# Patient Record
Sex: MALE | Race: WHITE | NOT HISPANIC OR LATINO | Employment: OTHER | URBAN - METROPOLITAN AREA
[De-identification: names, ages, dates, MRNs, and addresses within clinical notes are randomized per-mention and may not be internally consistent; named-entity substitution may affect disease eponyms.]

---

## 2017-05-10 ENCOUNTER — HOSPITAL ENCOUNTER (EMERGENCY)
Facility: HOSPITAL | Age: 72
Discharge: HOME | End: 2017-05-10
Attending: EMERGENCY MEDICINE
Payer: MEDICARE

## 2017-05-10 ENCOUNTER — NURSE TRIAGE (OUTPATIENT)
Dept: ADMINISTRATIVE | Facility: CLINIC | Age: 72
End: 2017-05-10

## 2017-05-10 ENCOUNTER — TELEPHONE (OUTPATIENT)
Dept: CARDIOLOGY | Facility: CLINIC | Age: 72
End: 2017-05-10

## 2017-05-10 VITALS
RESPIRATION RATE: 16 BRPM | HEART RATE: 51 BPM | DIASTOLIC BLOOD PRESSURE: 71 MMHG | WEIGHT: 232 LBS | SYSTOLIC BLOOD PRESSURE: 127 MMHG | BODY MASS INDEX: 37.45 KG/M2 | OXYGEN SATURATION: 88 % | TEMPERATURE: 98 F

## 2017-05-10 DIAGNOSIS — R07.89 ATYPICAL CHEST PAIN: Primary | ICD-10-CM

## 2017-05-10 LAB
ALBUMIN SERPL BCP-MCNC: 3.7 G/DL
ALP SERPL-CCNC: 79 U/L
ALT SERPL W/O P-5'-P-CCNC: 13 U/L
ANION GAP SERPL CALC-SCNC: 9 MMOL/L
AST SERPL-CCNC: 12 U/L
BASOPHILS # BLD AUTO: 0 K/UL
BASOPHILS NFR BLD: 0.5 %
BILIRUB SERPL-MCNC: 0.8 MG/DL
BNP SERPL-MCNC: 846 PG/ML
BUN SERPL-MCNC: 19 MG/DL
CALCIUM SERPL-MCNC: 9.6 MG/DL
CHLORIDE SERPL-SCNC: 108 MMOL/L
CO2 SERPL-SCNC: 23 MMOL/L
CREAT SERPL-MCNC: 0.9 MG/DL
DIFFERENTIAL METHOD: ABNORMAL
EOSINOPHIL # BLD AUTO: 0.2 K/UL
EOSINOPHIL NFR BLD: 2.8 %
ERYTHROCYTE [DISTWIDTH] IN BLOOD BY AUTOMATED COUNT: 17.2 %
EST. GFR  (AFRICAN AMERICAN): >60 ML/MIN/1.73 M^2
EST. GFR  (NON AFRICAN AMERICAN): >60 ML/MIN/1.73 M^2
GLUCOSE SERPL-MCNC: 133 MG/DL
HCT VFR BLD AUTO: 37.7 %
HGB BLD-MCNC: 12.3 G/DL
LYMPHOCYTES # BLD AUTO: 1.3 K/UL
LYMPHOCYTES NFR BLD: 17.4 %
MCH RBC QN AUTO: 30.1 PG
MCHC RBC AUTO-ENTMCNC: 32.6 %
MCV RBC AUTO: 92 FL
MONOCYTES # BLD AUTO: 0.6 K/UL
MONOCYTES NFR BLD: 8 %
NEUTROPHILS # BLD AUTO: 5.4 K/UL
NEUTROPHILS NFR BLD: 71.3 %
PLATELET # BLD AUTO: 221 K/UL
PMV BLD AUTO: 8.7 FL
POTASSIUM SERPL-SCNC: 4.4 MMOL/L
PROT SERPL-MCNC: 7.1 G/DL
RBC # BLD AUTO: 4.09 M/UL
SODIUM SERPL-SCNC: 140 MMOL/L
TROPONIN I SERPL DL<=0.01 NG/ML-MCNC: 0.02 NG/ML
WBC # BLD AUTO: 7.6 K/UL

## 2017-05-10 PROCEDURE — 96374 THER/PROPH/DIAG INJ IV PUSH: CPT

## 2017-05-10 PROCEDURE — 84484 ASSAY OF TROPONIN QUANT: CPT

## 2017-05-10 PROCEDURE — 80053 COMPREHEN METABOLIC PANEL: CPT

## 2017-05-10 PROCEDURE — 85025 COMPLETE CBC W/AUTO DIFF WBC: CPT

## 2017-05-10 PROCEDURE — 83880 ASSAY OF NATRIURETIC PEPTIDE: CPT

## 2017-05-10 PROCEDURE — 36415 COLL VENOUS BLD VENIPUNCTURE: CPT

## 2017-05-10 PROCEDURE — 99284 EMERGENCY DEPT VISIT MOD MDM: CPT | Mod: 25

## 2017-05-10 PROCEDURE — 93005 ELECTROCARDIOGRAM TRACING: CPT

## 2017-05-10 PROCEDURE — 93010 ELECTROCARDIOGRAM REPORT: CPT | Mod: ,,, | Performed by: INTERNAL MEDICINE

## 2017-05-10 PROCEDURE — 25000003 PHARM REV CODE 250: Performed by: EMERGENCY MEDICINE

## 2017-05-10 PROCEDURE — 63600175 PHARM REV CODE 636 W HCPCS: Performed by: EMERGENCY MEDICINE

## 2017-05-10 RX ORDER — ASPIRIN 325 MG
325 TABLET ORAL
Status: COMPLETED | OUTPATIENT
Start: 2017-05-10 | End: 2017-05-10

## 2017-05-10 RX ORDER — FUROSEMIDE 10 MG/ML
40 INJECTION INTRAMUSCULAR; INTRAVENOUS
Status: COMPLETED | OUTPATIENT
Start: 2017-05-10 | End: 2017-05-10

## 2017-05-10 RX ADMIN — FUROSEMIDE 40 MG: 10 INJECTION, SOLUTION INTRAMUSCULAR; INTRAVENOUS at 03:05

## 2017-05-10 RX ADMIN — LIDOCAINE HYDROCHLORIDE: 20 SOLUTION ORAL; TOPICAL at 01:05

## 2017-05-10 RX ADMIN — ASPIRIN 325 MG ORAL TABLET 325 MG: 325 PILL ORAL at 01:05

## 2017-05-10 NOTE — ED AVS SNAPSHOT
[facility] [address] [place] [address]               Moiz Valencia   5/10/2017  1:04 PM   ED    Description:  Male : 1945   Department:  [facility]           Your Care was Coordinated By:     Provider Role From To    Jose L Iglesias MD Attending Provider 05/10/17 3073 --      Reason for Visit     Chest Pain           Diagnoses this Visit        Comments    Atypical chest pain    -  Primary       ED Disposition     None           To Do List           Follow-up Information     Please follow up.    Why:  Follow-up with your cardiologist this week.        Follow up with [facility].    Specialty:  Emergency Medicine    Why:  If symptoms worsen    Contact information:    [address] [place] [id number] [phone]      [facility]     [facility] Care [facility] -  Assistance  Unless otherwise directed by your provider, please contact [facility], our nurse care line that is available for  assistance.     Registered nurses in the [facility] provide: appointment scheduling, clinical advisement, health education, and other advisory services.  Call: [phone] (toll free)               Medications           Message regarding Medications     Verify the changes and/or additions to your medication regime listed below are the same as discussed with your clinician today.  If any of these changes or additions are incorrect, please notify your healthcare provider.        These medications were administered today        Dose Freq    aspirin tablet 325 mg 325 mg ED 1 Time    Sig: Take 1 tablet (325 mg total) by mouth ED 1 Time.    Class: Normal    Route: Oral    (pyxis) gi cocktail (mylanta 30 mL, lidocaine 2 % viscous 10 mL, dicyclomine 10 mL) 50 mL  ED 1 Time    Sig: Take by mouth ED 1 Time.    Class: Normal    Route: Oral    furosemide injection 40 mg 40 mg ED 1 Time     "Sig: Inject 4 mLs (40 mg total) into the vein ED 1 Time.    Class: Normal    Route: Intravenous      STOP taking these medications     BD INSULIN PEN NEEDLE UF SHORT 31 gauge x 5/16" Ndle 1 Stick by Misc.(Non-Drug; Combo Route) route once daily.    blood sugar diagnostic Strp 1 strip by Misc.(Non-Drug; Combo Route) route as directed.           Verify that the below list of medications is an accurate representation of the medications you are currently taking.  If none reported, the list may be blank. If incorrect, please contact your healthcare provider. Carry this list with you in case of emergency.           Current Medications     allopurinol (ZYLOPRIM) 300 MG tablet Take 300 mg by mouth once daily.    amiodarone (PACERONE) 200 MG Tab Take 200 mg by mouth 2 (two) times daily.    aspirin (ECOTRIN) 325 MG EC tablet Take 325 mg by mouth once daily.    atorvastatin (LIPITOR) 80 MG tablet Take 40 mg by mouth once daily.    carvedilol (COREG) 25 MG tablet Take 1 tablet (25 mg total) by mouth 2 (two) times daily with meals.    ENTRESTO 24-26 mg per tablet TK 1 T PO  BID    furosemide (LASIX) 20 MG tablet 20 mg as needed.     insulin glargine (LANTUS SOLOSTAR) 100 unit/mL (3 mL) InPn pen Inject 38 Units into the skin 2 (two) times daily.    JANUVIA 100 mg Tab Take 1 tablet by mouth Daily.    magnesium oxide (MAG-OX) 400 mg tablet TK 1 T PO QD    metformin (GLUCOPHAGE) 1000 MG tablet Take 1,000 mg by mouth 2 (two) times daily with meals.    pantoprazole (PROTONIX) 40 MG tablet TK 1 T PO QAM    paroxetine (PAXIL) 30 MG tablet Take 30 mg by mouth. Pt take 2 tabs nightly.    tramadol (ULTRAM) 50 mg tablet TK 1 T PO  Q 4 TO 6 H PRN    furosemide injection 40 mg Inject 4 mLs (40 mg total) into the vein ED 1 Time.           Clinical Reference Information           Your Vitals Were     BP Pulse Temp Resp Weight SpO2    127/71 51 97.6 °F (36.4 °C) (Oral) 16 105.2 kg (232 lb) 88%    BMI                37.45 kg/m2          Allergies " as of 5/10/2017     No Known Allergies      Immunizations Administered on Date of Encounter - 5/10/2017     None      ED Micro, Lab, POCT     Start Ordered       Status Ordering Provider    05/10/17 1417 05/10/17 1416  Brain natriuretic peptide  Add-on      Final result     05/10/17 1316 05/10/17 1316  CBC auto differential  STAT      Final result     05/10/17 1316 05/10/17 1316  Comprehensive metabolic panel  STAT      Final result     05/10/17 1316 05/10/17 1316  Troponin I #1  Once      Final result       ED Imaging Orders     Start Ordered       Status Ordering Provider    05/10/17 1317 05/10/17 1316  X-Ray Chest PA And Lateral  1 time imaging      Final result       Discharge References/Attachments     CHEST WALL PAIN, COSTOCHONDRITIS (ENGLISH)    PULMONARY EDEMA (ENGLISH)      Your Scheduled Appointments     Jun 21, 2017  9:00 AM CDT   Established Patient Visit with Mio Galeana MD   [facility] ([facility])    [address]  [facility] [address]   [phone]              [facility] Sign-Up     Activating your [facility] account is as easy as 1-2-3!     1) Visit [email or web address], select Sign Up Now, enter this activation code and your date of birth, then select Next.  [email or web address]  Expires: 6/23/2017  2:53 PM      2) Create a username and password to use when you visit [facility] in the future and select a security question in case you lose your password and select Next.    3) Enter your e-mail address and click Sign Up!    Additional Information  If you have questions, please e-mail [email or web address] or call [phone] to talk to our [facility] staff. Remember, [facility] is NOT to be used for urgent needs. For medical emergencies, dial 911.         Smoking Cessation     If you would like to quit smoking:   You may be eligible for free services if you are a [place] resident and started smoking cigarettes before September 1, 1988.  Call the [employer]oking [employer]essation [employer] ([employer])  toll free at (374) 196-9969 or (166) 325-0286.   Call 1-800-QUIT-NOW if you do not meet the above criteria.   Contact us via email: [email or web address]   View our website for more information: [email or web address]         [facility] complies with applicable Federal civil rights laws and does not discriminate on the basis of race, color, national origin, age, disability, or sex.        Language Assistance Services     ATTENTION: Language assistance services are available, free of charge. Please call [phone].      ATENCIÓN: Si habla español, tiene a reed disposición servicios gratuitos de asistencia lingüística. Llame al [phone].     CHÚ Ý: N?u b?n nói Ti?ng Vi?t, có các d?ch v? h? tr? ngôn ng? mi?n phí dành cho b?n. G?i s? [phone].

## 2017-05-10 NOTE — ED PROVIDER NOTES
"Encounter Date: 5/10/2017    SCRIBE #1 NOTE: I, Sabina Nguyen, am scribing for, and in the presence of, Dr. Iglesias.       History     Chief Complaint   Patient presents with    Chest Pain     "burning" left sided     Review of patient's allergies indicates:  No Known Allergies  HPI Comments:   05/10/2017 1:06 PM     Chief Complaint: Chest & abdominal pain      The patient is a 71 y.o. male with DM2, HTN, MI, CAD, and PUD who presents to the ED concerned for his defibrillator with an onset of intermittent episodes of left sided chest and abdominal pain for the last week. The pain is described as burning. He states that the pain "wakes him up at night. The patient also states that he is unsure of similar pain, because he "didn't feel any pain during his heart attack." He reports having an abdominal hernia. The patient has not had his defibrillator check "since the [facility] closed ~10 years ago but denies defibrillator shocks. His Cardiologist is Dr. Digna Mortensen. He denies prior similar symptoms, belching, SOB, nausea, flank pain, or any other symptoms at this time. No pertinent SHx noted.    The history is provided by the patient and the spouse (wife).     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   HENT: Negative for nosebleeds.  "   Eyes: Negative for visual disturbance.   Respiratory: Negative for shortness of breath.    Cardiovascular: Positive for chest pain.   Gastrointestinal: Positive for abdominal pain. Negative for nausea.   Genitourinary: Negative for flank pain.   Musculoskeletal: Negative for back pain and neck pain.   Skin: Negative for rash.   Neurological: Negative for seizures, syncope and headaches.     Physical Exam   Initial Vitals   BP Pulse Resp Temp SpO2   05/10/17 1301 05/10/17 1301 05/10/17 1301 05/10/17 1301 05/10/17 1301   89/62 60 16 97.6 °F (36.4 °C) 96 %     Physical Exam    Nursing note and vitals reviewed.  Constitutional: He appears well-developed and well-nourished. No distress.   HENT:   Head: Normocephalic and atraumatic.   Eyes: Conjunctivae and EOM are normal. Pupils are equal, round, and reactive to light.   Neck: Neck supple.   Cardiovascular: Normal rate and normal heart sounds. An irregular rhythm present. Exam reveals no gallop and no friction rub.    No murmur heard.  S1 and S2 normal.    Pulmonary/Chest: Breath sounds normal. No respiratory distress. He has no wheezes. He has no rhonchi. He has no rales.   Palpable defibrillator in the left anterior chest wall.    Abdominal: Soft. Bowel sounds are normal. He exhibits no distension. There is no tenderness.   Musculoskeletal: Normal range of motion. He exhibits no edema or tenderness.   No peripheral edema.    Neurological: He is alert and oriented to person, place, and time. He has normal strength. No cranial nerve deficit or sensory deficit.   Neurologically intact.    Skin: Skin is warm and dry.   Psychiatric: He has a normal mood and affect.       ED Course   Procedures  Labs Reviewed   CBC W/ AUTO DIFFERENTIAL   COMPREHENSIVE METABOLIC PANEL   TROPONIN I     Imaging Results     None        EKG Readings: (Independently Interpreted)   Other EKG Interpretations: EKG shows a rate of 67, left axis deviation, normal sinus rhythm with premature atrial  contraction.        Medical Decision Making:   History:   Old Medical Records: I decided to obtain old medical records.  Clinical Tests:   Lab Tests: Reviewed and Ordered  Radiological Study: Reviewed and Ordered  Medical Tests: Reviewed and Ordered  Pt has a hx of CAD, DM, HTN, HLP, Cardimyopathy PUD, PNA who presents to emergency room with atypical chest pain.  It is reproducible in nature over the left anterior chest wall.  Patient states this does not feel like his prior cardiac chest pain.  Symptoms have been persistent recently.  Patient would like to go home and follow-up with his cardiologist.  He did have some mild pulmonary edema on his examining him a shot of Lasix.  This edema is unchanged from prior.  Troponin is negative and EKG shows no signs of acute ischemia.             Scribe Attestation:   Scribe #1: I performed the above scribed service and the documentation accurately describes the services I performed. I attest to the accuracy of the note.    Attending Attestation:           Physician Attestation for Scribe:  Physician Attestation Statement for Scribe #1: I, Dr. Iglesias, reviewed documentation, as scribed by Sabina Nguyen in my presence, and it is both accurate and complete.                 ED Course     Clinical Impression:     1. Atypical chest pain                Jose L Iglesias MD  05/10/17 0924

## 2017-05-10 NOTE — TELEPHONE ENCOUNTER
----- Message from Lilia Young sent at 5/10/2017 10:55 AM CDT -----  Contact: Esther Valencia  Wife called regarding scheduling the patient an appt for today for chest pains. Previous patient with [facility]. No answer from the pod, sent the patient's wife to On call Nurse for further assistance. Please contact [phone]

## 2017-05-10 NOTE — TELEPHONE ENCOUNTER
Reason for Disposition   Chest pain lasting longer than 5 minutes    Protocols used: ST CHEST PAIN-A-OH  Spoke to patient's wife who states he is experiencing chest pain around area of defibrillator. Caller pain is constant and patient told her he never felt pain this before.

## 2017-05-11 DIAGNOSIS — Z95.810 ICD (IMPLANTABLE CARDIOVERTER-DEFIBRILLATOR) IN PLACE: Primary | ICD-10-CM

## 2017-05-11 DIAGNOSIS — I49.9 CARDIAC ARRHYTHMIA, UNSPECIFIED CARDIAC ARRHYTHMIA TYPE: ICD-10-CM

## 2017-05-11 PROBLEM — I50.1 LEFT HEART FAILURE: Status: ACTIVE | Noted: 2017-05-11

## 2017-05-11 PROBLEM — Z79.899 LONG TERM CURRENT USE OF AMIODARONE: Status: ACTIVE | Noted: 2017-05-11

## 2017-05-12 ENCOUNTER — TELEPHONE (OUTPATIENT)
Dept: CARDIOLOGY | Facility: CLINIC | Age: 72
End: 2017-05-12

## 2017-05-12 ENCOUNTER — OFFICE VISIT (OUTPATIENT)
Dept: CARDIOLOGY | Facility: CLINIC | Age: 72
End: 2017-05-12
Payer: MEDICARE

## 2017-05-12 ENCOUNTER — CLINICAL SUPPORT (OUTPATIENT)
Dept: CARDIOLOGY | Facility: CLINIC | Age: 72
End: 2017-05-12
Payer: MEDICARE

## 2017-05-12 VITALS
HEART RATE: 49 BPM | WEIGHT: 233 LBS | DIASTOLIC BLOOD PRESSURE: 62 MMHG | HEIGHT: 65 IN | SYSTOLIC BLOOD PRESSURE: 120 MMHG | BODY MASS INDEX: 38.82 KG/M2

## 2017-05-12 DIAGNOSIS — I50.1 LEFT HEART FAILURE: ICD-10-CM

## 2017-05-12 DIAGNOSIS — E78.00 HYPERCHOLESTEROLEMIA: ICD-10-CM

## 2017-05-12 DIAGNOSIS — Z79.899 LONG TERM CURRENT USE OF AMIODARONE: ICD-10-CM

## 2017-05-12 DIAGNOSIS — Z01.810 ENCOUNTER FOR PRE-OPERATIVE CARDIOVASCULAR CLEARANCE: Primary | ICD-10-CM

## 2017-05-12 DIAGNOSIS — E66.01 SEVERE OBESITY (BMI 35.0-39.9) WITH COMORBIDITY: ICD-10-CM

## 2017-05-12 DIAGNOSIS — I49.9 CARDIAC ARRHYTHMIA, UNSPECIFIED CARDIAC ARRHYTHMIA TYPE: ICD-10-CM

## 2017-05-12 DIAGNOSIS — Z95.810 ICD (IMPLANTABLE CARDIOVERTER-DEFIBRILLATOR) IN PLACE: ICD-10-CM

## 2017-05-12 DIAGNOSIS — I25.10 CORONARY ARTERY DISEASE INVOLVING NATIVE CORONARY ARTERY OF NATIVE HEART WITHOUT ANGINA PECTORIS: ICD-10-CM

## 2017-05-12 PROCEDURE — 93282 PRGRMG EVAL IMPLANTABLE DFB: CPT | Mod: PBBFAC,PO | Performed by: INTERNAL MEDICINE

## 2017-05-12 PROCEDURE — 99999 PR PBB SHADOW E&M-EST. PATIENT-LVL III: CPT | Mod: PBBFAC,,, | Performed by: INTERNAL MEDICINE

## 2017-05-12 PROCEDURE — 99214 OFFICE O/P EST MOD 30 MIN: CPT | Mod: S$PBB,,, | Performed by: INTERNAL MEDICINE

## 2017-05-12 RX ORDER — GABAPENTIN 100 MG/1
200 CAPSULE ORAL 3 TIMES DAILY
COMMUNITY
End: 2019-06-10

## 2017-05-12 RX ORDER — CARVEDILOL 25 MG/1
25 TABLET ORAL 2 TIMES DAILY WITH MEALS
Qty: 180 TABLET | Refills: 3 | Status: SHIPPED | OUTPATIENT
Start: 2017-05-12 | End: 2019-03-11 | Stop reason: SDUPTHER

## 2017-05-12 RX ORDER — ETODOLAC 400 MG/1
400 TABLET, FILM COATED ORAL 2 TIMES DAILY
COMMUNITY
End: 2019-01-24

## 2017-05-12 NOTE — PATIENT INSTRUCTIONS
Understanding Cardiac Resynchronization Therapy (CRT)    Cardiac resynchronization therapy (CRT) is a treatment that may help you when your heart isnt pumping as well as it should. This problem can be caused by heart failure, a condition in which the heart muscle has become weak. It can also be caused by an electrical problem that keeps the bottom chambers of the heart (ventricles) from beating in sync. This can make heart failure worse.  When you have heart failure, fluid can build up in your lungs and your legs (edema). You may have less energy and be short of breath. These symptoms interfere with daily life.  CRT uses a small device to help improve the timing of the hearts contractions. CRT can ease symptoms, improve your quality of life, and help you live longer.  How CRT Works  With CRT, a small electrical device (pacemaker or defibrillator) is put under the skin in the upper chest. This is a minor surgical procedure done at a hospital. It is not heart surgery. Wires from the device lead to the ventricles. The device sends electrical pulses to each ventricle at the same time. This keeps the ventricles beating in sync . This procedure is also called biventricular pacing or resynchronization pacing.  CRT can be done with 1 of 2 devices. The type of device used depends on the persons needs. The devices are:  · A biventricular pacemaker. This device helps the heart beat at a normal rate.  · A biventricular ICD (implantable cardioverter defibrillator). This device is a pacemaker but also can treat fast, life-threatening heart rhythms.  Reasons for CRT  Your healthcare provider may advise CRT if:  · You have heart failure symptoms and your heart doesnt pump well  · The ventricles are not working together  · Tests, like an echocardiogram, show that your heart is weak and enlarged  · Medicine and lifestyle changes are not working well enough to control your heart failure  Benefits of CRT  CRT wont replace your  other treatments. Its part of a complete heart failure treatment plan. CRT helps a weakened heart do a better job of pumping blood out of the heart with each beat. So, more blood and oxygen go to the rest of your body. This can decrease heart failure symptoms and improve survival. The device is put into your body during a low-risk procedure.  Not everyone with heart failure benefits from CRT. CRT improves symptoms in about 2 out of 3 people who get it. For those who have mild symptoms, it can also prevent worsening heart failure. With CRT, you may be more able to:  · Return to daily activities such as walking, carrying grocery bags, and climbing stairs  · Have more energy to be active and do the things you enjoy  · Breathe more easily when you lie flat, so you sleep better at night  · Have less swelling in your ankles, feet, and abdomen  · Make fewer visits to the hospital because of heart failure symptoms  · Have fewer side effects from your heart failure medicines  Risks and complications  Like all medical procedures, having a CRT device implanted has some risks. These include:  · Anesthesia reactions  · Swelling or bruising in the upper chest area where the CRT device is placed  · Bleeding  · Infection  · Heart rhythm problems  · Collapsed lung  · Nerve or blood vessel damage  · Movement of the device or the device wires, which may require a second procedure  · Mechanical problems with the CRT device  · Kidney damage  · Sudden worsening of heart failure  · Other risks related to your specific medical condition  Life with CRT  If you have a CRT device, you may need to stay away from certain electronic devices and devices that have strong magnetic fields. These devices can interfere with how the CRT device works. You will need to avoid anti-theft systems, security metal detectors, CB radios, and very strong magnets, such as those used in MRI. However, depending on the type of device you have implanted, you may be  able to undergo an MRI with certain precautions in place. Talk to your cardiologist and the radiologist to make sure it is safe.  Your provider may also give you specific instructions for using cell phones and headphones with mp3 players. Your provider may give you a list of other devices and procedures to avoid. Most people with CRT devices can still enjoy physical activity, including sports and exercise.  You should not drive until your doctor says it's OK. The driving restriction is done to be sure that your health condition does not cause a safety issue for yourself or others on the road. Ask your provider when it may be safe for you to continue to drive.  You will have regular appointments to see how the device is working and to check the battery life of your device. Some of the device monitoring can be done using a home device that transmits the information about your device to your provider's office over a telephone or internet connection. The battery, or generator, will have to be changed at some point and your provider will let you know as that time approaches.  Date Last Reviewed: 6/1/2016 © 2000-2016 [employer]. [address] [place] [address]. All rights reserved. This information is not intended as a substitute for professional medical care. Always follow your healthcare professional's instructions.        Left-Sided Congestive Heart Failure    The heart is a large muscle that pumps blood throughout the body. Blood carries oxygen to all the organs (including the brain), muscles, and skin of your body. After the body takes the oxygen out of the blood, the blood returns to the heart. The right side of the heart collects that blood and pumps it to the lungs to receive fresh oxygen. This oxygen-rich blood from the lungs then returns to the left side of the heart, where it is pumped back out to the brain and rest of the body, starting the process all over.   Heart failure occurs  when the heart muscle is weakened. This can occur after a heart attack or with significant coronary artery disease. This affects the pumping action of the heart.   When the left side of the heart is weakened, it cant handle the blood it is getting from the lungs. Pressure then builds up in the veins of the lungs, causing fluid to leak into the lung tissues. This may be referred to as congestive heart failure. This causes you to feel short of breath, weak, or dizzy. These symptoms are often worse with exertion, such as climbing stairs or walking up hills. Lying flat is uncomfortable and can make your breathing worse. This may make sleeping difficult and force you to use extra pillows to elevate your upper body to help you sleep well.  Causes of heart failure include:  · Coronary artery disease  · Heart attack in the past (also known as acute myocardial infarction, or AMI)  · High blood pressure  · Damaged heart valve  · Diabetes  · Obesity  · Cigarette smoking  · Alcohol abuse  Treatment  Heart failure is a chronic condition. There is no cure. The purpose of medical treatment is to improve the pumping action of the heart, and remove excess water and fluids from the body. A number of medicines can help reach this goal, improve symptoms and keep the heart from becoming weaker. In some cases of severe heart failure, a mechanical device will be placed in the heart to help the heart pump. Another major goal is to better treat the causes of heart failure, such as diabetes, high blood pressure, and your lifestyle.  Home care  · Check your weight every day. A sudden increase in weight gain could mean worsening heart failure.  ¨ Use the same scale every day.  ¨ Weigh yourself at the same time every day.  ¨ Make sure the scale is on the floor, not on a rug.  ¨ Keep a record of your weight every day, so your healthcare provider can see it. If you are not given a log sheet for this, keep a separate journal for this  purpose.   · Cut back on how much salt (sodium) you eat:  ¨ Your provider will tell you how much salt to have daily, usually 2,000 mg or less.  ¨ Avoid high-salt foods. These include olives, pickles, smoked meats, processed foods, and salted potato chips.  ¨ Don't add salt to your food at the table. Use only small amounts of salt when cooking.  ¨ Avoid binging on salt-heavy meals.  · Follow your healthcare provider's recommendations about how much fluid you should have.  · Stop smoking.  · Cut back on the amount of alcohol you drink.  · Lose weight if you are overweight. The excess weight adds a lot of stress on the workload of the heart.  · Stay active. Talk to your provider about an exercise program that is safe for your heart.  · Keep your feet elevated to reduce swelling. Ask your provider about support hose as a preventive treatment for daytime leg swelling.  · Follow your healthcare provider's instructions closely.  Besides taking your medicine as instructed, an important part of treatment includes lifestyle changes. These include diet, physical activity, stopping smoking, and weight control.  Improve your diet. Often in the hospital, people are given a heart healthy diet. This includes more fresh foods, lower saturated fat, less processed foods, and lower salt.  Follow-up care  Follow up with your healthcare provider, or as advised. Make sure to keep any appointments that were made for you. This can help better control heart failure.  If an X-ray was done, you will be told of any new findings that may affect your care.  Call 911  Call 911 if you:  · Become severely short of breath  · Feel lightheaded, or feel like you might pass out or faint  · Have chest pain or discomfort that is different than usual, the medicines your provider told you to use for this don't help, or the pain lasts longer than 10 to 15 minutes  · Develop a rapid heart rate suddenly  When to seek medical advice  Call your healthcare  provider right away if you have any of these signs of worsening heart failure:  · Sudden weight gain --  more than 2 pounds in 1 day, or 5 pounds in 1 week, or whatever weight gain you were told to report by your provider  · Trouble breathing not related to being active  · New or increased swelling of your legs or ankles  · Swelling or pain in your abdomen  · Breathing trouble at night, waking up short of breath or needing more pillows to elevate your upper body to help you breathe  · Frequent coughing that doesnt go away  · Feeling much more tired than usual  Date Last Reviewed: 1/4/2016 © 2000-2016 [employer]. [address] [place] [address], [place] [address]. All rights reserved. This information is not intended as a substitute for professional medical care. Always follow your healthcare professional's instructions.        Understanding Coronary Artery Disease (CAD)    To understand coronary artery disease (CAD), you need to know how your heart works. Your heart is a muscle that pumps blood throughout your body. To work right, your heart needs a steady supply of oxygen. It gets this oxygen from blood supplied by the coronary arteries.        Healthy Artery      Damaged Artery      Narrowed Artery      Blocked Artery   Healthy artery. When a coronary artery is healthy and has no blockages, blood flows through easily. Healthy arteries can easily supply the oxygen-rich blood your heart needs.  Damaged artery. Coronary artery disease begins when damage to the artery lining leads to the buildup of fat-like substances and cholesterol along the artery wall. This is called plaque. This damage could be caused by things like high blood pressure or smoking. This plaque buildup begins to narrow the arteries carrying blood to the heart. This is called atherosclerosis,  Narrowed artery. As more plaque builds up, your artery has trouble supplying blood to your heart muscle when it needs it most, such as during  "exercise. You may not feel any symptoms when this happens. Or you may feel angina--pressure, tightness, achiness, or pain in your chest, jaw, neck, back, or arm.  Blocked artery. A piece of plaque may break off and completely block the artery. Or a blood clot may plug the narrowed artery. When this happens, blood flow is blocked from reaching the heart. Without oxygen-rich blood, part of the heart muscle becomes damaged and stops working. You may feel crushing pressure or pain in or around your chest. This is a heart attack (acute myocardial infarction, or AMI) and is a medical emergency.  Date Last Reviewed: 3/28/2016  © 8699-2170 [employer]. [address] [place] [address], [place] [address]. All rights reserved. This information is not intended as a substitute for professional medical care. Always follow your healthcare professional's instructions.        Risk Factors for Heart Disease  A risk factor is something that increases your chance of having heart disease. Heart disease (also called coronary artery disease) involves damage to the heart arteries. These blood vessels need to work well to provide the oxygen your heart needs to pump blood to the rest of your body. Things like smoking or high cholesterol levels can damage arteries. You cant control some risk factors, such as age and a family history of heart disease. But there are many things you can control to reduce your risk for heart disease.    Unhealthy cholesterol levels  Cholesterol is a fat-like substance in your blood. It can build up along the artery walls. This is called plaque. Over time, plaque narrows the arteries and reduces blood flow to your heart or brain. If a blood clot forms or a piece of the plaque breaks off, it can completely block the artery and cause a heart attack or stroke. Your risk of heart disease goes up if you have high levels of LDL ("bad") cholesterol or triglycerides (another fatty substance that can build up). " "Youre also at risk if you have low HDL cholesterol ("good") cholesterol. HDL helps clear the bad cholesterol away. You're at risk if you have:  HDL cholesterol 50 mg/dL or lower; LDL cholesterol 100 mg/dL; or triglycerides of 150 mg/dL or higher.  Smoking  This is the most important risk factor you can change. Smoking causes inflammation and damages the smooth muscle that lines the arteries making them less flexible. It also raises your blood pressure, causing further damage to the artery lining. Smoking also increases your risk that your blood may clot, block an artery, and cause a heart attack or stroke. Smoking also damages your lungs, which affects the delivery of oxygen to the body. If you smoke, you are 2 to 4 times more likely to develop coronary artery disease. If you smoke, it's never too late to help your heart. Ask your doctor about nicotine replacement products and smoking cessation support.  High blood pressure  High blood pressure occurs when blood pushes too hard against artery walls. This causes damage to the artery walls and the formation of scar tissue as it heals. This makes the arteries stiff and weak. Plaque sticks to the scarred tissue narrowing and hardening the arteries. High blood pressure also causes your heart to work harder to get blood out to the body. High blood pressure raises your risk of heart attack, also known as acute myocardial infarction, or AMI, and especially stroke. The brain tissue is especially sensitive to high blood pressure damage. You're at risk if your blood pressure is 120/80 or higher.  Negative emotions  Chronic stress, pent-up anger, and other negative emotions have been linked to heart disease. This occurs because stress increases the levels of a hormone that increase the demand on your heart. Over time, these emotions could raise your heart disease risk.  Metabolic syndrome  This is caused by a combination of certain risk factors. It puts you at extra high risk " of heart disease, stroke, and diabetes. You have metabolic syndrome if you have 3 or more of the following: low HDL cholesterol; high triglycerides; high blood pressure; high blood sugar; extra weight around the waist.  Diabetes  Diabetes occurs when you have high levels of sugar (glucose) in your blood. This can damage arteries if not kept under control. Having diabetes also makes you more likely to have a silent heart attack--one without any symptoms.  Excess weight  Excess weight makes other risk factors, such as diabetes, more likely. Excess weight around the waist or stomach increases your heart disease risk the most.  Lack of physical activity  When youre not active, youre more likely to develop diabetes, high blood pressure, high cholesterol levels, and excess weight.     Most people with heart disease have more than one risk factor.   Date Last Reviewed: 3/28/2016  © 2500-2683 [employer]. [address] [place] [address]. All rights reserved. This information is not intended as a substitute for professional medical care. Always follow your healthcare professional's instructions.        Weight Management: Exercise and Activity  Studies show that people who exercise are the most likely to lose weight and keep it off. Exercise burns calories. It helps build muscle to make your body stronger. Make exercise an important part of your weight-management plan.    Make activity part of your day  You may not think you have the time to exercise. But you can work activity into your daily life--you just need to be committed. Take 10 minutes out of your lunch hour to take a walk. Walk to the newsstand to get your paper instead of having it delivered. Make it a habit to take the stairs instead of the elevator. Park in a far away parking spot instead of the closest. Youll be surprised at how fast these little changes can make a difference.  Some people really cannot walk very far, and tire out  quickly with exercise. Instead of becoming discouraged, resolve to do what you can do, and work to make that a regular frequent habit.   The benefits of exercise  Exercise offers many benefits including:   · Exercise increases your metabolism (the speed at which your body burns calories).  · Regular exercise can increase the amount of muscle in your body. Muscle burns calories faster than fat. The more muscle you have, the more calories you burn.  · Exercise gives you energy and curbs your appetite.  · Exercise decreases stress and helps you sleep better.  Make exercise fun  Exercise can be fun. Choose an activity you enjoy. You may even get a friend to do it with you:  · Take a resistance-training or aerobics class  · Join a team sport  · Take a dance class  · Walk the dog  · Ride a bike  If you have health problems, be sure to ask your healthcare provider before you start an exercise program. Have a  help you develop a plan thats safe for you.   Date Last Reviewed: 2/4/2016  © 8871-7624 [employer] [facility] [facility] [facility] ([employer]). [address] [place] [address], [place] [address]. All rights reserved. This information is not intended as a substitute for professional medical care. Always follow your healthcare professional's instructions.

## 2017-05-12 NOTE — TELEPHONE ENCOUNTER
----- Message from Carmelita Jace sent at 5/10/2017  4:47 PM CDT -----  Contact: Wife - Esther Valencia  States that the patient is now at [facility] in the ER and is a previous patient of Dr Sarkar's.  The patient is going home but the doctor at the hospital is requesting for the patient to see Dr Sarkar tomorrow.  The next available appointment is not until 05/30/2017 for a consult.  Can you pllease call [phone].  Thank you

## 2017-05-12 NOTE — MR AVS SNAPSHOT
[facility]  [address] [facility] [address]  [facility] [address]  Phone: [phone]                  Moiz Valencia   2017 9:20 AM   Office Visit    Description:  Male : 1945   Provider:  Digna Sarkar MD   Department:  [facility]           Reason for Visit     Congestive Heart Failure           Diagnoses this Visit        Comments    Encounter for pre-operative cardiovascular clearance    -  Primary MOD RISK    Left heart failure     CLASS 2, SEVERE LV DYSFUNCTION    Long term current use of amiodarone     LABS    ICD (implantable cardioverter-defibrillator) in place     CHECK    Coronary artery disease involving native coronary artery of native heart without angina pectoris     STABLE    Hypercholesterolemia         Severe obesity (BMI 35.0-39.9) with comorbidity                To Do List           Goals (5 Years of Data)     None      Follow-Up and Disposition     Return in about 3 months (around 2017).       These Medications        Disp Refills Start End    carvedilol (COREG) 25 MG tablet 180 tablet 3 2017     Take 1 tablet (25 mg total) by mouth 2 (two) times daily with meals. - Oral    Pharmacy: [facility] [id number] [place] [address] AT [facility] Ph #: [phone]         [facility]     [facility] Care [facility] -  Assistance  Unless otherwise directed by your provider, please contact [facility], our nurse care line that is available for  assistance.     Registered nurses in the [facility] provide: appointment scheduling, clinical advisement, health education, and other advisory services.  Call: [phone] (toll free)               Medications           Message regarding Medications     Verify the changes and/or additions to your medication regime listed below are the same as discussed with your clinician today.  If any of these changes or additions are incorrect, please  "notify your healthcare provider.        STOP taking these medications     pantoprazole (PROTONIX) 40 MG tablet Take one tablet every morning.           Verify that the below list of medications is an accurate representation of the medications you are currently taking.  If none reported, the list may be blank. If incorrect, please contact your healthcare provider. Carry this list with you in case of emergency.           Current Medications     allopurinol (ZYLOPRIM) 300 MG tablet Take 300 mg by mouth once daily.    amiodarone (PACERONE) 200 MG Tab Take 200 mg by mouth 2 (two) times daily.    aspirin (ECOTRIN) 325 MG EC tablet Take 325 mg by mouth once daily.    atorvastatin (LIPITOR) 80 MG tablet Take 40 mg by mouth once daily.    carvedilol (COREG) 25 MG tablet Take 1 tablet (25 mg total) by mouth 2 (two) times daily with meals.    ENTRESTO 24-26 mg per tablet Take one tablet by mouth twice daily.    etodolac (LODINE) 400 MG tablet Take 400 mg by mouth 2 (two) times daily.    furosemide (LASIX) 20 MG tablet 20 mg as needed (fluid).     gabapentin (NEURONTIN) 100 MG capsule Take 200 mg by mouth 3 (three) times daily. Takes 200mg    insulin detemir (LEVEMIR) 100 unit/mL injection Inject 38 Units into the skin 2 (two) times daily.    insulin glargine (LANTUS SOLOSTAR) 100 unit/mL (3 mL) InPn pen Inject 38 Units into the skin 2 (two) times daily.    JANUVIA 100 mg Tab Take 1 tablet by mouth Daily.    magnesium oxide (MAG-OX) 400 mg tablet Take one tablet daily.    metformin (GLUCOPHAGE) 1000 MG tablet Take 1,000 mg by mouth 2 (two) times daily with meals.    paroxetine (PAXIL) 30 MG tablet Take 30 mg by mouth 2 (two) times daily.     tramadol (ULTRAM) 50 mg tablet Take one tablet by mouth every 4-6 hours as needed for pain.           Clinical Reference Information           Your Vitals Were     BP Pulse Height Weight BMI    120/62 49 5' 5" (1.651 m) 105.7 kg (233 lb 0.4 oz) 38.78 kg/m2      Blood Pressure          Most " Recent Value    BP  120/62      Allergies as of 5/12/2017     No Known Allergies      Immunizations Administered on Date of Encounter - 5/12/2017     None      Orders Placed During Today's Visit     Future Labs/Procedures Expected by Expires    Comprehensive metabolic panel  5/12/2017 7/11/2018    Lipid panel  5/12/2017 7/11/2018    T3  5/12/2017 7/11/2018    T4  5/12/2017 7/11/2018    TSH  5/12/2017 7/11/2018      [facility] Sign-Up     Activating your [facility] account is as easy as 1-2-3!     1) Visit [email or web address], select Sign Up Now, enter this activation code and your date of birth, then select Next.  [email or web address]  Expires: 6/23/2017  2:53 PM      2) Create a username and password to use when you visit [facility] in the future and select a security question in case you lose your password and select Next.    3) Enter your e-mail address and click Sign Up!    Additional Information  If you have questions, please e-mail [email or web address] or call [phone] to talk to our [facility] staff. Remember, [facility] is NOT to be used for urgent needs. For medical emergencies, dial 911.         Instructions      Understanding Cardiac Resynchronization Therapy (CRT)    Cardiac resynchronization therapy (CRT) is a treatment that may help you when your heart isnt pumping as well as it should. This problem can be caused by heart failure, a condition in which the heart muscle has become weak. It can also be caused by an electrical problem that keeps the bottom chambers of the heart (ventricles) from beating in sync. This can make heart failure worse.  When you have heart failure, fluid can build up in your lungs and your legs (edema). You may have less energy and be short of breath. These symptoms interfere with daily life.  CRT uses a small device to help improve the timing of the hearts contractions. CRT can ease symptoms, improve your quality of life, and help you live longer.  How CRT Works  With CRT, a small  electrical device (pacemaker or defibrillator) is put under the skin in the upper chest. This is a minor surgical procedure done at a hospital. It is not heart surgery. Wires from the device lead to the ventricles. The device sends electrical pulses to each ventricle at the same time. This keeps the ventricles beating in sync . This procedure is also called biventricular pacing or resynchronization pacing.  CRT can be done with 1 of 2 devices. The type of device used depends on the persons needs. The devices are:  · A biventricular pacemaker. This device helps the heart beat at a normal rate.  · A biventricular ICD (implantable cardioverter defibrillator). This device is a pacemaker but also can treat fast, life-threatening heart rhythms.  Reasons for CRT  Your healthcare provider may advise CRT if:  · You have heart failure symptoms and your heart doesnt pump well  · The ventricles are not working together  · Tests, like an echocardiogram, show that your heart is weak and enlarged  · Medicine and lifestyle changes are not working well enough to control your heart failure  Benefits of CRT  CRT wont replace your other treatments. Its part of a complete heart failure treatment plan. CRT helps a weakened heart do a better job of pumping blood out of the heart with each beat. So, more blood and oxygen go to the rest of your body. This can decrease heart failure symptoms and improve survival. The device is put into your body during a low-risk procedure.  Not everyone with heart failure benefits from CRT. CRT improves symptoms in about 2 out of 3 people who get it. For those who have mild symptoms, it can also prevent worsening heart failure. With CRT, you may be more able to:  · Return to daily activities such as walking, carrying grocery bags, and climbing stairs  · Have more energy to be active and do the things you enjoy  · Breathe more easily when you lie flat, so you sleep better at night  · Have less swelling  in your ankles, feet, and abdomen  · Make fewer visits to the hospital because of heart failure symptoms  · Have fewer side effects from your heart failure medicines  Risks and complications  Like all medical procedures, having a CRT device implanted has some risks. These include:  · Anesthesia reactions  · Swelling or bruising in the upper chest area where the CRT device is placed  · Bleeding  · Infection  · Heart rhythm problems  · Collapsed lung  · Nerve or blood vessel damage  · Movement of the device or the device wires, which may require a second procedure  · Mechanical problems with the CRT device  · Kidney damage  · Sudden worsening of heart failure  · Other risks related to your specific medical condition  Life with CRT  If you have a CRT device, you may need to stay away from certain electronic devices and devices that have strong magnetic fields. These devices can interfere with how the CRT device works. You will need to avoid anti-theft systems, security metal detectors, CB radios, and very strong magnets, such as those used in MRI. However, depending on the type of device you have implanted, you may be able to undergo an MRI with certain precautions in place. Talk to your cardiologist and the radiologist to make sure it is safe.  Your provider may also give you specific instructions for using cell phones and headphones with mp3 players. Your provider may give you a list of other devices and procedures to avoid. Most people with CRT devices can still enjoy physical activity, including sports and exercise.  You should not drive until your doctor says it's OK. The driving restriction is done to be sure that your health condition does not cause a safety issue for yourself or others on the road. Ask your provider when it may be safe for you to continue to drive.  You will have regular appointments to see how the device is working and to check the battery life of your device. Some of the device monitoring can  be done using a home device that transmits the information about your device to your provider's office over a telephone or internet connection. The battery, or generator, will have to be changed at some point and your provider will let you know as that time approaches.  Date Last Reviewed: 6/1/2016  © 6238-9616 [employer]. [address] [place] [address]. All rights reserved. This information is not intended as a substitute for professional medical care. Always follow your healthcare professional's instructions.        Left-Sided Congestive Heart Failure    The heart is a large muscle that pumps blood throughout the body. Blood carries oxygen to all the organs (including the brain), muscles, and skin of your body. After the body takes the oxygen out of the blood, the blood returns to the heart. The right side of the heart collects that blood and pumps it to the lungs to receive fresh oxygen. This oxygen-rich blood from the lungs then returns to the left side of the heart, where it is pumped back out to the brain and rest of the body, starting the process all over.   Heart failure occurs when the heart muscle is weakened. This can occur after a heart attack or with significant coronary artery disease. This affects the pumping action of the heart.   When the left side of the heart is weakened, it cant handle the blood it is getting from the lungs. Pressure then builds up in the veins of the lungs, causing fluid to leak into the lung tissues. This may be referred to as congestive heart failure. This causes you to feel short of breath, weak, or dizzy. These symptoms are often worse with exertion, such as climbing stairs or walking up hills. Lying flat is uncomfortable and can make your breathing worse. This may make sleeping difficult and force you to use extra pillows to elevate your upper body to help you sleep well.  Causes of heart failure include:  · Coronary artery disease  · Heart attack  in the past (also known as acute myocardial infarction, or AMI)  · High blood pressure  · Damaged heart valve  · Diabetes  · Obesity  · Cigarette smoking  · Alcohol abuse  Treatment  Heart failure is a chronic condition. There is no cure. The purpose of medical treatment is to improve the pumping action of the heart, and remove excess water and fluids from the body. A number of medicines can help reach this goal, improve symptoms and keep the heart from becoming weaker. In some cases of severe heart failure, a mechanical device will be placed in the heart to help the heart pump. Another major goal is to better treat the causes of heart failure, such as diabetes, high blood pressure, and your lifestyle.  Home care  · Check your weight every day. A sudden increase in weight gain could mean worsening heart failure.  ¨ Use the same scale every day.  ¨ Weigh yourself at the same time every day.  ¨ Make sure the scale is on the floor, not on a rug.  ¨ Keep a record of your weight every day, so your healthcare provider can see it. If you are not given a log sheet for this, keep a separate journal for this purpose.   · Cut back on how much salt (sodium) you eat:  ¨ Your provider will tell you how much salt to have daily, usually 2,000 mg or less.  ¨ Avoid high-salt foods. These include olives, pickles, smoked meats, processed foods, and salted potato chips.  ¨ Don't add salt to your food at the table. Use only small amounts of salt when cooking.  ¨ Avoid binging on salt-heavy meals.  · Follow your healthcare provider's recommendations about how much fluid you should have.  · Stop smoking.  · Cut back on the amount of alcohol you drink.  · Lose weight if you are overweight. The excess weight adds a lot of stress on the workload of the heart.  · Stay active. Talk to your provider about an exercise program that is safe for your heart.  · Keep your feet elevated to reduce swelling. Ask your provider about support hose as a  preventive treatment for daytime leg swelling.  · Follow your healthcare provider's instructions closely.  Besides taking your medicine as instructed, an important part of treatment includes lifestyle changes. These include diet, physical activity, stopping smoking, and weight control.  Improve your diet. Often in the hospital, people are given a heart healthy diet. This includes more fresh foods, lower saturated fat, less processed foods, and lower salt.  Follow-up care  Follow up with your healthcare provider, or as advised. Make sure to keep any appointments that were made for you. This can help better control heart failure.  If an X-ray was done, you will be told of any new findings that may affect your care.  Call 911  Call 911 if you:  · Become severely short of breath  · Feel lightheaded, or feel like you might pass out or faint  · Have chest pain or discomfort that is different than usual, the medicines your provider told you to use for this don't help, or the pain lasts longer than 10 to 15 minutes  · Develop a rapid heart rate suddenly  When to seek medical advice  Call your healthcare provider right away if you have any of these signs of worsening heart failure:  · Sudden weight gain --  more than 2 pounds in 1 day, or 5 pounds in 1 week, or whatever weight gain you were told to report by your provider  · Trouble breathing not related to being active  · New or increased swelling of your legs or ankles  · Swelling or pain in your abdomen  · Breathing trouble at night, waking up short of breath or needing more pillows to elevate your upper body to help you breathe  · Frequent coughing that doesnt go away  · Feeling much more tired than usual  Date Last Reviewed: 1/4/2016 © 2000-2016 [employer]. [address] [place] [address], [place] [address]. All rights reserved. This information is not intended as a substitute for professional medical care. Always follow your healthcare professional's  instructions.        Understanding Coronary Artery Disease (CAD)    To understand coronary artery disease (CAD), you need to know how your heart works. Your heart is a muscle that pumps blood throughout your body. To work right, your heart needs a steady supply of oxygen. It gets this oxygen from blood supplied by the coronary arteries.        Healthy Artery      Damaged Artery      Narrowed Artery      Blocked Artery   Healthy artery. When a coronary artery is healthy and has no blockages, blood flows through easily. Healthy arteries can easily supply the oxygen-rich blood your heart needs.  Damaged artery. Coronary artery disease begins when damage to the artery lining leads to the buildup of fat-like substances and cholesterol along the artery wall. This is called plaque. This damage could be caused by things like high blood pressure or smoking. This plaque buildup begins to narrow the arteries carrying blood to the heart. This is called atherosclerosis,  Narrowed artery. As more plaque builds up, your artery has trouble supplying blood to your heart muscle when it needs it most, such as during exercise. You may not feel any symptoms when this happens. Or you may feel angina--pressure, tightness, achiness, or pain in your chest, jaw, neck, back, or arm.  Blocked artery. A piece of plaque may break off and completely block the artery. Or a blood clot may plug the narrowed artery. When this happens, blood flow is blocked from reaching the heart. Without oxygen-rich blood, part of the heart muscle becomes damaged and stops working. You may feel crushing pressure or pain in or around your chest. This is a heart attack (acute myocardial infarction, or AMI) and is a medical emergency.  Date Last Reviewed: 3/28/2016  © 1745-4832 [employer]. [address] [place] [address], [place] [address]. All rights reserved. This information is not intended as a substitute for professional medical care. Always follow your  "healthcare professional's instructions.        Risk Factors for Heart Disease  A risk factor is something that increases your chance of having heart disease. Heart disease (also called coronary artery disease) involves damage to the heart arteries. These blood vessels need to work well to provide the oxygen your heart needs to pump blood to the rest of your body. Things like smoking or high cholesterol levels can damage arteries. You cant control some risk factors, such as age and a family history of heart disease. But there are many things you can control to reduce your risk for heart disease.    Unhealthy cholesterol levels  Cholesterol is a fat-like substance in your blood. It can build up along the artery walls. This is called plaque. Over time, plaque narrows the arteries and reduces blood flow to your heart or brain. If a blood clot forms or a piece of the plaque breaks off, it can completely block the artery and cause a heart attack or stroke. Your risk of heart disease goes up if you have high levels of LDL ("bad") cholesterol or triglycerides (another fatty substance that can build up). Youre also at risk if you have low HDL cholesterol ("good") cholesterol. HDL helps clear the bad cholesterol away. You're at risk if you have:  HDL cholesterol 50 mg/dL or lower; LDL cholesterol 100 mg/dL; or triglycerides of 150 mg/dL or higher.  Smoking  This is the most important risk factor you can change. Smoking causes inflammation and damages the smooth muscle that lines the arteries making them less flexible. It also raises your blood pressure, causing further damage to the artery lining. Smoking also increases your risk that your blood may clot, block an artery, and cause a heart attack or stroke. Smoking also damages your lungs, which affects the delivery of oxygen to the body. If you smoke, you are 2 to 4 times more likely to develop coronary artery disease. If you smoke, it's never too late to help your heart. " Ask your doctor about nicotine replacement products and smoking cessation support.  High blood pressure  High blood pressure occurs when blood pushes too hard against artery walls. This causes damage to the artery walls and the formation of scar tissue as it heals. This makes the arteries stiff and weak. Plaque sticks to the scarred tissue narrowing and hardening the arteries. High blood pressure also causes your heart to work harder to get blood out to the body. High blood pressure raises your risk of heart attack, also known as acute myocardial infarction, or AMI, and especially stroke. The brain tissue is especially sensitive to high blood pressure damage. You're at risk if your blood pressure is 120/80 or higher.  Negative emotions  Chronic stress, pent-up anger, and other negative emotions have been linked to heart disease. This occurs because stress increases the levels of a hormone that increase the demand on your heart. Over time, these emotions could raise your heart disease risk.  Metabolic syndrome  This is caused by a combination of certain risk factors. It puts you at extra high risk of heart disease, stroke, and diabetes. You have metabolic syndrome if you have 3 or more of the following: low HDL cholesterol; high triglycerides; high blood pressure; high blood sugar; extra weight around the waist.  Diabetes  Diabetes occurs when you have high levels of sugar (glucose) in your blood. This can damage arteries if not kept under control. Having diabetes also makes you more likely to have a silent heart attack--one without any symptoms.  Excess weight  Excess weight makes other risk factors, such as diabetes, more likely. Excess weight around the waist or stomach increases your heart disease risk the most.  Lack of physical activity  When youre not active, youre more likely to develop diabetes, high blood pressure, high cholesterol levels, and excess weight.     Most people with heart disease have more  than one risk factor.   Date Last Reviewed: 3/28/2016  © 2842-3555 [employer]. [address] [place] [address], [place] [address]. All rights reserved. This information is not intended as a substitute for professional medical care. Always follow your healthcare professional's instructions.        Weight Management: Exercise and Activity  Studies show that people who exercise are the most likely to lose weight and keep it off. Exercise burns calories. It helps build muscle to make your body stronger. Make exercise an important part of your weight-management plan.    Make activity part of your day  You may not think you have the time to exercise. But you can work activity into your daily life--you just need to be committed. Take 10 minutes out of your lunch hour to take a walk. Walk to the [employer] to get your paper instead of having it delivered. Make it a habit to take the stairs instead of the elevator. Park in a far away parking spot instead of the closest. Youll be surprised at how fast these little changes can make a difference.  Some people really cannot walk very far, and tire out quickly with exercise. Instead of becoming discouraged, resolve to do what you can do, and work to make that a regular frequent habit.   The benefits of exercise  Exercise offers many benefits including:   · Exercise increases your metabolism (the speed at which your body burns calories).  · Regular exercise can increase the amount of muscle in your body. Muscle burns calories faster than fat. The more muscle you have, the more calories you burn.  · Exercise gives you energy and curbs your appetite.  · Exercise decreases stress and helps you sleep better.  Make exercise fun  Exercise can be fun. Choose an activity you enjoy. You may even get a friend to do it with you:  · Take a resistance-training or aerobics class  · Join a team sport  · Take a dance class  · Walk the dog  · Ride a bike  If you have health problems, be sure  to ask your healthcare provider before you start an exercise program. Have a  help you develop a plan thats safe for you.   Date Last Reviewed: 2/4/2016 © 2000-2016 [employer]. [address] [place] [address], [place] [address]. All rights reserved. This information is not intended as a substitute for professional medical care. Always follow your healthcare professional's instructions.             Language Assistance Services     ATTENTION: Language assistance services are available, free of charge. Please call [phone].      ATENCIÓN: Si sunnyla amie, tiene a reed disposición servicios gratuitos de asistencia lingüística. Llame al [phone].     CHÚ Ý: N?u b?n nói Ti?ng Vi?t, có các d?ch v? h? tr? ngôn ng? mi?n phí dành cho b?n. G?i s? [phone].         [facility] complies with applicable Federal civil rights laws and does not discriminate on the basis of race, color, national origin, age, disability, or sex.

## 2017-05-12 NOTE — PROGRESS NOTES
Subjective:    Patient ID:  Moiz Valencia is a 71 y.o. male who presents for Congestive Heart Failure  CAD  PRE-OP  HPI  S/P [facility] WITH CHF AND PNEUMONIA,BEFORE WITH BLADDER INFECTION, RECENT ER VISIT WITH CHEST SPASM THOUGHT SOMETHING WRONG WITH AICD, WENT TO ER , CXR OK, NOW NEEDS UMBILICAL HERNIA SURGERY, RECENT LABS NOTED, H/P LV ANEURYSM RESECTION,SEE ROS  Past Medical History:   Diagnosis Date    Acute coronary syndrome     CAD (coronary artery disease)     Cardiomyopathy     Carotid artery occlusion     CHF (congestive heart failure)     Diabetes mellitus, type 2     Heart murmur     Hypercholesteremia     Hypertension     Old MI (myocardial infarction)     Pneumonia     PUD (peptic ulcer disease)     Valvular regurgitation      Past Surgical History:   Procedure Laterality Date    CARDIAC PACEMAKER PLACEMENT      CARDIAC SURGERY      pt states that he had 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    COLONOSCOPY      CORONARY ANGIOPLASTY      CORONARY ARTERY BYPASS GRAFT      TONSILLECTOMY       Family History   Problem Relation Age of Onset    Diabetes Mother      Social History     Social History    Marital status:      Spouse name: N/A    Number of children: N/A    Years of education: N/A     Social History Main Topics    Smoking status: Former Smoker    Smokeless tobacco: None    Alcohol use No    Drug use: No    Sexual activity: Not Asked     Other Topics Concern    None     Social History Narrative       Review of patient's allergies indicates:  No Known Allergies    Current Outpatient Prescriptions:     allopurinol (ZYLOPRIM) 300 MG tablet, Take 300 mg by mouth once daily., Disp: , Rfl:     amiodarone (PACERONE) 200 MG Tab, Take 200 mg by mouth 2 (two) times daily., Disp: , Rfl:     aspirin (ECOTRIN) 325 MG EC tablet, Take 325 mg by mouth once daily., Disp: , Rfl:     atorvastatin (LIPITOR) 80 MG tablet, Take 40 mg by mouth once daily., Disp: , Rfl:      carvedilol (COREG) 25 MG tablet, Take 1 tablet (25 mg total) by mouth 2 (two) times daily with meals., Disp: 180 tablet, Rfl: 3    ENTRESTO 24-26 mg per tablet, Take one tablet by mouth twice daily., Disp: , Rfl: 3    etodolac (LODINE) 400 MG tablet, Take 400 mg by mouth 2 (two) times daily., Disp: , Rfl:     furosemide (LASIX) 20 MG tablet, 20 mg as needed (fluid). , Disp: , Rfl: 1    gabapentin (NEURONTIN) 100 MG capsule, Take 200 mg by mouth 3 (three) times daily. Takes 200mg, Disp: , Rfl:     insulin detemir (LEVEMIR) 100 unit/mL injection, Inject 38 Units into the skin 2 (two) times daily., Disp: , Rfl:     insulin glargine (LANTUS SOLOSTAR) 100 unit/mL (3 mL) InPn pen, Inject 38 Units into the skin 2 (two) times daily., Disp: 15 mL, Rfl: 3    JANUVIA 100 mg Tab, Take 1 tablet by mouth Daily., Disp: , Rfl: 2    magnesium oxide (MAG-OX) 400 mg tablet, Take one tablet daily., Disp: , Rfl: 0    metformin (GLUCOPHAGE) 1000 MG tablet, Take 1,000 mg by mouth 2 (two) times daily with meals., Disp: , Rfl:     paroxetine (PAXIL) 30 MG tablet, Take 30 mg by mouth 2 (two) times daily. , Disp: , Rfl:     tramadol (ULTRAM) 50 mg tablet, Take one tablet by mouth every 4-6 hours as needed for pain., Disp: , Rfl: 1    Current Facility-Administered Medications:     lidocaine (PF) 10 mg/ml (1%) injection 10 mg, 1 mL, Intradermal, Once, Aditya Zeng MD    Review of Systems   Constitution: Negative for chills, diaphoresis, weakness, malaise/fatigue and night sweats.   HENT: Negative for congestion and nosebleeds. Hearing loss: CHRONIC.    Eyes: Negative for blurred vision, discharge, double vision and visual disturbance.   Cardiovascular: Positive for dyspnea on exertion (MOD, 50 FEET). Negative for chest pain, claudication, cyanosis, irregular heartbeat, leg swelling, near-syncope, orthopnea (1 PILLOW), palpitations, paroxysmal nocturnal dyspnea and syncope.   Respiratory: Negative for cough, hemoptysis,  "sleep disturbances due to breathing, sputum production and wheezing.    Endocrine: Negative for cold intolerance, heat intolerance and polyuria.   Hematologic/Lymphatic: Negative for adenopathy. Does not bruise/bleed easily.   Skin: Negative for color change, itching and nail changes.   Musculoskeletal: Negative for falls and stiffness. Back pain: CHRONIC. Joint pain: R HIP.        LIMITED MOBILITY   Gastrointestinal: Negative for bloating, abdominal pain, change in bowel habit, constipation, dysphagia, flatus, heartburn, hematemesis, jaundice, melena and vomiting.   Genitourinary: Negative for dysuria, flank pain, frequency and incomplete emptying. Nocturia: ONCE.   Neurological: Negative for brief paralysis, difficulty with concentration, disturbances in coordination, dizziness, focal weakness, light-headedness, loss of balance, numbness, paresthesias, seizures, sensory change and tremors.   Psychiatric/Behavioral: Negative for altered mental status, depression, memory loss and substance abuse. The patient is not nervous/anxious.    Allergic/Immunologic: Negative for persistent infections.        Objective:      Vitals:    05/12/17 0948   BP: 120/62   Pulse: (!) 49   Weight: 105.7 kg (233 lb 0.4 oz)   Height: 5' 5" (1.651 m)   PainSc:   2     Body mass index is 38.78 kg/(m^2).    Physical Exam   Constitutional: He is oriented to person, place, and time. He appears well-developed and well-nourished. He is active.   OVERWEIGHT   HENT:   Head: Normocephalic and atraumatic.   Mouth/Throat: Oropharynx is clear and moist and mucous membranes are normal.   Eyes: Conjunctivae and EOM are normal. Pupils are equal, round, and reactive to light.   Neck: Normal range of motion. Neck supple. Normal carotid pulses, no hepatojugular reflux and no JVD present. Carotid bruit is not present. No tracheal deviation, no edema and no erythema present. No thyromegaly present.   Cardiovascular: Normal rate and regular rhythm.   No " extrasystoles are present. PMI is not displaced.  Exam reveals no gallop, no distant heart sounds, no friction rub and no midsystolic click.    Murmur heard.  High-pitched blowing holosystolic murmur is present with a grade of 1/6  at the apex   Systolic murmur is also present at the lower right sternal border.  Pulses:       Carotid pulses are 2+ on the right side, and 2+ on the left side.       Radial pulses are 2+ on the right side, and 2+ on the left side.        Femoral pulses are 2+ on the right side, and 2+ on the left side.       Dorsalis pedis pulses are 2+ on the right side, and 2+ on the left side.        Posterior tibial pulses are 2+ on the right side, and 2+ on the left side.   Pulmonary/Chest: Effort normal and breath sounds normal. No accessory muscle usage. No tachypnea and no bradypnea. No respiratory distress.   STERNOTOMY   Abdominal: Soft. Bowel sounds are normal. He exhibits no distension and no mass. There is no hepatosplenomegaly. There is no tenderness. There is no CVA tenderness.   Musculoskeletal: Normal range of motion. He exhibits no edema or deformity.   LIMITED MOBILITY, WALKER   Lymphadenopathy:     He has no cervical adenopathy.   Neurological: He is alert and oriented to person, place, and time. He has normal strength. He displays no tremor. No cranial nerve deficit. He exhibits normal muscle tone. Coordination normal.   Skin: Skin is warm and dry. No cyanosis or erythema. No pallor.   Psychiatric: He has a normal mood and affect. His speech is normal and behavior is normal. Judgment and thought content normal.               ..    Chemistry        Component Value Date/Time     05/10/2017 1322    K 4.4 05/10/2017 1322     05/10/2017 1322    CO2 23 05/10/2017 1322    BUN 19 05/10/2017 1322    CREATININE 0.9 05/10/2017 1322     (H) 05/10/2017 1322        Component Value Date/Time    CALCIUM 9.6 05/10/2017 1322    ALKPHOS 79 05/10/2017 1322    AST 12 05/10/2017 1322     AST 15 (L) 11/10/2015 0819    ALT 13 05/10/2017 1322    BILITOT 0.8 05/10/2017 1322            ..  Lab Results   Component Value Date    CHOL 143 11/10/2015     Lab Results   Component Value Date    HDL 49 11/10/2015     Lab Results   Component Value Date    LDLCALC 57.0 (L) 11/10/2015     Lab Results   Component Value Date    TRIG 185 (H) 11/10/2015     Lab Results   Component Value Date    CHOLHDL 34.3 11/10/2015     ..  Lab Results   Component Value Date    WBC 7.60 05/10/2017    HGB 12.3 (L) 05/10/2017    HCT 37.7 (L) 05/10/2017    MCV 92 05/10/2017     05/10/2017       Test(s) Reviewed  I have reviewed the following in detail:  [] Stress test   [] Angiography   [] Echocardiogram   [x] Labs   [x] Other:       Assessment:         ICD-10-CM ICD-9-CM   1. Encounter for pre-operative cardiovascular clearance Z01.810 V72.81   2. Left heart failure I50.1 428.1   3. Long term current use of amiodarone Z79.899 V58.69   4. ICD (implantable cardioverter-defibrillator) in place Z95.810 V45.02   5. Coronary artery disease involving native coronary artery of native heart without angina pectoris I25.10 414.01   6. Hypercholesterolemia E78.00 272.0     Problem List Items Addressed This Visit        Cardiology Problems    CAD (coronary artery disease)    Relevant Orders    Comprehensive metabolic panel    Lipid panel    TSH    T3    T4    Left heart failure    Relevant Orders    Comprehensive metabolic panel    Lipid panel    TSH    T3    T4    Hypercholesterolemia    Relevant Orders    Comprehensive metabolic panel    Lipid panel    TSH    T3    T4       Other    Long term current use of amiodarone    Relevant Orders    Comprehensive metabolic panel    Lipid panel    TSH    T3    T4    ICD (implantable cardioverter-defibrillator) in place    Relevant Orders    Comprehensive metabolic panel    Lipid panel    TSH    T3    T4    Encounter for pre-operative cardiovascular clearance - Primary    Relevant Orders     Comprehensive metabolic panel    Lipid panel    TSH    T3    T4           Plan:     LOW RISK SURGERY, BUT MOD CV RISK WITH SEVERE LV DYSFUNCTION, AVOID VOLUME OVERLOAD, DIET, DAILY WEIGHT, ALL OTHER CV CLINICALLY STABLE, NO ANGINA, NO OVERT HF, NO TIA, NO CLINICAL ARRHYTHMIA,CONTINUE CURRENT MEDS, EDUCATION, DIET, EXERCISE, WEIGHT LOSS, RTC IN 3 MO WITH LABS, DAILY MEDS, ENTRESTO,      Encounter for pre-operative cardiovascular clearance  Comments:  MOD RISK  Orders:  -     Comprehensive metabolic panel; Future; Expected date: 5/12/17  -     Lipid panel; Future; Expected date: 5/12/17  -     TSH; Future; Expected date: 5/12/17  -     T3; Future; Expected date: 5/12/17  -     T4; Future; Expected date: 5/12/17    Left heart failure  Comments:  CLASS 2, SEVERE LV DYSFUNCTION  Orders:  -     Comprehensive metabolic panel; Future; Expected date: 5/12/17  -     Lipid panel; Future; Expected date: 5/12/17  -     TSH; Future; Expected date: 5/12/17  -     T3; Future; Expected date: 5/12/17  -     T4; Future; Expected date: 5/12/17    Long term current use of amiodarone  Comments:  LABS  Orders:  -     Comprehensive metabolic panel; Future; Expected date: 5/12/17  -     Lipid panel; Future; Expected date: 5/12/17  -     TSH; Future; Expected date: 5/12/17  -     T3; Future; Expected date: 5/12/17  -     T4; Future; Expected date: 5/12/17    ICD (implantable cardioverter-defibrillator) in place  Comments:  CHECK  Orders:  -     Comprehensive metabolic panel; Future; Expected date: 5/12/17  -     Lipid panel; Future; Expected date: 5/12/17  -     TSH; Future; Expected date: 5/12/17  -     T3; Future; Expected date: 5/12/17  -     T4; Future; Expected date: 5/12/17    Coronary artery disease involving native coronary artery of native heart without angina pectoris  Comments:  STABLE  Orders:  -     Comprehensive metabolic panel; Future; Expected date: 5/12/17  -     Lipid panel; Future; Expected date: 5/12/17  -     TSH;  Future; Expected date: 5/12/17  -     T3; Future; Expected date: 5/12/17  -     T4; Future; Expected date: 5/12/17    Hypercholesterolemia  -     Comprehensive metabolic panel; Future; Expected date: 5/12/17  -     Lipid panel; Future; Expected date: 5/12/17  -     TSH; Future; Expected date: 5/12/17  -     T3; Future; Expected date: 5/12/17  -     T4; Future; Expected date: 5/12/17    RTC Low level/low impact aerobic exercise 5x's/wk. Heart healthy diet and risk factor modification.    See labs and med orders.    Aerobic exercise 5x's/wk. Heart healthy diet and risk factor modification.    See labs and med orders.

## 2017-06-05 PROBLEM — K43.2 INCISIONAL HERNIA, WITHOUT OBSTRUCTION OR GANGRENE: Status: ACTIVE | Noted: 2017-06-05

## 2017-06-27 PROBLEM — M54.17 THORACIC AND LUMBOSACRAL NEURITIS: Status: ACTIVE | Noted: 2017-06-27

## 2017-06-27 PROBLEM — I21.9 MYOCARDIAL INFARCTION: Status: ACTIVE | Noted: 2017-06-27

## 2017-06-27 PROBLEM — Z78.9 NON-SMOKER: Status: ACTIVE | Noted: 2017-06-27

## 2017-06-27 PROBLEM — I51.9 CARDIAC DISEASE: Status: ACTIVE | Noted: 2017-06-27

## 2017-06-27 PROBLEM — M54.14 THORACIC AND LUMBOSACRAL NEURITIS: Status: ACTIVE | Noted: 2017-06-27

## 2017-06-27 PROBLEM — M48.00 SPINAL STENOSIS: Status: ACTIVE | Noted: 2017-06-27

## 2017-06-27 PROBLEM — H91.93 BILATERAL DEAFNESS: Status: ACTIVE | Noted: 2017-06-27

## 2017-06-27 PROBLEM — M45.9 ANKYLOSING SPONDYLITIS: Status: ACTIVE | Noted: 2017-06-27

## 2017-06-27 PROBLEM — F32.A DEPRESSION: Status: ACTIVE | Noted: 2017-06-27

## 2017-06-27 PROBLEM — E66.9 ADIPOSITY: Status: ACTIVE | Noted: 2017-05-12

## 2017-06-27 PROBLEM — M19.90 ARTHRITIS: Status: ACTIVE | Noted: 2017-06-27

## 2017-06-27 PROBLEM — F17.200 CURRENT SMOKER: Status: ACTIVE | Noted: 2017-06-27

## 2017-06-27 PROBLEM — E66.9 OBESITY WITH BODY MASS INDEX 30 OR GREATER: Status: ACTIVE | Noted: 2017-06-27

## 2017-07-27 ENCOUNTER — HOSPITAL ENCOUNTER (EMERGENCY)
Facility: OTHER | Age: 72
Discharge: HOME | End: 2017-07-27
Attending: EMERGENCY MEDICINE
Payer: MEDICARE

## 2017-07-27 VITALS
RESPIRATION RATE: 15 BRPM | HEART RATE: 56 BPM | HEIGHT: 66 IN | TEMPERATURE: 99 F | OXYGEN SATURATION: 95 % | WEIGHT: 225 LBS | SYSTOLIC BLOOD PRESSURE: 109 MMHG | DIASTOLIC BLOOD PRESSURE: 61 MMHG | BODY MASS INDEX: 36.16 KG/M2

## 2017-07-27 DIAGNOSIS — I95.9 HYPOTENSION, UNSPECIFIED HYPOTENSION TYPE: Primary | ICD-10-CM

## 2017-07-27 LAB
ANION GAP SERPL CALC-SCNC: 10 MMOL/L
BASOPHILS # BLD AUTO: 0.03 K/UL
BASOPHILS NFR BLD: 0.5 %
BUN SERPL-MCNC: 19 MG/DL
CALCIUM SERPL-MCNC: 9.1 MG/DL
CHLORIDE SERPL-SCNC: 106 MMOL/L
CO2 SERPL-SCNC: 24 MMOL/L
CREAT SERPL-MCNC: 1.1 MG/DL
DIFFERENTIAL METHOD: ABNORMAL
EOSINOPHIL # BLD AUTO: 0.3 K/UL
EOSINOPHIL NFR BLD: 4.5 %
ERYTHROCYTE [DISTWIDTH] IN BLOOD BY AUTOMATED COUNT: 13.4 %
EST. GFR  (AFRICAN AMERICAN): >60 ML/MIN/1.73 M^2
EST. GFR  (NON AFRICAN AMERICAN): >60 ML/MIN/1.73 M^2
GLUCOSE SERPL-MCNC: 180 MG/DL
HCT VFR BLD AUTO: 40.6 %
HGB BLD-MCNC: 13.4 G/DL
LYMPHOCYTES # BLD AUTO: 1.4 K/UL
LYMPHOCYTES NFR BLD: 24.7 %
MCH RBC QN AUTO: 30.6 PG
MCHC RBC AUTO-ENTMCNC: 33 G/DL
MCV RBC AUTO: 93 FL
MONOCYTES # BLD AUTO: 0.6 K/UL
MONOCYTES NFR BLD: 10.9 %
NEUTROPHILS # BLD AUTO: 3.4 K/UL
NEUTROPHILS NFR BLD: 58.9 %
PLATELET # BLD AUTO: 229 K/UL
PMV BLD AUTO: 10.3 FL
POCT GLUCOSE: 176 MG/DL (ref 70–110)
POTASSIUM SERPL-SCNC: 4 MMOL/L
RBC # BLD AUTO: 4.38 M/UL
SODIUM SERPL-SCNC: 140 MMOL/L
WBC # BLD AUTO: 5.79 K/UL

## 2017-07-27 PROCEDURE — 80048 BASIC METABOLIC PNL TOTAL CA: CPT

## 2017-07-27 PROCEDURE — 99284 EMERGENCY DEPT VISIT MOD MDM: CPT | Mod: 25

## 2017-07-27 PROCEDURE — 85025 COMPLETE CBC W/AUTO DIFF WBC: CPT

## 2017-07-27 PROCEDURE — 82962 GLUCOSE BLOOD TEST: CPT

## 2017-07-27 PROCEDURE — 96360 HYDRATION IV INFUSION INIT: CPT

## 2017-07-27 PROCEDURE — 25000003 PHARM REV CODE 250: Performed by: EMERGENCY MEDICINE

## 2017-07-27 RX ORDER — GLIPIZIDE 5 MG/1
5 TABLET ORAL
COMMUNITY
End: 2017-08-16

## 2017-07-27 RX ORDER — FERROUS SULFATE 325(65) MG
325 TABLET ORAL
COMMUNITY
End: 2017-08-16

## 2017-07-27 RX ORDER — SODIUM CHLORIDE 9 MG/ML
500 INJECTION, SOLUTION INTRAVENOUS
Status: COMPLETED | OUTPATIENT
Start: 2017-07-27 | End: 2017-07-27

## 2017-07-27 RX ADMIN — SODIUM CHLORIDE 500 ML: 0.9 INJECTION, SOLUTION INTRAVENOUS at 03:07

## 2017-07-27 NOTE — ED PROVIDER NOTES
Encounter Date: 7/27/2017    SCRIBE #1 NOTE: I, Sheila Caban, am scribing for, and in the presence of,  Dr. Adrian TEJEDA have scribed the entire note.       History     Chief Complaint   Patient presents with    Hypotension     [facility] took his BP medicine this am; pt received 300cc's NS, 100% paced rhythm.  Pt reports he takes Lisinopril 2.5mg every 3 days for BP and last dose this am     Time seen by provider: 3:32 PM    This is a 71 y.o. male who presents to ED due to hypotension today. Pt took his blood pressure medication, Lisinopril 2.5mg, this morning. He took it as instructed, every 3 days. He was [facility] and felt lightheaded when getting out of the car. [facility] that his blood pressure was 88/50 at arrival, he usually has a systolic blood pressure of 100 or more. Pt states that he drank water, ate, and did not spend excessive time in the heat today.  Pt only takes his lisinopril every 3 days bc then his blood pressure is too low.  He denies any current lightheadedness and feels better at this time. He denies any LOC, N/V, and fatigue. Pt has no complaints at the moment. His PMHx includes, but is not limited to CHF, CAD, NIDDM, HTN, cardiomyopathy, peptic ulcer disease, acute coronary syndrome, carotid occlusion, heart murmur, and a MI. He has a PSHx including, but not limited to, cardiac pacemaker placement, cardiac surgery, and coronary artery bypass graft.        The history is provided by the patient.     Review of patient's allergies indicates:  No Known Allergies  Past Medical History:   Diagnosis Date    Acute coronary syndrome     CAD (coronary artery disease)     Cardiomyopathy     Carotid artery occlusion     CHF (congestive heart failure)     Diabetes mellitus, type 2     Heart murmur     Hypercholesteremia     Hypertension     Old MI (myocardial infarction)     Pneumonia     PUD (peptic ulcer  disease)     Sleep apnea     Valvular regurgitation      Past Surgical History:   Procedure Laterality Date    CARDIAC PACEMAKER PLACEMENT      CARDIAC SURGERY      pt states that he had 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    COLONOSCOPY      CORONARY ANGIOPLASTY      CORONARY ARTERY BYPASS GRAFT      HERNIA REPAIR      INCISIONAL HERNIA REPAIR  06/05/2017    TONSILLECTOMY       Family History   Problem Relation Age of Onset    Diabetes Mother      Social History   Substance Use Topics    Smoking status: Former Smoker     Quit date: 6/2/1984    Smokeless tobacco: Current User     Types: Chew    Alcohol use No     Review of Systems   Constitutional: Negative for chills, fatigue and fever.   HENT: Negative for ear pain and sore throat.    Respiratory: Negative for cough, shortness of breath and wheezing.    Cardiovascular: Negative for chest pain.   Gastrointestinal: Negative for abdominal pain, diarrhea, nausea and vomiting.   Genitourinary: Negative for dysuria and urgency.   Musculoskeletal: Negative for back pain and neck pain.   Skin: Negative for color change, pallor, rash and wound.   Neurological: Positive for light-headedness (now resolved). Negative for dizziness, syncope, weakness, numbness and headaches.   Hematological: Does not bruise/bleed easily.   Psychiatric/Behavioral: Negative for behavioral problems and decreased concentration.       Physical Exam     Initial Vitals [07/27/17 1507]   BP Pulse Resp Temp SpO2   (!) 90/48 (!) 50 16 98.5 °F (36.9 °C) 98 %      MAP       62         Physical Exam    Nursing note and vitals reviewed.  Constitutional: He appears well-developed and well-nourished. He is not diaphoretic. No distress.   HENT:   Head: Normocephalic and atraumatic.   Right Ear: External ear normal.   Left Ear: External ear normal.   Eyes: Right eye exhibits no discharge. Left eye exhibits no discharge.   Neck: Normal range of motion. Neck supple.   Cardiovascular:  Normal rate, regular rhythm, normal heart sounds and intact distal pulses.   Pulmonary/Chest: Breath sounds normal. No respiratory distress. He has no wheezes. He has no rhonchi. He has no rales. He exhibits no tenderness.   Abdominal: Soft. Bowel sounds are normal. He exhibits no distension. There is no tenderness. There is no rebound and no guarding.   Musculoskeletal: Normal range of motion. He exhibits no edema or tenderness.   Neurological: He is alert and oriented to person, place, and time. He has normal strength. No sensory deficit.   Skin: Skin is warm and dry. No rash noted. No erythema.   Psychiatric: He has a normal mood and affect. His behavior is normal. Judgment and thought content normal.         ED Course   Procedures  Labs Reviewed   BASIC METABOLIC PANEL - Abnormal; Notable for the following:        Result Value    Glucose 180 (*)     All other components within normal limits   CBC W/ AUTO DIFFERENTIAL - Abnormal; Notable for the following:     RBC 4.38 (*)     Hemoglobin 13.4 (*)     All other components within normal limits   POCT GLUCOSE - Abnormal; Notable for the following:     POCT Glucose 176 (*)     All other components within normal limits     EKG Readings: (Independently Interpreted)   3:33PM:  Rate of 49.  Sinus bradycardia.  LAD.  Widened QRS with nonspecific ventricular block.  No other ST or ischemic changes.            Medical Decision Making:   History:   Old Medical Records: I decided to obtain old medical records.  Old Records Summarized: records from clinic visits and other records.  Initial Assessment:   3:32PM:  Pt is a 72 y/o M who presents to ED with feeling lightheaded, found to have low blood pressure. Pt has been weaning off his BP meds bc of a hx of low blood pressure. Pt states at baseline, he is in the low 100s SBP.  Pt appears well, nontoxic, neurologically intact at this time, asymtomatic.  Pt also on carvedilol as well. Will plan for labs, IVFs, will continue to  follow and reassess.                 Independently Interpreted Test(s):   I have ordered and independently interpreted EKG Reading(s) - see prior notes  Clinical Tests:   Lab Tests: Ordered and Reviewed  Medical Tests: Ordered and Reviewed    5:28 PM:  Pt doing well. He was able to ambulate with no issues and remained asymptomatic.  He is ready and wanting to go home.   His labs are all WNL.  His BP has improved after IVFs.   I updated pt regarding results.  I counseled pt regarding supportive care measures.  Will have him hold off on his lisinopril, would even consider backing off on his carvedilol as well.  I have discussed with the pt ED return warnings and need for close PCP f/u.  Pt agreeable to plan and all questions answered.  I feel that pt is stable for discharge and management as an outpatient and no further intervention is needed at this time.  Pt is comfortable returning to the ED if needed.  Will DC home in stable condition.                  Scribe Attestation:   Scribe #1: I performed the above scribed service and the documentation accurately describes the services I performed. I attest to the accuracy of the note.    Attending Attestation:           Physician Attestation for Scribe:  Physician Attestation Statement for Scribe #1: I, Dr. Frankel, reviewed documentation, as scribed by Sheila Caban in my presence, and it is both accurate and complete.                 ED Course     Clinical Impression:     1. Hypotension, unspecified hypotension type                                 Carlotta Frankel MD  07/27/17 5683

## 2017-07-27 NOTE — ED NOTES
Pt ambulated w/ minimal assistance from nurse needed in ED MD juliane witnessed. Respirations remained even and non labored w/ no distress noted. Pt denies dizziness, CP, SOB, blurred vision, HA or weakness upon ambulation.

## 2017-07-27 NOTE — DISCHARGE INSTRUCTIONS
Please return to the ER if you have chest pain, difficulty breathing, fevers, altered mental status, dizziness, weakness, or any other concerns.        Follow up with your primary care physician.

## 2017-07-27 NOTE — ED NOTES
Patient identifiers verified and correct for Moiz F Pelican.    LOC: The patient is awake, alert and aware of environment with an appropriate affect, the patient is oriented x 3 and speaking appropriately. Pt denies HA at this time.   APPEARANCE: Patient resting comfortably and in no acute distress, patient is clean and well groomed, patient's clothing is properly fastened.  SKIN: The skin is warm and dry, color consistent with ethnicity, patient has normal skin turgor and moist mucus membranes, skin intact, no breakdown or bruising noted.  MUSCULOSKELETAL: Patient moving all extremities spontaneously, no obvious swelling or deformities noted.  RESPIRATORY: Airway is open and patent, respirations are spontaneous, patient has a normal effort and rate, no accessory muscle use noted, bilateral breath sounds clear.  CARDIAC: Patient has a normal rate and regular rhythm, no periphreal edema noted, capillary refill < 3 seconds. Pt denies chest pain, SOB, dizziness or blurred vision at this time.   ABDOMEN: Soft and non tender to palpation, no distention noted. Pt denies abdominal pains, tenderness or discomfort, N/V/D at this time.   NEUROLOGIC: PERRL, 3 mm bilaterally, eyes open spontaneously, behavior appropriate to situation, follows commands, facial expression symmetrical, bilateral hand grasp equal and even, purposeful motor response noted, normal sensation in all extremities when touched with a finger.

## 2017-07-27 NOTE — ED NOTES
Discharge information, new medication prescriptions, follow-up care, community resources and home care discussed with patient and family. Pt reports understanding and denies further questions or concerns at this time. Pt transported to via wheelchair to registration desk w/ green folder containing all discharge paperwork and prescriptions.

## 2017-07-27 NOTE — ED TRIAGE NOTES
"Pt presents to ER w/ + dizziness and hypotension while at a dental appointment today. Pt reports taking prescribed  Lisinopril " I take it when I need it. I took it this morning because I knew it would be high going to the dentist". Denies LOC, falls, chest pains, SOB, N/V/D, abd pains, blurred vision or dizziness at this time.   "

## 2017-08-02 ENCOUNTER — LAB VISIT (OUTPATIENT)
Dept: LAB | Facility: HOSPITAL | Age: 72
End: 2017-08-02
Attending: INTERNAL MEDICINE
Payer: MEDICARE

## 2017-08-02 DIAGNOSIS — Z95.810 ICD (IMPLANTABLE CARDIOVERTER-DEFIBRILLATOR) IN PLACE: ICD-10-CM

## 2017-08-02 DIAGNOSIS — E78.00 HYPERCHOLESTEROLEMIA: ICD-10-CM

## 2017-08-02 DIAGNOSIS — I25.10 CORONARY ARTERY DISEASE INVOLVING NATIVE CORONARY ARTERY OF NATIVE HEART WITHOUT ANGINA PECTORIS: ICD-10-CM

## 2017-08-02 DIAGNOSIS — I50.1 LEFT HEART FAILURE: ICD-10-CM

## 2017-08-02 DIAGNOSIS — Z79.899 LONG TERM CURRENT USE OF AMIODARONE: ICD-10-CM

## 2017-08-02 DIAGNOSIS — Z01.810 ENCOUNTER FOR PRE-OPERATIVE CARDIOVASCULAR CLEARANCE: ICD-10-CM

## 2017-08-02 LAB
ALBUMIN SERPL BCP-MCNC: 3.6 G/DL
ALP SERPL-CCNC: 93 U/L
ALT SERPL W/O P-5'-P-CCNC: 20 U/L
ANION GAP SERPL CALC-SCNC: 9 MMOL/L
AST SERPL-CCNC: 17 U/L
BILIRUB SERPL-MCNC: 0.5 MG/DL
BUN SERPL-MCNC: 20 MG/DL
CALCIUM SERPL-MCNC: 9.6 MG/DL
CHLORIDE SERPL-SCNC: 105 MMOL/L
CHOLEST/HDLC SERPL: 3.8 {RATIO}
CO2 SERPL-SCNC: 26 MMOL/L
CREAT SERPL-MCNC: 0.9 MG/DL
EST. GFR  (AFRICAN AMERICAN): >60 ML/MIN/1.73 M^2
EST. GFR  (NON AFRICAN AMERICAN): >60 ML/MIN/1.73 M^2
GLUCOSE SERPL-MCNC: 192 MG/DL
HDL/CHOLESTEROL RATIO: 26 %
HDLC SERPL-MCNC: 192 MG/DL
HDLC SERPL-MCNC: 50 MG/DL
LDLC SERPL CALC-MCNC: 122 MG/DL
NONHDLC SERPL-MCNC: 142 MG/DL
POTASSIUM SERPL-SCNC: 5.1 MMOL/L
PROT SERPL-MCNC: 7 G/DL
SODIUM SERPL-SCNC: 140 MMOL/L
T3 SERPL-MCNC: 69 NG/DL
T4 SERPL-MCNC: 5.6 UG/DL
TRIGL SERPL-MCNC: 100 MG/DL
TSH SERPL DL<=0.005 MIU/L-ACNC: 3.06 UIU/ML

## 2017-08-02 PROCEDURE — 84436 ASSAY OF TOTAL THYROXINE: CPT

## 2017-08-02 PROCEDURE — 36415 COLL VENOUS BLD VENIPUNCTURE: CPT | Mod: PO

## 2017-08-02 PROCEDURE — 84443 ASSAY THYROID STIM HORMONE: CPT

## 2017-08-02 PROCEDURE — 80053 COMPREHEN METABOLIC PANEL: CPT

## 2017-08-02 PROCEDURE — 80061 LIPID PANEL: CPT

## 2017-08-02 PROCEDURE — 84480 ASSAY TRIIODOTHYRONINE (T3): CPT

## 2017-08-16 ENCOUNTER — OFFICE VISIT (OUTPATIENT)
Dept: CARDIOLOGY | Facility: CLINIC | Age: 72
End: 2017-08-16
Payer: MEDICARE

## 2017-08-16 VITALS
BODY MASS INDEX: 36.24 KG/M2 | HEIGHT: 66 IN | SYSTOLIC BLOOD PRESSURE: 96 MMHG | WEIGHT: 225.5 LBS | DIASTOLIC BLOOD PRESSURE: 54 MMHG | HEART RATE: 51 BPM

## 2017-08-16 DIAGNOSIS — I50.1 LEFT HEART FAILURE: Primary | ICD-10-CM

## 2017-08-16 DIAGNOSIS — I25.10 CORONARY ARTERY DISEASE INVOLVING NATIVE CORONARY ARTERY OF NATIVE HEART WITHOUT ANGINA PECTORIS: ICD-10-CM

## 2017-08-16 DIAGNOSIS — Z79.899 LONG TERM CURRENT USE OF AMIODARONE: ICD-10-CM

## 2017-08-16 DIAGNOSIS — Z95.810 ICD (IMPLANTABLE CARDIOVERTER-DEFIBRILLATOR) IN PLACE: ICD-10-CM

## 2017-08-16 DIAGNOSIS — E78.00 HYPERCHOLESTEROLEMIA: ICD-10-CM

## 2017-08-16 PROCEDURE — 99214 OFFICE O/P EST MOD 30 MIN: CPT | Mod: S$GLB,,, | Performed by: INTERNAL MEDICINE

## 2017-08-16 PROCEDURE — 1125F AMNT PAIN NOTED PAIN PRSNT: CPT | Mod: S$GLB,,, | Performed by: INTERNAL MEDICINE

## 2017-08-16 PROCEDURE — 3078F DIAST BP <80 MM HG: CPT | Mod: S$GLB,,, | Performed by: INTERNAL MEDICINE

## 2017-08-16 PROCEDURE — 3074F SYST BP LT 130 MM HG: CPT | Mod: S$GLB,,, | Performed by: INTERNAL MEDICINE

## 2017-08-16 PROCEDURE — 1159F MED LIST DOCD IN RCRD: CPT | Mod: S$GLB,,, | Performed by: INTERNAL MEDICINE

## 2017-08-16 RX ORDER — SAXAGLIPTIN 5 MG/1
5 TABLET, FILM COATED ORAL DAILY
COMMUNITY
End: 2018-12-26

## 2017-08-16 RX ORDER — AMIODARONE HYDROCHLORIDE 200 MG/1
200 TABLET ORAL 2 TIMES DAILY
Qty: 180 TABLET | Refills: 1 | Status: SHIPPED | OUTPATIENT
Start: 2017-08-16 | End: 2019-03-28 | Stop reason: SDUPTHER

## 2017-08-16 NOTE — PATIENT INSTRUCTIONS
Left-Sided Congestive Heart Failure    The heart is a large muscle that pumps blood throughout the body. Blood carries oxygen to all the organs (including the brain), muscles, and skin of your body. After the body takes the oxygen out of the blood, the blood returns to the heart. The right side of the heart collects that blood and pumps it to the lungs to receive fresh oxygen. This oxygen-rich blood from the lungs then returns to the left side of the heart, where it is pumped back out to the brain and rest of the body, starting the process all over.   Heart failure occurs when the heart muscle is weakened. This can occur after a heart attack or with significant coronary artery disease. This affects the pumping action of the heart.   When the left side of the heart is weakened, it cant handle the blood it is getting from the lungs. Pressure then builds up in the veins of the lungs, causing fluid to leak into the lung tissues. This may be referred to as congestive heart failure. This causes you to feel short of breath, weak, or dizzy. These symptoms are often worse with exertion, such as climbing stairs or walking up hills. Lying flat is uncomfortable and can make your breathing worse. This may make sleeping difficult and force you to use extra pillows to elevate your upper body to help you sleep well.  Causes of heart failure include:  · Coronary artery disease  · Heart attack in the past (also known as acute myocardial infarction, or AMI)  · High blood pressure  · Damaged heart valve  · Diabetes  · Obesity  · Cigarette smoking  · Alcohol abuse  Treatment  Heart failure is a chronic condition. There is no cure. The purpose of medical treatment is to improve the pumping action of the heart, and remove excess water and fluids from the body. A number of medicines can help reach this goal, improve symptoms and keep the heart from becoming weaker. In some cases of severe heart failure, a mechanical device will be  placed in the heart to help the heart pump. Another major goal is to better treat the causes of heart failure, such as diabetes, high blood pressure, and your lifestyle.  Home care  · Check your weight every day. A sudden increase in weight gain could mean worsening heart failure.  ¨ Use the same scale every day.  ¨ Weigh yourself at the same time every day.  ¨ Make sure the scale is on the floor, not on a rug.  ¨ Keep a record of your weight every day, so your healthcare provider can see it. If you are not given a log sheet for this, keep a separate journal for this purpose.   · Cut back on how much salt (sodium) you eat:  ¨ Your provider will tell you how much salt to have daily, usually 2,000 mg or less.  ¨ Avoid high-salt foods. These include olives, pickles, smoked meats, processed foods, and salted potato chips.  ¨ Don't add salt to your food at the table. Use only small amounts of salt when cooking.  ¨ Avoid binging on salt-heavy meals.  · Follow your healthcare provider's recommendations about how much fluid you should have.  · Stop smoking.  · Cut back on the amount of alcohol you drink.  · Lose weight if you are overweight. The excess weight adds a lot of stress on the workload of the heart.  · Stay active. Talk to your provider about an exercise program that is safe for your heart.  · Keep your feet elevated to reduce swelling. Ask your provider about support hose as a preventive treatment for daytime leg swelling.  · Follow your healthcare provider's instructions closely.  Besides taking your medicine as instructed, an important part of treatment includes lifestyle changes. These include diet, physical activity, stopping smoking, and weight control.  Improve your diet. Often in the hospital, people are given a heart healthy diet. This includes more fresh foods, lower saturated fat, less processed foods, and lower salt.  Follow-up care  Follow up with your healthcare provider, or as advised. Make sure to  keep any appointments that were made for you. This can help better control heart failure.  If an X-ray was done, you will be told of any new findings that may affect your care.  Call 911  Call 911 if you:  · Become severely short of breath  · Feel lightheaded, or feel like you might pass out or faint  · Have chest pain or discomfort that is different than usual, the medicines your provider told you to use for this don't help, or the pain lasts longer than 10 to 15 minutes  · Develop a rapid heart rate suddenly  When to seek medical advice  Call your healthcare provider right away if you have any of these signs of worsening heart failure:  · Sudden weight gain --  more than 2 pounds in 1 day, or 5 pounds in 1 week, or whatever weight gain you were told to report by your provider  · Trouble breathing not related to being active  · New or increased swelling of your legs or ankles  · Swelling or pain in your abdomen  · Breathing trouble at night, waking up short of breath or needing more pillows to elevate your upper body to help you breathe  · Frequent coughing that doesnt go away  · Feeling much more tired than usual  Date Last Reviewed: 1/4/2016  © 0091-2165 [employer]. [address] [place] [address], [place] [address]. All rights reserved. This information is not intended as a substitute for professional medical care. Always follow your healthcare professional's instructions.        Diabetes and Heart Disease     Take your medicines as directed each day, even if you feel fine.   If you have diabetes, you are two to four times more likely to have heart disease than someone without diabetes. This higher risk is due to diabetes, but it is also due to other risk factors for heart disease that happen in people with diabetes. But theres good news. You can help control your health risks by making some changes in your life. You can take steps to reduce your risk of heart disease by half--similar to the risk in  people who don't have diabetes.  Your main risk factors  Three major risk factors for heart disease are high blood sugar, high blood pressure, and high levels of lipids. By keeping risk factors under control, you can help keep your heart and arteries healthy. This may reduce your chances of a heart attack.  · Blood sugar. High blood sugar can make artery walls tough and rough. Plaque (waxy material in the blood) can then build up along the artery walls, making it harder for blood to flow through the arteries. Having high blood sugar increases the chances of having high blood pressure and high cholesterol.  · Blood pressure. When blood pressure is high all the time it causes your heart to work harder to pump blood. Artery walls become damaged. This increases the risk for plaque build up.  · Lipids. The body needs some lipids in the blood to stay healthy. But lipid levels that are too high can damage the artery walls. Lipids include cholesterol and triglycerides. There are two kinds of cholesterol. LDL (bad) cholesterol can damage the arteries. But HDL (good) cholesterol helps clear LDL cholesterol from the blood vessels. This helps keep the arteries healthy. When blood sugar is high, the level of triglycerides in the blood may also be high. High blood triglyceride levels can cause plaque to form.   Other risk factors  Certain lifestyle factors can increase levels of your blood sugar, blood pressure, and lipids. Such increases raise your risk of heart disease:  · Smoking damages the lining of your arteries. This allows plaque to build up in the artery walls. Smoking also constricts (narrows) the arteries. This can raise blood pressure and cause chest pain or angina. Smoking also increases your risk of getting type 2 diabetes.  · Not being active makes it harder for your heart to do its work. Inactivity is linked to many other risk factors, such as high blood pressure and poor cholesterol levels. Inactivity also  increases your risk of getting type 2 diabetes.  · Being overweight makes it harder for your body to use insulin. It also makes your heart work too hard. Being overweight is also the main contributor to the development of type 2 diabetes,   Changes you can make  Following a few simple steps can help keep your risk factors under control. Work with your healthcare team to reach your goals.  · Quitting smoking could save your life. Smoking damages the lining of the blood vessels and raises blood pressure. Smoking also affects how your body uses insulin. This makes it harder to keep blood sugar under control. If you smoke and need help quitting, talk to your healthcare team.   · Testing your blood sugar is the only way to know whether it is under control. Be sure to test your blood sugar yourself. Also get your blood tested in the lab, as directed.  · Monitoring your blood pressure and lipid levels can help you achieve safe levels. Visit your healthcare team as scheduled.  · Taking medicines as directed can help control blood sugar, blood pressure, blood clotting, and/or cholesterol levels.  · Eating right can reduce your risk factors and help you lose weight. Try to limit the amount of processed or refined carbohydrates you eat at one time. Cut back on your total calorie intake. Eat foods low in saturated fat and cholesterol. Eat fiber, including vegetables and whole grains, and cut down on salt. A dietitian or diabetes educator can help form a meal plan that works for you--even if you are on a low budget.   · Being active can help reduce your weight, strengthen your heart, and lower your lipid levels and blood pressure. Exercise and activity are good for your whole body. Talk to your healthcare team about increasing your activity safely over time.  · Keeping your appointments with your healthcare provider helps you stay healthy. Go in for checkups and lab tests as scheduled.  Date Last Reviewed: 5/19/2016  © 9239-7197  [employer]. [address] [place] [address], [place] [address]. All rights reserved. This information is not intended as a substitute for professional medical care. Always follow your healthcare professional's instructions.        Exercise for a Healthier Heart  You may wonder how you can improve the health of your heart. If youre thinking about exercise, youre on the right track. You dont need to become an athlete, but you do need a certain amount of brisk exercise to help strengthen your heart. If you have been diagnosed with a heart condition, your doctor may recommend exercise to help stabilize your condition. To help make exercise a habit, choose safe, fun activities.     Exercise with a friend. When activity is fun, you're more likely to stick with it.     Be sure to check with your healthcare provider before starting an exercise program.   Why exercise?  Exercising regularly offers many healthy rewards. It can help you do all of the following:  · Improve your blood cholesterol level to help prevent further heart trouble  · Lower your blood pressure to help prevent a stroke or heart attack  · Control diabetes, or reduce your risk of getting this disease  · Improve your heart and lung function  · Reach and maintain a healthy weight  · Make your muscles stronger and more limber so you can stay active  · Prevent falls and fractures by slowing the loss of bone mass (osteoporosis)  · Manage stress better  · Reduce your blood pressure  · Improve your sense of self and your body image  Exercise tips  Ease into your routine. Set small goals. Then build on them.  Exercise on most days. Aim for a total of 150 or more minutes of moderate to  vigorous intensity activity each week. Consider 40 minutes, 3 to 4 times a week. For best results, activity should last for 40 minutes on average. It is OK to work up to the 40 minute period over time. Examples of moderate-intensity activity is walking 1 mile in 15 minutes  or 30 to 45 minutes of yard work.  Step up your daily activity level. Along with your exercise program, try being more active throughout the day. Walk instead of drive. Do more household tasks or yard work.  Choose one or more activities you enjoy. Walking is one of the easiest things you can do. You can also try swimming, riding a bike, dancing, or taking an exercise class.  Stop exercising and call your doctor if you:  · Have chest pain or feel dizzy or lightheaded  · Feel burning, tightness, pressure, or heaviness in your chest, neck, shoulders, back, or arms  · Have unusual shortness of breath  · Have increased joint or muscle pain  · Have palpitations or an irregular heartbeat   Date Last Reviewed: 5/1/2016 © 2000-2016 [employer]. [address] [place] [address], [place] [address]. All rights reserved. This information is not intended as a substitute for professional medical care. Always follow your healthcare professional's instructions.        Weight Management: Exercise and Activity  Studies show that people who exercise are the most likely to lose weight and keep it off. Exercise burns calories. It helps build muscle to make your body stronger. Make exercise an important part of your weight-management plan.    Make activity part of your day  You may not think you have the time to exercise. But you can work activity into your daily life--you just need to be committed. Take 10 minutes out of your lunch hour to take a walk. Walk to the [employer] to get your paper instead of having it delivered. Make it a habit to take the stairs instead of the elevator. Park in a far away parking spot instead of the closest. Youll be surprised at how fast these little changes can make a difference.  Some people really cannot walk very far, and tire out quickly with exercise. Instead of becoming discouraged, resolve to do what you can do, and work to make that a regular frequent habit.   The benefits of  exercise  Exercise offers many benefits including:   · Exercise increases your metabolism (the speed at which your body burns calories).  · Regular exercise can increase the amount of muscle in your body. Muscle burns calories faster than fat. The more muscle you have, the more calories you burn.  · Exercise gives you energy and curbs your appetite.  · Exercise decreases stress and helps you sleep better.  Make exercise fun  Exercise can be fun. Choose an activity you enjoy. You may even get a friend to do it with you:  · Take a resistance-training or aerobics class  · Join a team sport  · Take a dance class  · Walk the dog  · Ride a bike  If you have health problems, be sure to ask your healthcare provider before you start an exercise program. Have a  help you develop a plan thats safe for you.   Date Last Reviewed: 2/4/2016 © 2000-2016 [employer]. [address] [place] [address], [place] [address]. All rights reserved. This information is not intended as a substitute for professional medical care. Always follow your healthcare professional's instructions.

## 2017-08-16 NOTE — PROGRESS NOTES
Subjective:    Patient ID:  Moiz Valencia is a 71 y.o. male who presents for Coronary Artery Disease; Congestive Heart Failure; Hyperlipidemia; and Valvular Heart Disease      HPI  DISCUSSED LABS AND GOALS, LFT'S/TFT'S OK, , BG BETTER ? PER DR CARLSON, BREATHING BETTER, TOLERATING ENTRESTO, MIGHT NEED BACK SURGERY, SEE ROS  Past Medical History:   Diagnosis Date    Acute coronary syndrome     CAD (coronary artery disease)     Cardiomyopathy     Carotid artery occlusion     CHF (congestive heart failure)     Diabetes mellitus, type 2     Heart murmur     Hypercholesteremia     Hypertension     Old MI (myocardial infarction)     Pneumonia     PUD (peptic ulcer disease)     Sleep apnea     Valvular regurgitation      Past Surgical History:   Procedure Laterality Date    CARDIAC PACEMAKER PLACEMENT      CARDIAC SURGERY      pt states that he had 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    COLONOSCOPY      CORONARY ANGIOPLASTY      CORONARY ARTERY BYPASS GRAFT      HERNIA REPAIR      INCISIONAL HERNIA REPAIR  06/05/2017    TONSILLECTOMY       Family History   Problem Relation Age of Onset    Diabetes Mother      Social History     Social History    Marital status:      Spouse name: N/A    Number of children: N/A    Years of education: N/A     Social History Main Topics    Smoking status: Former Smoker     Quit date: 6/2/1984    Smokeless tobacco: Current User     Types: Chew    Alcohol use No    Drug use: No    Sexual activity: Not on file     Other Topics Concern    Not on file     Social History Narrative    No narrative on file       Review of patient's allergies indicates:  No Known Allergies    Current Outpatient Prescriptions:     allopurinol (ZYLOPRIM) 300 MG tablet, Take 300 mg by mouth once daily., Disp: , Rfl:     amiodarone (PACERONE) 200 MG Tab, Take 1 tablet (200 mg total) by mouth 2 (two) times daily., Disp: 180 tablet, Rfl: 1    aspirin (ECOTRIN) 325  MG EC tablet, Take 325 mg by mouth once daily., Disp: , Rfl:     atorvastatin (LIPITOR) 80 MG tablet, Take 40 mg by mouth every evening. , Disp: , Rfl:     carvedilol (COREG) 25 MG tablet, Take 1 tablet (25 mg total) by mouth 2 (two) times daily with meals., Disp: 180 tablet, Rfl: 3    ENTRESTO 24-26 mg per tablet, Take one tablet by mouth twice daily., Disp: , Rfl: 3    etodolac (LODINE) 400 MG tablet, Take 400 mg by mouth 2 (two) times daily., Disp: , Rfl:     furosemide (LASIX) 20 MG tablet, Take 20 mg by mouth as needed (fluid). , Disp: , Rfl: 1    gabapentin (NEURONTIN) 100 MG capsule, Take 200 mg by mouth 3 (three) times daily. , Disp: , Rfl:     insulin detemir (LEVEMIR) 100 unit/mL injection, Inject 38 Units into the skin 2 (two) times daily., Disp: , Rfl:     JANUVIA 100 mg Tab, Take 1 tablet by mouth Daily., Disp: , Rfl: 2    magnesium oxide (MAG-OX) 400 mg tablet, Take one tablet daily., Disp: , Rfl: 0    metformin (GLUCOPHAGE) 1000 MG tablet, Take 1,000 mg by mouth 2 (two) times daily with meals., Disp: , Rfl:     paroxetine (PAXIL) 30 MG tablet, Take 30 mg by mouth 2 (two) times daily. , Disp: , Rfl:     SAXagliptin (ONGLYZA) 5 mg Tab tablet, Take 5 mg by mouth once daily., Disp: , Rfl:     Review of Systems   Constitution: Negative for chills, diaphoresis, weakness, malaise/fatigue and night sweats.   HENT: Negative for congestion and nosebleeds. Hearing loss: CHRONIC.    Eyes: Negative for blurred vision, discharge, double vision and visual disturbance.   Cardiovascular: Positive for dyspnea on exertion (MILD/ MOD). Negative for chest pain, claudication, cyanosis, irregular heartbeat, leg swelling, near-syncope, orthopnea (1 PILLOW), palpitations, paroxysmal nocturnal dyspnea and syncope.   Respiratory: Negative for cough, hemoptysis, sleep disturbances due to breathing, sputum production and wheezing. Shortness of breath: BETTER.    Endocrine: Negative for cold intolerance, heat  "intolerance and polyuria.   Hematologic/Lymphatic: Negative for adenopathy. Does not bruise/bleed easily.   Skin: Negative for color change, itching and nail changes.   Musculoskeletal: Positive for back pain (CHRONIC). Negative for falls. Joint pain: R HIP.        LIMITED MOBILITY   Gastrointestinal: Negative for abdominal pain, change in bowel habit, constipation, dysphagia, heartburn, hematemesis, jaundice, melena and vomiting.   Genitourinary: Negative for dysuria, flank pain and frequency. Nocturia: ONCE.   Neurological: Negative for brief paralysis, difficulty with concentration, disturbances in coordination, dizziness, focal weakness, light-headedness, loss of balance, numbness, paresthesias, seizures, sensory change and tremors.   Psychiatric/Behavioral: Negative for altered mental status, depression, memory loss and substance abuse. The patient is not nervous/anxious.    Allergic/Immunologic: Negative for hives and persistent infections.        Objective:      Vitals:    08/16/17 0915   BP: (!) 96/54   Pulse: (!) 51   Weight: 102.3 kg (225 lb 8.5 oz)   Height: 5' 6" (1.676 m)   PainSc:   4   PainLoc: Back     Body mass index is 36.4 kg/m².    Physical Exam   Constitutional: He is oriented to person, place, and time. He appears well-developed and well-nourished. He is active.   OVERWEIGHT   HENT:   Head: Normocephalic and atraumatic.   Mouth/Throat: Oropharynx is clear and moist and mucous membranes are normal.   Eyes: Conjunctivae and EOM are normal. Pupils are equal, round, and reactive to light.   Neck: Normal range of motion. Neck supple. Normal carotid pulses, no hepatojugular reflux and no JVD present. Carotid bruit is not present. No tracheal deviation, no edema and no erythema present. No thyromegaly present.   Cardiovascular: Normal rate and regular rhythm.   No extrasystoles are present. PMI is not displaced.  Exam reveals no gallop, no distant heart sounds, no friction rub and no midsystolic " click.    Murmur heard.  High-pitched blowing holosystolic murmur is present with a grade of 1/6  at the apex   Systolic murmur is also present at the lower right sternal border.  Pulses:       Carotid pulses are 2+ on the right side, and 2+ on the left side.       Radial pulses are 2+ on the right side, and 2+ on the left side.        Femoral pulses are 2+ on the right side, and 2+ on the left side.       Dorsalis pedis pulses are 2+ on the right side, and 2+ on the left side.        Posterior tibial pulses are 2+ on the right side, and 2+ on the left side.   Pulmonary/Chest: Effort normal and breath sounds normal. No accessory muscle usage or stridor. No tachypnea and no bradypnea. No respiratory distress.   STERNOTOMY   Abdominal: Soft. Bowel sounds are normal. He exhibits no distension and no mass. There is no hepatosplenomegaly. There is no tenderness. There is no CVA tenderness.   Musculoskeletal: Normal range of motion. He exhibits no edema or deformity.   LIMITED MOBILITY, SLOW GAIT, CANE   Lymphadenopathy:     He has no cervical adenopathy.   Neurological: He is alert and oriented to person, place, and time. He has normal strength. He displays no tremor. No cranial nerve deficit. He exhibits normal muscle tone. Coordination normal.   Skin: Skin is warm and dry. No cyanosis or erythema. No pallor.   Psychiatric: He has a normal mood and affect. His speech is normal and behavior is normal. Judgment and thought content normal.               ..    Chemistry        Component Value Date/Time     08/02/2017 1303    K 5.1 08/02/2017 1303     08/02/2017 1303    CO2 26 08/02/2017 1303    BUN 20 08/02/2017 1303    CREATININE 0.9 08/02/2017 1303     (H) 08/02/2017 1303        Component Value Date/Time    CALCIUM 9.6 08/02/2017 1303    ALKPHOS 93 08/02/2017 1303    AST 17 08/02/2017 1303    AST 15 (L) 11/10/2015 0819    ALT 20 08/02/2017 1303    BILITOT 0.5 08/02/2017 1303    ESTGFRAFRICA >60.0  08/02/2017 1303    EGFRNONAA >60.0 08/02/2017 1303            ..  Lab Results   Component Value Date    CHOL 192 08/02/2017    CHOL 143 11/10/2015     Lab Results   Component Value Date    HDL 50 08/02/2017    HDL 49 11/10/2015     Lab Results   Component Value Date    LDLCALC 122.0 08/02/2017    LDLCALC 57.0 (L) 11/10/2015     Lab Results   Component Value Date    TRIG 100 08/02/2017    TRIG 185 (H) 11/10/2015     Lab Results   Component Value Date    CHOLHDL 26.0 08/02/2017    CHOLHDL 34.3 11/10/2015     ..  Lab Results   Component Value Date    WBC 5.79 07/27/2017    HGB 13.4 (L) 07/27/2017    HCT 40.6 07/27/2017    MCV 93 07/27/2017     07/27/2017       Test(s) Reviewed  I have reviewed the following in detail:  [] Stress test   [] Angiography   [] Echocardiogram   [] Labs   [x] Other:       Assessment:         ICD-10-CM ICD-9-CM   1. Left heart failure I50.1 428.1   2. Coronary artery disease involving native coronary artery of native heart without angina pectoris I25.10 414.01   3. Long term current use of amiodarone Z79.899 V58.69   4. Hypercholesterolemia E78.00 272.0   5. ICD (implantable cardioverter-defibrillator) in place Z95.810 V45.02     Problem List Items Addressed This Visit        Cardiac/Vascular    CAD (coronary artery disease)    Left heart failure - Primary    Relevant Orders    Comprehensive metabolic panel    Long term current use of amiodarone    Relevant Orders    Comprehensive metabolic panel    TSH    T3    T4    ICD (implantable cardioverter-defibrillator) in place    Hypercholesterolemia    Relevant Orders    Comprehensive metabolic panel      Other Visit Diagnoses    None.          Plan:         COMPLIANCE WITH LIPITOR, LDL WAS 57, DAILY WEIGHT, NOW CLASS 2 HF, ALL OTHER CV CLINICALLY STABLE, NO ANGINA, NO OVERT HF, NO TIA, NO CLINICAL ARRHYTHMIA,CONTINUE CURRENT MEDS, EDUCATION, DIET, EXERCISE, WEIGHT LOSS, RTC IN 6 MO WITH LABS  Left heart failure  -     Comprehensive  metabolic panel; Future; Expected date: 08/16/2017    Coronary artery disease involving native coronary artery of native heart without angina pectoris    Long term current use of amiodarone  -     Comprehensive metabolic panel; Future; Expected date: 08/16/2017  -     TSH; Future; Expected date: 08/16/2017  -     T3; Future; Expected date: 08/16/2017  -     T4; Future; Expected date: 08/16/2017    Hypercholesterolemia  -     Comprehensive metabolic panel; Future; Expected date: 08/16/2017    ICD (implantable cardioverter-defibrillator) in place    Other orders  -     amiodarone (PACERONE) 200 MG Tab; Take 1 tablet (200 mg total) by mouth 2 (two) times daily.  Dispense: 180 tablet; Refill: 1    RTC Low level/low impact aerobic exercise 5x's/wk. Heart healthy diet and risk factor modification.    See labs and med orders.    Aerobic exercise 5x's/wk. Heart healthy diet and risk factor modification.    See labs and med orders.

## 2017-08-28 RX ORDER — SACUBITRIL AND VALSARTAN 24; 26 MG/1; MG/1
TABLET, FILM COATED ORAL
Qty: 60 TABLET | Refills: 1 | Status: SHIPPED | OUTPATIENT
Start: 2017-08-28 | End: 2017-11-29 | Stop reason: ALTCHOICE

## 2017-12-01 DIAGNOSIS — I50.1 LEFT HEART FAILURE: ICD-10-CM

## 2017-12-01 DIAGNOSIS — I25.10 CORONARY ARTERY DISEASE INVOLVING NATIVE CORONARY ARTERY OF NATIVE HEART WITHOUT ANGINA PECTORIS: ICD-10-CM

## 2017-12-01 DIAGNOSIS — Z95.810 ICD (IMPLANTABLE CARDIOVERTER-DEFIBRILLATOR) IN PLACE: Primary | ICD-10-CM

## 2017-12-13 ENCOUNTER — CLINICAL SUPPORT (OUTPATIENT)
Dept: CARDIOLOGY | Facility: CLINIC | Age: 72
End: 2017-12-13
Attending: INTERNAL MEDICINE
Payer: MEDICARE

## 2017-12-13 DIAGNOSIS — I25.10 CORONARY ARTERY DISEASE INVOLVING NATIVE CORONARY ARTERY OF NATIVE HEART WITHOUT ANGINA PECTORIS: ICD-10-CM

## 2017-12-13 DIAGNOSIS — Z95.810 ICD (IMPLANTABLE CARDIOVERTER-DEFIBRILLATOR) IN PLACE: ICD-10-CM

## 2017-12-13 DIAGNOSIS — I50.1 LEFT HEART FAILURE: ICD-10-CM

## 2017-12-13 PROCEDURE — 93282 PRGRMG EVAL IMPLANTABLE DFB: CPT | Mod: PBBFAC,PO | Performed by: INTERNAL MEDICINE

## 2018-02-14 ENCOUNTER — OFFICE VISIT (OUTPATIENT)
Dept: CARDIOLOGY | Facility: CLINIC | Age: 73
End: 2018-02-14
Payer: MEDICARE

## 2018-02-14 VITALS
HEART RATE: 68 BPM | BODY MASS INDEX: 34.9 KG/M2 | WEIGHT: 217.13 LBS | HEIGHT: 66 IN | SYSTOLIC BLOOD PRESSURE: 130 MMHG | OXYGEN SATURATION: 98 % | DIASTOLIC BLOOD PRESSURE: 64 MMHG

## 2018-02-14 DIAGNOSIS — Z79.899 LONG TERM CURRENT USE OF AMIODARONE: ICD-10-CM

## 2018-02-14 DIAGNOSIS — I25.810 CORONARY ARTERY DISEASE INVOLVING CORONARY BYPASS GRAFT OF NATIVE HEART WITHOUT ANGINA PECTORIS: ICD-10-CM

## 2018-02-14 DIAGNOSIS — F17.220 TOBACCO DEPENDENCE DUE TO CHEWING TOBACCO: ICD-10-CM

## 2018-02-14 DIAGNOSIS — I50.1 LEFT HEART FAILURE: Primary | ICD-10-CM

## 2018-02-14 DIAGNOSIS — E78.00 HYPERCHOLESTEROLEMIA: ICD-10-CM

## 2018-02-14 DIAGNOSIS — I10 ESSENTIAL HYPERTENSION: ICD-10-CM

## 2018-02-14 DIAGNOSIS — Z95.810 ICD (IMPLANTABLE CARDIOVERTER-DEFIBRILLATOR) IN PLACE: ICD-10-CM

## 2018-02-14 DIAGNOSIS — I34.0 NON-RHEUMATIC MITRAL REGURGITATION: ICD-10-CM

## 2018-02-14 DIAGNOSIS — E66.9 OBESITY WITH BODY MASS INDEX 30 OR GREATER: ICD-10-CM

## 2018-02-14 PROCEDURE — 99214 OFFICE O/P EST MOD 30 MIN: CPT | Mod: S$GLB,,, | Performed by: INTERNAL MEDICINE

## 2018-02-14 PROCEDURE — 1126F AMNT PAIN NOTED NONE PRSNT: CPT | Mod: S$GLB,,, | Performed by: INTERNAL MEDICINE

## 2018-02-14 PROCEDURE — 1159F MED LIST DOCD IN RCRD: CPT | Mod: S$GLB,,, | Performed by: INTERNAL MEDICINE

## 2018-02-14 RX ORDER — LOSARTAN POTASSIUM 25 MG/1
25 TABLET ORAL DAILY
Qty: 90 TABLET | Refills: 1 | Status: SHIPPED | OUTPATIENT
Start: 2018-02-14 | End: 2018-06-07

## 2018-02-14 NOTE — PROGRESS NOTES
Subjective:    Patient ID:  Moiz Valencia is a 72 y.o. male who presents for Congestive Heart Failure; Valvular Heart Disease; and Coronary Artery Disease        HPI  RECENT BMP CR 0.91, , UA OK, DOING OK,SMAE BACK AND HIP PROBLEMS, NO SURGERY, TO HAVE STIMULATOR,SEE ROS    Past Medical History:   Diagnosis Date    Acute coronary syndrome     CAD (coronary artery disease)     Cardiomyopathy     Carotid artery occlusion     CHF (congestive heart failure)     Diabetes mellitus, type 2     Heart murmur     Hypercholesteremia     Hypertension     Old MI (myocardial infarction)     Pneumonia     PUD (peptic ulcer disease)     Sleep apnea     Valvular regurgitation      Past Surgical History:   Procedure Laterality Date    CARDIAC PACEMAKER PLACEMENT      CARDIAC SURGERY      pt states that he had 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    COLONOSCOPY      CORONARY ANGIOPLASTY      CORONARY ARTERY BYPASS GRAFT      HERNIA REPAIR      INCISIONAL HERNIA REPAIR  06/05/2017    TONSILLECTOMY       Family History   Problem Relation Age of Onset    Diabetes Mother      Social History     Social History    Marital status:      Spouse name: N/A    Number of children: N/A    Years of education: N/A     Social History Main Topics    Smoking status: Former Smoker     Quit date: 6/2/1984    Smokeless tobacco: Current User     Types: Chew    Alcohol use No    Drug use: No    Sexual activity: Not on file     Other Topics Concern    Not on file     Social History Narrative    No narrative on file       Review of patient's allergies indicates:  No Known Allergies    Current Outpatient Prescriptions:     allopurinol (ZYLOPRIM) 300 MG tablet, Take 300 mg by mouth once daily., Disp: , Rfl:     amiodarone (PACERONE) 200 MG Tab, Take 1 tablet (200 mg total) by mouth 2 (two) times daily., Disp: 180 tablet, Rfl: 1    aspirin (ECOTRIN) 325 MG EC tablet, Take 325 mg by mouth once daily.,  Disp: , Rfl:     atorvastatin (LIPITOR) 80 MG tablet, Take 40 mg by mouth every evening. , Disp: , Rfl:     carvedilol (COREG) 25 MG tablet, Take 1 tablet (25 mg total) by mouth 2 (two) times daily with meals., Disp: 180 tablet, Rfl: 3    gabapentin (NEURONTIN) 100 MG capsule, Take 200 mg by mouth 3 (three) times daily. , Disp: , Rfl:     hydrocodone-acetaminophen 7.5-325mg (NORCO) 7.5-325 mg per tablet, Take 1 tablet by mouth every 6 (six) hours as needed for Pain., Disp: , Rfl:     insulin detemir (LEVEMIR) 100 unit/mL injection, Inject 38 Units into the skin 2 (two) times daily., Disp: , Rfl:     magnesium oxide (MAG-OX) 400 mg tablet, Take one tablet daily., Disp: , Rfl: 0    metformin (GLUCOPHAGE) 1000 MG tablet, Take 1,000 mg by mouth 2 (two) times daily with meals., Disp: , Rfl:     pantoprazole (PROTONIX) 40 MG tablet, Take 40 mg by mouth once daily., Disp: , Rfl:     paroxetine (PAXIL) 30 MG tablet, Take 30 mg by mouth 2 (two) times daily. , Disp: , Rfl:     SAXagliptin (ONGLYZA) 5 mg Tab tablet, Take 5 mg by mouth once daily., Disp: , Rfl:     etodolac (LODINE) 400 MG tablet, Take 400 mg by mouth 2 (two) times daily., Disp: , Rfl:     losartan (COZAAR) 25 MG tablet, Take 1 tablet (25 mg total) by mouth once daily., Disp: 90 tablet, Rfl: 1    Review of Systems   Constitution: Negative for chills, diaphoresis, fever, weakness, malaise/fatigue and night sweats. Weight loss: SOME.   HENT: Negative for congestion and nosebleeds. Hearing loss: CHRONIC.    Eyes: Negative for blurred vision, discharge, double vision and visual disturbance.   Cardiovascular: Negative for chest pain, claudication, cyanosis, dyspnea on exertion (MILD/), irregular heartbeat, leg swelling, near-syncope, orthopnea (USES 1 PILLOLW, FLAT), palpitations, paroxysmal nocturnal dyspnea and syncope.   Respiratory: Negative for cough, hemoptysis and wheezing. Shortness of breath: BETTER.    Endocrine: Negative for cold intolerance,  "heat intolerance and polyuria.   Hematologic/Lymphatic: Negative for adenopathy and bleeding problem. Does not bruise/bleed easily.   Skin: Negative for color change, itching and rash.   Musculoskeletal: Positive for back pain (CHRONIC). Negative for falls (NOT LATELY). Joint pain: R HIP.        SLOW GAIT   Gastrointestinal: Negative for abdominal pain, change in bowel habit, constipation, dysphagia, jaundice, melena and vomiting.   Genitourinary: Negative for dysuria, flank pain and frequency.   Neurological: Negative for brief paralysis, dizziness, focal weakness, light-headedness, loss of balance (OCC), numbness and tremors.   Psychiatric/Behavioral: Negative for depression and memory loss. The patient is not nervous/anxious.    Allergic/Immunologic: Negative for environmental allergies, hives and persistent infections.        Objective:      Vitals:    02/14/18 1407   BP: 130/64   Pulse: 68   SpO2: 98%   Weight: 98.5 kg (217 lb 2.5 oz)   Height: 5' 6" (1.676 m)   PainSc: 0-No pain     Body mass index is 35.05 kg/m².    Physical Exam   Constitutional: He is oriented to person, place, and time. He appears well-developed and well-nourished. He is active.   OVERWEIGHT   HENT:   Head: Normocephalic and atraumatic.   Mouth/Throat: Oropharynx is clear and moist and mucous membranes are normal.   Eyes: Conjunctivae and EOM are normal. Pupils are equal, round, and reactive to light.   Neck: Normal range of motion. Neck supple. Normal carotid pulses, no hepatojugular reflux and no JVD present. Carotid bruit is not present. No tracheal deviation, no edema and no erythema present. No thyromegaly present.   Cardiovascular: Normal rate and regular rhythm.   No extrasystoles are present. PMI is not displaced.  Exam reveals no gallop, no distant heart sounds, no friction rub and no midsystolic click.    Murmur heard.  High-pitched holosystolic murmur is present with a grade of 1/6  at the apex   Systolic murmur is also present " at the lower right sternal border.  Pulses:       Carotid pulses are 2+ on the right side, and 2+ on the left side.       Radial pulses are 2+ on the right side, and 2+ on the left side.        Femoral pulses are 2+ on the right side, and 2+ on the left side.       Posterior tibial pulses are 2+ on the right side, and 2+ on the left side.   Pulmonary/Chest: Effort normal and breath sounds normal. No accessory muscle usage or stridor. No tachypnea and no bradypnea. No respiratory distress.   STERNOTOMY   Abdominal: Soft. Bowel sounds are normal. He exhibits no distension and no mass. There is no hepatosplenomegaly. There is no tenderness. There is no CVA tenderness.   Musculoskeletal: Normal range of motion. He exhibits no edema or deformity.   VERY SLOW GAIT, USES A CANE   Lymphadenopathy:     He has no cervical adenopathy.   Neurological: He is alert and oriented to person, place, and time. He has normal strength. He displays no tremor. No cranial nerve deficit. He exhibits normal muscle tone. Coordination normal.   Skin: Skin is warm and dry. No cyanosis or erythema. No pallor.   Psychiatric: He has a normal mood and affect. His speech is normal and behavior is normal. Judgment and thought content normal.               ..    Chemistry        Component Value Date/Time     12/13/2017 0957    K 4.9 12/13/2017 0957    CL 98 12/13/2017 0957    CO2 27 12/13/2017 0957    BUN 21 12/13/2017 0957    CREATININE 0.91 12/13/2017 0957     (H) 12/13/2017 0957        Component Value Date/Time    CALCIUM 9.7 12/13/2017 0957    ALKPHOS 93 08/02/2017 1303    AST 17 08/02/2017 1303    AST 15 (L) 11/10/2015 0819    ALT 20 08/02/2017 1303    BILITOT 0.5 08/02/2017 1303    ESTGFRAFRICA >60 12/13/2017 0957    EGFRNONAA >60 12/13/2017 0957            ..  Lab Results   Component Value Date    CHOL 192 08/02/2017    CHOL 143 11/10/2015     Lab Results   Component Value Date    HDL 50 08/02/2017    HDL 49 11/10/2015     Lab  Results   Component Value Date    LDLCALC 122.0 08/02/2017    LDLCALC 57.0 (L) 11/10/2015     Lab Results   Component Value Date    TRIG 100 08/02/2017    TRIG 185 (H) 11/10/2015     Lab Results   Component Value Date    CHOLHDL 26.0 08/02/2017    CHOLHDL 34.3 11/10/2015     ..  Lab Results   Component Value Date    WBC 5.79 07/27/2017    HGB 13.4 (L) 07/27/2017    HCT 40.6 07/27/2017    MCV 93 07/27/2017     07/27/2017       Test(s) Reviewed  I have reviewed the following in detail:  [] Stress test   [] Angiography   [] Echocardiogram   [x] Labs   [x] Other:       Assessment:         ICD-10-CM ICD-9-CM   1. Left heart failure I50.1 428.1   2. Long term current use of amiodarone Z79.899 V58.69   3. Coronary artery disease involving coronary bypass graft of native heart without angina pectoris I25.810 414.05   4. Essential hypertension I10 401.9   5. Hypercholesterolemia E78.00 272.0   6. ICD (implantable cardioverter-defibrillator) in place Z95.810 V45.02   7. Obesity with body mass index 30 or greater E66.9 278.00   8. Non-rheumatic mitral regurgitation I34.0 424.0   9. Tobacco dependence due to chewing tobacco F17.220 305.1     Problem List Items Addressed This Visit        Cardiac/Vascular    CAD (coronary artery disease)    Relevant Orders    Comprehensive metabolic panel    Lipid panel    Hypertension    Relevant Orders    Comprehensive metabolic panel    Left heart failure - Primary    Relevant Orders    Comprehensive metabolic panel    Long term current use of amiodarone    Relevant Orders    Comprehensive metabolic panel    ICD (implantable cardioverter-defibrillator) in place    Hypercholesterolemia    Relevant Orders    Comprehensive metabolic panel    Lipid panel    Non-rheumatic mitral regurgitation       Endocrine    Obesity with body mass index 30 or greater       Other    Tobacco dependence due to chewing tobacco           Plan:         DAILY LOSARTAN,  DAILY WEIGHT, ALL CV CLINICALLY  RELATIVELY STABLE, NO ANGINA, CLASS 2 HF, NO TIA, NO CLINICAL ARRHYTHMIA,CONTINUE CURRENT MEDS, EDUCATION, DIET, EXERCISE, WEIGHT LOSS, LABS SOON, TOBACCO CESSATION, RTC IN 6 MO  Left heart failure  -     Comprehensive metabolic panel; Future; Expected date: 02/14/2018    Long term current use of amiodarone  -     Comprehensive metabolic panel; Future; Expected date: 02/14/2018    Coronary artery disease involving coronary bypass graft of native heart without angina pectoris  -     Comprehensive metabolic panel; Future; Expected date: 02/14/2018  -     Lipid panel; Future; Expected date: 02/14/2018    Essential hypertension  -     Comprehensive metabolic panel; Future; Expected date: 02/14/2018    Hypercholesterolemia  -     Comprehensive metabolic panel; Future; Expected date: 02/14/2018  -     Lipid panel; Future; Expected date: 02/14/2018    ICD (implantable cardioverter-defibrillator) in place    Obesity with body mass index 30 or greater    Non-rheumatic mitral regurgitation    Tobacco dependence due to chewing tobacco    Other orders  -     losartan (COZAAR) 25 MG tablet; Take 1 tablet (25 mg total) by mouth once daily.  Dispense: 90 tablet; Refill: 1    RTC Low level/low impact aerobic exercise 5x's/wk. Heart healthy diet and risk factor modification.    See labs and med orders.    Aerobic exercise 5x's/wk. Heart healthy diet and risk factor modification.    See labs and med orders.

## 2018-02-19 ENCOUNTER — TELEPHONE (OUTPATIENT)
Dept: CARDIOLOGY | Facility: CLINIC | Age: 73
End: 2018-02-19

## 2018-02-19 NOTE — TELEPHONE ENCOUNTER
----- Message from Janet Kinney sent at 2/19/2018  9:29 AM CST -----  Contact: [facility]  [facility] calling regarding needs pt lab orders faxed them at [phone]....[phone]

## 2018-02-23 ENCOUNTER — TELEPHONE (OUTPATIENT)
Dept: CARDIOLOGY | Facility: CLINIC | Age: 73
End: 2018-02-23

## 2018-02-23 NOTE — TELEPHONE ENCOUNTER
----- Message from Dee Valdez sent at 2/23/2018 12:02 PM CST -----  Contact: asiya w/ [facility]  Asiya collier/ [facility] calling to request lab orders for the patient to have blood work drawn. Please advise.  Call back     Fax   Thanks!

## 2018-06-07 ENCOUNTER — LAB VISIT (OUTPATIENT)
Dept: LAB | Facility: HOSPITAL | Age: 73
End: 2018-06-07
Attending: INTERNAL MEDICINE
Payer: MEDICARE

## 2018-06-07 ENCOUNTER — OFFICE VISIT (OUTPATIENT)
Dept: CARDIOLOGY | Facility: CLINIC | Age: 73
End: 2018-06-07
Payer: MEDICARE

## 2018-06-07 VITALS
BODY MASS INDEX: 34.15 KG/M2 | HEART RATE: 70 BPM | DIASTOLIC BLOOD PRESSURE: 73 MMHG | WEIGHT: 212.5 LBS | SYSTOLIC BLOOD PRESSURE: 128 MMHG | HEIGHT: 66 IN

## 2018-06-07 DIAGNOSIS — Z01.810 ENCOUNTER FOR PRE-OPERATIVE CARDIOVASCULAR CLEARANCE: ICD-10-CM

## 2018-06-07 DIAGNOSIS — I34.0 NON-RHEUMATIC MITRAL REGURGITATION: ICD-10-CM

## 2018-06-07 DIAGNOSIS — I25.10 CORONARY ARTERY DISEASE INVOLVING NATIVE CORONARY ARTERY OF NATIVE HEART WITHOUT ANGINA PECTORIS: ICD-10-CM

## 2018-06-07 DIAGNOSIS — I50.1 LEFT HEART FAILURE: ICD-10-CM

## 2018-06-07 DIAGNOSIS — Z79.899 LONG TERM CURRENT USE OF AMIODARONE: ICD-10-CM

## 2018-06-07 DIAGNOSIS — E66.9 OBESITY, CLASS I, BMI 30-34.9: ICD-10-CM

## 2018-06-07 DIAGNOSIS — I50.1 LEFT HEART FAILURE: Primary | ICD-10-CM

## 2018-06-07 PROBLEM — E66.811 OBESITY, CLASS I, BMI 30-34.9: Status: ACTIVE | Noted: 2018-06-07

## 2018-06-07 LAB
ALBUMIN SERPL BCP-MCNC: 3.8 G/DL
ALP SERPL-CCNC: 105 U/L
ALT SERPL W/O P-5'-P-CCNC: 17 U/L
ANION GAP SERPL CALC-SCNC: 12 MMOL/L
AST SERPL-CCNC: 15 U/L
BASOPHILS # BLD AUTO: 0.05 K/UL
BASOPHILS NFR BLD: 0.5 %
BILIRUB SERPL-MCNC: 0.8 MG/DL
BUN SERPL-MCNC: 13 MG/DL
CALCIUM SERPL-MCNC: 9.6 MG/DL
CHLORIDE SERPL-SCNC: 101 MMOL/L
CO2 SERPL-SCNC: 24 MMOL/L
CREAT SERPL-MCNC: 1 MG/DL
DIFFERENTIAL METHOD: ABNORMAL
EOSINOPHIL # BLD AUTO: 0.1 K/UL
EOSINOPHIL NFR BLD: 1 %
ERYTHROCYTE [DISTWIDTH] IN BLOOD BY AUTOMATED COUNT: 12.5 %
EST. GFR  (AFRICAN AMERICAN): >60 ML/MIN/1.73 M^2
EST. GFR  (NON AFRICAN AMERICAN): >60 ML/MIN/1.73 M^2
GLUCOSE SERPL-MCNC: 283 MG/DL
HCT VFR BLD AUTO: 43.4 %
HGB BLD-MCNC: 14.6 G/DL
IMM GRANULOCYTES # BLD AUTO: 0.05 K/UL
IMM GRANULOCYTES NFR BLD AUTO: 0.5 %
LYMPHOCYTES # BLD AUTO: 1.8 K/UL
LYMPHOCYTES NFR BLD: 17.2 %
MCH RBC QN AUTO: 31.8 PG
MCHC RBC AUTO-ENTMCNC: 33.6 G/DL
MCV RBC AUTO: 95 FL
MONOCYTES # BLD AUTO: 0.8 K/UL
MONOCYTES NFR BLD: 7.6 %
NEUTROPHILS # BLD AUTO: 7.6 K/UL
NEUTROPHILS NFR BLD: 73.2 %
NRBC BLD-RTO: 0 /100 WBC
PLATELET # BLD AUTO: 310 K/UL
PMV BLD AUTO: 11.3 FL
POTASSIUM SERPL-SCNC: 4.6 MMOL/L
PROT SERPL-MCNC: 7.3 G/DL
RBC # BLD AUTO: 4.59 M/UL
SODIUM SERPL-SCNC: 137 MMOL/L
T3 SERPL-MCNC: 69 NG/DL
T4 SERPL-MCNC: 8.6 UG/DL
TSH SERPL DL<=0.005 MIU/L-ACNC: 2.28 UIU/ML
WBC # BLD AUTO: 10.41 K/UL

## 2018-06-07 PROCEDURE — 99213 OFFICE O/P EST LOW 20 MIN: CPT | Mod: PBBFAC,PO | Performed by: INTERNAL MEDICINE

## 2018-06-07 PROCEDURE — 99214 OFFICE O/P EST MOD 30 MIN: CPT | Mod: S$PBB,,, | Performed by: INTERNAL MEDICINE

## 2018-06-07 PROCEDURE — 84443 ASSAY THYROID STIM HORMONE: CPT

## 2018-06-07 PROCEDURE — 36415 COLL VENOUS BLD VENIPUNCTURE: CPT | Mod: PO

## 2018-06-07 PROCEDURE — 80053 COMPREHEN METABOLIC PANEL: CPT

## 2018-06-07 PROCEDURE — 84436 ASSAY OF TOTAL THYROXINE: CPT

## 2018-06-07 PROCEDURE — 85025 COMPLETE CBC W/AUTO DIFF WBC: CPT

## 2018-06-07 PROCEDURE — 84480 ASSAY TRIIODOTHYRONINE (T3): CPT

## 2018-06-07 PROCEDURE — 99999 PR PBB SHADOW E&M-EST. PATIENT-LVL III: CPT | Mod: PBBFAC,,, | Performed by: INTERNAL MEDICINE

## 2018-06-07 NOTE — PROGRESS NOTES
Subjective:    Patient ID:  Moiz Valencia is a 72 y.o. male who presents for Follow-up; Coronary Artery Disease (Needs clearance for Rt. hip replacement on 07/05/18); and Congestive Heart Failure        HPI  No labs, referred for pre-op before R TOTAL HIP, DR LORENZ AT [facility], HAS BEEN DOING OK CARDIAC HOUSE, BREATHING BETTER ON ENTRESTO, SEE ROS    Past Medical History:   Diagnosis Date    Acute coronary syndrome     CAD (coronary artery disease)     Cardiomyopathy     Carotid artery occlusion     CHF (congestive heart failure)     Diabetes mellitus, type 2     Heart murmur     Hypercholesteremia     Hypertension     Old MI (myocardial infarction)     Pneumonia     PUD (peptic ulcer disease)     Sleep apnea     Valvular regurgitation      Past Surgical History:   Procedure Laterality Date    CARDIAC PACEMAKER PLACEMENT      CARDIAC SURGERY      pt states that he had 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    COLONOSCOPY      CORONARY ANGIOPLASTY      CORONARY ARTERY BYPASS GRAFT      HERNIA REPAIR      INCISIONAL HERNIA REPAIR  06/05/2017    TONSILLECTOMY       Family History   Problem Relation Age of Onset    Diabetes Mother      Social History     Social History    Marital status:      Spouse name: N/A    Number of children: N/A    Years of education: N/A     Social History Main Topics    Smoking status: Former Smoker     Quit date: 6/2/1984    Smokeless tobacco: Current User     Types: Chew    Alcohol use No    Drug use: No    Sexual activity: Not on file     Other Topics Concern    Not on file     Social History Narrative    No narrative on file       Review of patient's allergies indicates:  No Known Allergies    Current Outpatient Prescriptions:     allopurinol (ZYLOPRIM) 300 MG tablet, Take 300 mg by mouth once daily., Disp: , Rfl:     amiodarone (PACERONE) 200 MG Tab, Take 1 tablet (200 mg total) by mouth 2 (two) times daily., Disp: 180 tablet, Rfl: 1     aspirin (ECOTRIN) 81 MG EC tablet, Take 81 mg by mouth once daily. , Disp: , Rfl:     atorvastatin (LIPITOR) 80 MG tablet, Take 40 mg by mouth every evening. , Disp: , Rfl:     carvedilol (COREG) 25 MG tablet, Take 1 tablet (25 mg total) by mouth 2 (two) times daily with meals., Disp: 180 tablet, Rfl: 3    gabapentin (NEURONTIN) 100 MG capsule, Take 200 mg by mouth 3 (three) times daily. , Disp: , Rfl:     hydrocodone-acetaminophen 7.5-325mg (NORCO) 7.5-325 mg per tablet, Take 1 tablet by mouth every 6 (six) hours as needed for Pain., Disp: , Rfl:     insulin detemir (LEVEMIR) 100 unit/mL injection, Inject 38 Units into the skin 2 (two) times daily., Disp: , Rfl:     metformin (GLUCOPHAGE) 1000 MG tablet, Take 1,000 mg by mouth 2 (two) times daily with meals., Disp: , Rfl:     paroxetine (PAXIL) 30 MG tablet, Take 30 mg by mouth 2 (two) times daily. , Disp: , Rfl:     sacubitril-valsartan (ENTRESTO) 24-26 mg per tablet, Take 1 tablet by mouth once daily., Disp: , Rfl:     SAXagliptin (ONGLYZA) 5 mg Tab tablet, Take 5 mg by mouth once daily., Disp: , Rfl:     etodolac (LODINE) 400 MG tablet, Take 400 mg by mouth 2 (two) times daily., Disp: , Rfl:     magnesium oxide (MAG-OX) 400 mg tablet, Take one tablet daily., Disp: , Rfl: 0    pantoprazole (PROTONIX) 40 MG tablet, Take 40 mg by mouth once daily., Disp: , Rfl:     Review of Systems   Constitution: Negative for chills, diaphoresis, fever, weakness and malaise/fatigue. Weight loss: \FEW POUNDS.   HENT: Negative for congestion and nosebleeds. Hearing loss: CHRONIC.    Eyes: Negative for blurred vision and visual disturbance.   Cardiovascular: Negative for chest pain, claudication, cyanosis, dyspnea on exertion (MILD/), irregular heartbeat, leg swelling, near-syncope, orthopnea, palpitations, paroxysmal nocturnal dyspnea and syncope.   Respiratory: Negative for cough, hemoptysis and shortness of breath.    Endocrine: Negative for cold intolerance, heat  "intolerance and polyuria.   Hematologic/Lymphatic: Negative for adenopathy and bleeding problem. Does not bruise/bleed easily.   Skin: Negative for itching and rash.   Musculoskeletal: Positive for joint pain (R HIP). Negative for back pain (CHRONIC).        SLOW GAIT   Gastrointestinal: Negative for abdominal pain, change in bowel habit, dysphagia, jaundice, melena and vomiting.   Genitourinary: Negative for dysuria, flank pain and frequency.   Neurological: Negative for brief paralysis, dizziness, focal weakness, light-headedness, loss of balance (USES CRUTCHES), numbness and tremors.   Psychiatric/Behavioral: Negative for depression and memory loss. The patient is not nervous/anxious.    Allergic/Immunologic: Negative for environmental allergies and persistent infections.        Objective:      Vitals:    06/07/18 1442   BP: 128/73   Pulse: 70   Weight: 96.4 kg (212 lb 8.4 oz)   Height: 5' 6" (1.676 m)   PainSc: 10-Worst pain ever     Body mass index is 34.3 kg/m².    Physical Exam   Constitutional: He is oriented to person, place, and time. He appears well-developed and well-nourished. He is active.   OVERWEIGHT   HENT:   Head: Normocephalic and atraumatic.   Mouth/Throat: Oropharynx is clear and moist and mucous membranes are normal.   Eyes: Conjunctivae and EOM are normal. Pupils are equal, round, and reactive to light.   Neck: Normal range of motion. Neck supple. Normal carotid pulses, no hepatojugular reflux and no JVD present. Carotid bruit is not present. No tracheal deviation, no edema and no erythema present. No thyromegaly present.   Cardiovascular: Normal rate and regular rhythm.   No extrasystoles are present. PMI is not displaced.  Exam reveals no gallop, no distant heart sounds, no friction rub and no midsystolic click.    Murmur heard.  High-pitched holosystolic murmur is present with a grade of 1/6  at the apex   Systolic murmur is also present at the lower right sternal border.  Pulses:       " Carotid pulses are 2+ on the right side, and 2+ on the left side.       Radial pulses are 2+ on the right side, and 2+ on the left side.        Femoral pulses are 2+ on the right side, and 2+ on the left side.       Posterior tibial pulses are 2+ on the right side, and 2+ on the left side.   Pulmonary/Chest: Effort normal and breath sounds normal. No accessory muscle usage or stridor. No tachypnea and no bradypnea. No respiratory distress.   STERNOTOMY   Abdominal: Soft. Bowel sounds are normal. He exhibits no distension and no mass. There is no hepatosplenomegaly. There is no tenderness. There is no CVA tenderness.   Musculoskeletal: Normal range of motion. He exhibits no edema or deformity.   SLOW GAIT, USES  CRUTCHES   Lymphadenopathy:     He has no cervical adenopathy.   Neurological: He is alert and oriented to person, place, and time. He has normal strength. He displays no tremor. No cranial nerve deficit.   Skin: Skin is warm and dry. No cyanosis or erythema. No pallor.   Psychiatric: He has a normal mood and affect. His speech is normal and behavior is normal.               ..    Chemistry        Component Value Date/Time     12/13/2017 0957    K 4.9 12/13/2017 0957    CL 98 12/13/2017 0957    CO2 27 12/13/2017 0957    BUN 21 12/13/2017 0957    CREATININE 0.91 12/13/2017 0957     (H) 12/13/2017 0957        Component Value Date/Time    CALCIUM 9.7 12/13/2017 0957    ALKPHOS 93 08/02/2017 1303    AST 17 08/02/2017 1303    AST 15 (L) 11/10/2015 0819    ALT 20 08/02/2017 1303    BILITOT 0.5 08/02/2017 1303    ESTGFRAFRICA >60 12/13/2017 0957    EGFRNONAA >60 12/13/2017 0957            ..  Lab Results   Component Value Date    CHOL 192 08/02/2017    CHOL 143 11/10/2015     Lab Results   Component Value Date    HDL 50 08/02/2017    HDL 49 11/10/2015     Lab Results   Component Value Date    LDLCALC 122.0 08/02/2017    LDLCALC 57.0 (L) 11/10/2015     Lab Results   Component Value Date    TRIG 100  08/02/2017    TRIG 185 (H) 11/10/2015     Lab Results   Component Value Date    CHOLHDL 26.0 08/02/2017    CHOLHDL 34.3 11/10/2015     ..  Lab Results   Component Value Date    WBC 5.79 07/27/2017    HGB 13.4 (L) 07/27/2017    HCT 40.6 07/27/2017    MCV 93 07/27/2017     07/27/2017       Test(s) Reviewed  I have reviewed the following in detail:  [] Stress test   [] Angiography   [] Echocardiogram   [] Labs   [x] Other:       Assessment:         ICD-10-CM ICD-9-CM   1. Left heart failure I50.1 428.1   2. Encounter for pre-operative cardiovascular clearance Z01.810 V72.81   3. Long term current use of amiodarone Z79.899 V58.69   4. Non-rheumatic mitral regurgitation I34.0 424.0   5. Coronary artery disease involving native coronary artery of native heart without angina pectoris I25.10 414.01     Problem List Items Addressed This Visit        Cardiac/Vascular    Coronary artery disease involving native coronary artery of native heart without angina pectoris    Relevant Orders    Comprehensive metabolic panel    CBC auto differential    Left heart failure - Primary    Relevant Orders    Comprehensive metabolic panel    Transthoracic echo (TTE) complete    CBC auto differential    Long term current use of amiodarone    Relevant Orders    Comprehensive metabolic panel    TSH    T3    T4    CBC auto differential    Encounter for pre-operative cardiovascular clearance    Relevant Orders    Comprehensive metabolic panel    CBC auto differential    Non-rheumatic mitral regurgitation    Relevant Orders    Comprehensive metabolic panel    Transthoracic echo (TTE) complete    CBC auto differential           Plan:     EKG SR, LVH, POOR R PROGRESSION, CHECK LABS SOON, CLASS 2 HF , NOW DOING BETTER WITH MEDS, ACCEPTABLE RISK/ MOD FOR HIP REPLACEMENT, DVT PROPHYLAXIS, AVOID HYPOTENSION, DAILY WEIGHT, DIET, ALL CV CLINICALLY STABLE, NO ANGINA, STABLE CLASS 2 HF, NO TIA, NO CLINICAL ARRHYTHMIA,CONTINUE CURRENT MEDS, EDUCATION,  DIET, EXERCISE, WEIGHT LOSS, ECHO RE-ASSESS EF SOON      Left heart failure  -     Comprehensive metabolic panel; Future; Expected date: 06/07/2018  -     Transthoracic echo (TTE) complete; Future  -     CBC auto differential; Future; Expected date: 06/07/2018    Encounter for pre-operative cardiovascular clearance  -     Comprehensive metabolic panel; Future; Expected date: 06/07/2018  -     CBC auto differential; Future; Expected date: 06/07/2018    Long term current use of amiodarone  -     Comprehensive metabolic panel; Future; Expected date: 06/07/2018  -     TSH; Future; Expected date: 06/07/2018  -     T3; Future; Expected date: 06/07/2018  -     T4; Future; Expected date: 06/07/2018  -     CBC auto differential; Future; Expected date: 06/07/2018    Non-rheumatic mitral regurgitation  -     Comprehensive metabolic panel; Future; Expected date: 06/07/2018  -     Transthoracic echo (TTE) complete; Future  -     CBC auto differential; Future; Expected date: 06/07/2018    Coronary artery disease involving native coronary artery of native heart without angina pectoris  -     Comprehensive metabolic panel; Future; Expected date: 06/07/2018  -     CBC auto differential; Future; Expected date: 06/07/2018    RTC Low level/low impact aerobic exercise 5x's/wk. Heart healthy diet and risk factor modification.    See labs and med orders.    Aerobic exercise 5x's/wk. Heart healthy diet and risk factor modification.    See labs and med orders.

## 2018-06-08 ENCOUNTER — TELEPHONE (OUTPATIENT)
Dept: CARDIOLOGY | Facility: CLINIC | Age: 73
End: 2018-06-08

## 2018-06-13 ENCOUNTER — CLINICAL SUPPORT (OUTPATIENT)
Dept: CARDIOLOGY | Facility: CLINIC | Age: 73
End: 2018-06-13
Attending: INTERNAL MEDICINE
Payer: MEDICARE

## 2018-06-13 DIAGNOSIS — Z95.810 ICD (IMPLANTABLE CARDIOVERTER-DEFIBRILLATOR) IN PLACE: ICD-10-CM

## 2018-06-13 DIAGNOSIS — Z95.810 ICD (IMPLANTABLE CARDIOVERTER-DEFIBRILLATOR) IN PLACE: Primary | ICD-10-CM

## 2018-06-13 DIAGNOSIS — I50.1 LEFT HEART FAILURE: ICD-10-CM

## 2018-06-13 PROCEDURE — 93296 REM INTERROG EVL PM/IDS: CPT | Mod: PBBFAC,PO | Performed by: INTERNAL MEDICINE

## 2018-06-13 PROCEDURE — 93295 DEV INTERROG REMOTE 1/2/MLT: CPT | Mod: ,,, | Performed by: INTERNAL MEDICINE

## 2018-06-14 LAB
BATTERY VOLTAGE (V): 3.01 V
HV IMPEDANCE (OHM): 78 OHM
IMPEDANCE RA LEAD: 1140 OHMS
OHS CV DC PP MS1: 1 MS
OHS CV DC PP V1: 5 V
P/R-WAVE RA LEAD: 10.5 MV

## 2018-06-20 ENCOUNTER — TELEPHONE (OUTPATIENT)
Dept: CARDIOLOGY | Facility: CLINIC | Age: 73
End: 2018-06-20

## 2018-06-20 DIAGNOSIS — I50.1 LEFT HEART FAILURE: Primary | ICD-10-CM

## 2018-06-20 NOTE — TELEPHONE ENCOUNTER
----- Message from Gloria Tesfaye sent at 6/20/2018  2:44 PM CDT -----  Contact: Esther Valencia (Spouse) [phone]  Esther Valencia (Spouse) called to reschedule test, please call [phone]

## 2018-06-22 ENCOUNTER — TELEPHONE (OUTPATIENT)
Dept: CARDIOLOGY | Facility: CLINIC | Age: 73
End: 2018-06-22

## 2018-06-22 NOTE — TELEPHONE ENCOUNTER
----- Message from Gi Poe sent at 6/22/2018 12:16 PM CDT -----  Type: Needs Medical Advice    Who Called:  Michelle Perkins Symptoms (please be specific):    How long has patient had these symptoms:    Pharmacy name and phone #:    Best Call Back Number: [phone]  Additional Information: The office need recent labs for cbc, cmp, ptinr, ptt for the above listed patient. Please fax to [phone]

## 2018-09-17 ENCOUNTER — CLINICAL SUPPORT (OUTPATIENT)
Dept: CARDIOLOGY | Facility: CLINIC | Age: 73
End: 2018-09-17
Attending: INTERNAL MEDICINE
Payer: MEDICARE

## 2018-09-17 DIAGNOSIS — I50.1 LEFT HEART FAILURE: ICD-10-CM

## 2018-09-17 DIAGNOSIS — Z95.810 ICD (IMPLANTABLE CARDIOVERTER-DEFIBRILLATOR) IN PLACE: ICD-10-CM

## 2018-09-17 PROCEDURE — 93296 REM INTERROG EVL PM/IDS: CPT | Mod: PBBFAC,PO | Performed by: INTERNAL MEDICINE

## 2018-09-17 PROCEDURE — 93295 DEV INTERROG REMOTE 1/2/MLT: CPT | Mod: ,,, | Performed by: INTERNAL MEDICINE

## 2018-09-27 LAB
BATTERY VOLTAGE (V): 2.93 V
HV IMPEDANCE (OHM): 77 OHM
IMPEDANCE RA LEAD: 1159 OHMS
P/R-WAVE RA LEAD: 10 MV
THRESHOLD MS RA LEAD: 0.4 MS
THRESHOLD V RA LEAD: 2.5 V

## 2018-10-01 ENCOUNTER — CLINICAL SUPPORT (OUTPATIENT)
Dept: CARDIOLOGY | Facility: CLINIC | Age: 73
End: 2018-10-01
Attending: INTERNAL MEDICINE
Payer: MEDICARE

## 2018-10-01 VITALS
SYSTOLIC BLOOD PRESSURE: 140 MMHG | BODY MASS INDEX: 34.07 KG/M2 | DIASTOLIC BLOOD PRESSURE: 92 MMHG | HEART RATE: 65 BPM | WEIGHT: 212 LBS | HEIGHT: 66 IN

## 2018-10-01 DIAGNOSIS — I50.1 LEFT HEART FAILURE: ICD-10-CM

## 2018-10-01 LAB
ASCENDING AORTA: 2.98 CM
AV MEAN GRADIENT: 3.65 MMHG
AV PEAK GRADIENT: 6.18 MMHG
AV VALVE AREA: 2.25 CM2
BSA FOR ECHO PROCEDURE: 2.12 M2
CV ECHO LV RWT: 0.42 CM
DOP CALC AO PEAK VEL: 1.24 M/S
DOP CALC AO VTI: 23.21 CM
DOP CALC LVOT AREA: 3.23 CM2
DOP CALC LVOT DIAMETER: 2.03 CM
DOP CALC LVOT STROKE VOLUME: 52.31 CM3
DOP CALCLVOT PEAK VEL VTI: 16.17 CM
E WAVE DECELERATION TIME: 262.2 MSEC
E/A RATIO: 0.47
E/E' RATIO: 5.5
ECHO LV POSTERIOR WALL: 1.15 CM (ref 0.6–1.1)
FRACTIONAL SHORTENING: 19 % (ref 28–44)
INTERVENTRICULAR SEPTUM: 1.19 CM (ref 0.6–1.1)
IVRT: 0.14 MSEC
LA MAJOR: 6.03 CM
LA MINOR: 5.87 CM
LA WIDTH: 3.26 CM
LEFT ATRIUM SIZE: 4.46 CM
LEFT ATRIUM VOLUME INDEX: 34.7 ML/M2
LEFT ATRIUM VOLUME: 73.52 CM3
LEFT INTERNAL DIMENSION IN SYSTOLE: 4.5 CM (ref 2.1–4)
LEFT VENTRICLE DIASTOLIC VOLUME INDEX: 71.42 ML/M2
LEFT VENTRICLE DIASTOLIC VOLUME: 151.4 ML
LEFT VENTRICLE MASS INDEX: 126.3 G/M2
LEFT VENTRICLE SYSTOLIC VOLUME INDEX: 43.6 ML/M2
LEFT VENTRICLE SYSTOLIC VOLUME: 92.52 ML
LEFT VENTRICULAR INTERNAL DIMENSION IN DIASTOLE: 5.56 CM (ref 3.5–6)
LEFT VENTRICULAR MASS: 267.8 G
LV LATERAL E/E' RATIO: 3.67
LV SEPTAL E/E' RATIO: 11
MV PEAK A VEL: 0.94 M/S
MV PEAK E VEL: 0.44 M/S
MV STENOSIS PRESSURE HALF TIME: 76.04 MS
MV VALVE AREA P 1/2 METHOD: 2.89 CM2
PISA TR MAX VEL: 2.14 M/S
PULM VEIN S/D RATIO: 1.89
PV PEAK D VEL: 0.38 M/S
PV PEAK S VEL: 0.72 M/S
RA MAJOR: 5.07 CM
RA PRESSURE: 3 MMHG
RA WIDTH: 2.69 CM
RETIRED EF AND QEF - SEE NOTES: 38.89 %
SINUS: 3.45 CM
STJ: 2.96 CM
TDI LATERAL: 0.12
TDI SEPTAL: 0.04
TDI: 0.08
TR MAX PG: 18.32 MMHG
TRICUSPID ANNULAR PLANE SYSTOLIC EXCURSION: 0.02 CM
TV REST PULMONARY ARTERY PRESSURE: 21.32 MMHG

## 2018-10-01 PROCEDURE — 93306 TTE W/DOPPLER COMPLETE: CPT | Mod: PBBFAC,PO | Performed by: INTERNAL MEDICINE

## 2018-10-01 PROCEDURE — 99999 PR PBB SHADOW E&M-EST. PATIENT-LVL II: CPT | Mod: PBBFAC,,,

## 2018-10-01 PROCEDURE — 99212 OFFICE O/P EST SF 10 MIN: CPT | Mod: PBBFAC,PO,25

## 2018-10-08 ENCOUNTER — OFFICE VISIT (OUTPATIENT)
Dept: CARDIOLOGY | Facility: CLINIC | Age: 73
End: 2018-10-08
Payer: MEDICARE

## 2018-10-08 VITALS
DIASTOLIC BLOOD PRESSURE: 66 MMHG | SYSTOLIC BLOOD PRESSURE: 122 MMHG | WEIGHT: 209.44 LBS | HEIGHT: 66 IN | BODY MASS INDEX: 33.66 KG/M2 | HEART RATE: 77 BPM

## 2018-10-08 DIAGNOSIS — E78.00 HYPERCHOLESTEROLEMIA: ICD-10-CM

## 2018-10-08 DIAGNOSIS — I25.10 CORONARY ARTERY DISEASE INVOLVING NATIVE CORONARY ARTERY OF NATIVE HEART WITHOUT ANGINA PECTORIS: ICD-10-CM

## 2018-10-08 DIAGNOSIS — I34.0 NON-RHEUMATIC MITRAL REGURGITATION: ICD-10-CM

## 2018-10-08 DIAGNOSIS — Z79.899 LONG TERM CURRENT USE OF AMIODARONE: ICD-10-CM

## 2018-10-08 DIAGNOSIS — E66.9 OBESITY, CLASS I, BMI 30-34.9: ICD-10-CM

## 2018-10-08 DIAGNOSIS — Z01.810 ENCOUNTER FOR PRE-OPERATIVE CARDIOVASCULAR CLEARANCE: ICD-10-CM

## 2018-10-08 DIAGNOSIS — I50.1 LEFT HEART FAILURE: Primary | ICD-10-CM

## 2018-10-08 PROCEDURE — 99999 PR PBB SHADOW E&M-EST. PATIENT-LVL III: CPT | Mod: PBBFAC,,, | Performed by: INTERNAL MEDICINE

## 2018-10-08 PROCEDURE — 99213 OFFICE O/P EST LOW 20 MIN: CPT | Mod: PBBFAC,PO,25 | Performed by: INTERNAL MEDICINE

## 2018-10-08 PROCEDURE — 99214 OFFICE O/P EST MOD 30 MIN: CPT | Mod: S$PBB,,, | Performed by: INTERNAL MEDICINE

## 2018-10-08 PROCEDURE — 93010 ELECTROCARDIOGRAM REPORT: CPT | Mod: S$PBB,,, | Performed by: INTERNAL MEDICINE

## 2018-10-08 PROCEDURE — 93005 ELECTROCARDIOGRAM TRACING: CPT | Mod: PBBFAC,PO | Performed by: INTERNAL MEDICINE

## 2018-10-08 NOTE — PROGRESS NOTES
Subjective:    Patient ID:  Moiz Valencia is a 73 y.o. male who presents for Congestive Heart Failure (Clearance for Lt. Hip total  on 18); Coronary Artery Disease (Echo); and Hypertension        HPI    DISCUSSED ECHO, EF UP 30-35 %, WITH MEDS, HAD R HIP REPLACEMENT DID WELL, NOW NEEDS L HIP REPLACEMENT, QUIT TOBACCO, SEE ROS  Past Medical History:   Diagnosis Date    Acute coronary syndrome     CAD (coronary artery disease)     Cardiomyopathy     Carotid artery occlusion     CHF (congestive heart failure)     Diabetes mellitus, type 2     Heart murmur     Hypercholesteremia     Hypertension     Old MI (myocardial infarction)     Pneumonia     PUD (peptic ulcer disease)     Sleep apnea     Valvular regurgitation      Past Surgical History:   Procedure Laterality Date    CARDIAC PACEMAKER PLACEMENT      CARDIAC SURGERY      pt states that he had 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    COLONOSCOPY      CORONARY ANGIOPLASTY      CORONARY ARTERY BYPASS GRAFT      HERNIA REPAIR      INCISIONAL HERNIA REPAIR  2017    left ventricular  aneurysm repair       REPAIR-HERNIA-UMBILICAL-LAPAROSCOPIC WITH MESH N/A 2017    [facility]    TONSILLECTOMY       Family History   Problem Relation Age of Onset    Diabetes Mother      Social History     Socioeconomic History    Marital status:      Spouse name: Not on file    Number of children: Not on file    Years of education: Not on file    Highest education level: Not on file   Social Needs    Financial resource strain: Not on file    Food insecurity - worry: Not on file    Food insecurity - inability: Not on file    Transportation needs - medical: Not on file    Transportation needs - non-medical: Not on file   Occupational History    Not on file   Tobacco Use    Smoking status: Former Smoker     Last attempt to quit: 1984     Years since quittin.3    Smokeless  tobacco: Current User     Types: Chew   Substance and Sexual Activity    Alcohol use: No    Drug use: No    Sexual activity: Not on file   Other Topics Concern    Not on file   Social History Narrative    Not on file       Review of patient's allergies indicates:  No Known Allergies    Current Outpatient Medications:     allopurinol (ZYLOPRIM) 300 MG tablet, Take 300 mg by mouth once daily., Disp: , Rfl:     amiodarone (PACERONE) 200 MG Tab, Take 1 tablet (200 mg total) by mouth 2 (two) times daily., Disp: 180 tablet, Rfl: 1    aspirin (ECOTRIN) 81 MG EC tablet, Take 81 mg by mouth once daily. , Disp: , Rfl:     atorvastatin (LIPITOR) 80 MG tablet, Take 40 mg by mouth every evening. , Disp: , Rfl:     carvedilol (COREG) 25 MG tablet, Take 1 tablet (25 mg total) by mouth 2 (two) times daily with meals., Disp: 180 tablet, Rfl: 3    gabapentin (NEURONTIN) 100 MG capsule, Take 200 mg by mouth 3 (three) times daily. , Disp: , Rfl:     hydrocodone-acetaminophen 7.5-325mg (NORCO) 7.5-325 mg per tablet, Take 1 tablet by mouth every 6 (six) hours as needed for Pain., Disp: , Rfl:     insulin detemir (LEVEMIR) 100 unit/mL injection, Inject 38 Units into the skin 2 (two) times daily., Disp: , Rfl:     magnesium oxide (MAG-OX) 400 mg tablet, Take one tablet daily., Disp: , Rfl: 0    metformin (GLUCOPHAGE) 1000 MG tablet, Take 1,000 mg by mouth 2 (two) times daily with meals., Disp: , Rfl:     pantoprazole (PROTONIX) 40 MG tablet, Take 40 mg by mouth once daily., Disp: , Rfl:     paroxetine (PAXIL) 30 MG tablet, Take 30 mg by mouth 2 (two) times daily. , Disp: , Rfl:     sacubitril-valsartan (ENTRESTO) 24-26 mg per tablet, Take 1 tablet by mouth once daily., Disp: 60 tablet, Rfl: 3    SAXagliptin (ONGLYZA) 5 mg Tab tablet, Take 5 mg by mouth once daily., Disp: , Rfl:     etodolac (LODINE) 400 MG tablet, Take 400 mg by mouth 2 (two) times daily., Disp: , Rfl:     Review of Systems   Constitution: Negative for  "chills, diaphoresis, fever, weakness and malaise/fatigue. Weight loss: \FEW POUNDS.DIET.   HENT: Negative for congestion and nosebleeds. Hearing loss: CHRONIC.    Eyes: Negative for blurred vision and visual disturbance.   Cardiovascular: Negative for chest pain, claudication, cyanosis, dyspnea on exertion (MILD/), irregular heartbeat, leg swelling, near-syncope, orthopnea, palpitations, paroxysmal nocturnal dyspnea and syncope.   Respiratory: Negative for cough, hemoptysis, shortness of breath and snoring.    Endocrine: Negative for cold intolerance, heat intolerance and polyuria.        BG BETTER   Hematologic/Lymphatic: Negative for adenopathy and bleeding problem. Does not bruise/bleed easily.   Skin: Negative for itching and rash.   Musculoskeletal: Negative for back pain (CHRONIC) and joint pain (L HIP).        SLOW GAIT   Gastrointestinal: Negative for abdominal pain, change in bowel habit, dysphagia, jaundice and melena.   Genitourinary: Negative for dysuria, flank pain and frequency.   Neurological: Negative for brief paralysis, dizziness, focal weakness, light-headedness, loss of balance (USES CRUTCHES) and tremors.   Psychiatric/Behavioral: Negative for depression and memory loss. The patient is not nervous/anxious.    Allergic/Immunologic: Negative for hives and persistent infections.        Objective:      Vitals:    10/08/18 1455   BP: 122/66   Pulse: 77   Weight: 95 kg (209 lb 7 oz)   Height: 5' 6" (1.676 m)   PainSc:   6     Body mass index is 33.8 kg/m².    Physical Exam   Constitutional: He is oriented to person, place, and time. He appears well-developed and well-nourished. He is active.   OVERWEIGHT   HENT:   Head: Normocephalic and atraumatic.   Mouth/Throat: Oropharynx is clear and moist and mucous membranes are normal.   Eyes: Conjunctivae and EOM are normal. Pupils are equal, round, and reactive to light.   Neck: Normal range of motion. Neck supple. Normal carotid pulses, no hepatojugular " reflux and no JVD present. Carotid bruit is not present. No tracheal deviation, no edema and no erythema present. No thyromegaly present.   Cardiovascular: Normal rate and regular rhythm.  No extrasystoles are present. PMI is not displaced. Exam reveals no gallop, no distant heart sounds, no friction rub and no midsystolic click.   Murmur heard.  High-pitched holosystolic murmur is present with a grade of 1/6 at the apex.   Systolic murmur is also present at the lower right sternal border.  Pulses:       Carotid pulses are 2+ on the right side, and 2+ on the left side.       Radial pulses are 2+ on the right side, and 2+ on the left side.        Femoral pulses are 2+ on the right side, and 2+ on the left side.       Posterior tibial pulses are 2+ on the right side, and 2+ on the left side.   Pulmonary/Chest: Effort normal and breath sounds normal. No accessory muscle usage or stridor. No tachypnea and no bradypnea. No respiratory distress.   STERNOTOMY   Abdominal: Soft. Bowel sounds are normal. He exhibits no distension and no mass. There is no hepatosplenomegaly. There is no tenderness. There is no CVA tenderness.   Musculoskeletal: Normal range of motion. He exhibits no edema or deformity.   , USES  CRUTCHES   Lymphadenopathy:     He has no cervical adenopathy.   Neurological: He is alert and oriented to person, place, and time. He has normal strength. He displays no tremor. No cranial nerve deficit.   Skin: Skin is warm and dry. No cyanosis or erythema. No pallor.   Psychiatric: He has a normal mood and affect. His speech is normal and behavior is normal.               ..    Chemistry        Component Value Date/Time     06/07/2018 1553    K 4.6 06/07/2018 1553     06/07/2018 1553    CO2 24 06/07/2018 1553    BUN 13 06/07/2018 1553    CREATININE 1.0 06/07/2018 1553     (H) 06/07/2018 1553        Component Value Date/Time    CALCIUM 9.6 06/07/2018 1553    ALKPHOS 105 06/07/2018 1553    AST 15  06/07/2018 1553    AST 15 (L) 11/10/2015 0819    ALT 17 06/07/2018 1553    BILITOT 0.8 06/07/2018 1553    ESTGFRAFRICA >60.0 06/07/2018 1553    EGFRNONAA >60.0 06/07/2018 1553            ..  Lab Results   Component Value Date    CHOL 192 08/02/2017    CHOL 143 11/10/2015     Lab Results   Component Value Date    HDL 50 08/02/2017    HDL 49 11/10/2015     Lab Results   Component Value Date    LDLCALC 122.0 08/02/2017    LDLCALC 57.0 (L) 11/10/2015     Lab Results   Component Value Date    TRIG 100 08/02/2017    TRIG 185 (H) 11/10/2015     Lab Results   Component Value Date    CHOLHDL 26.0 08/02/2017    CHOLHDL 34.3 11/10/2015     ..  Lab Results   Component Value Date    WBC 10.41 06/07/2018    HGB 14.6 06/07/2018    HCT 43.4 06/07/2018    MCV 95 06/07/2018     06/07/2018       Test(s) Reviewed  I have reviewed the following in detail:  [] Stress test   [] Angiography   [] Echocardiogram   [] Labs   [] Other:       Assessment:         ICD-10-CM ICD-9-CM   1. Coronary artery disease involving native coronary artery of native heart without angina pectoris I25.10 414.01     Problem List Items Addressed This Visit        Cardiac/Vascular    Coronary artery disease involving native coronary artery of native heart without angina pectoris - Primary    Relevant Orders    SCHEDULED EKG 12-LEAD (to Muse)           Plan:         EKG SR, LAD, LVH, LAT T CHANGES, NO CHANGE, STABLE ACCEPTABLE CV RISK FOR HIP REPLACEMENT, DAILY WEIGHT, IMPROVED EF, ALL CV CLINICALLY STABLE, NO ANGINA, CLASS 2 HF, NO TIA, NO CLINICAL ARRHYTHMIA,CONTINUE CURRENT MEDS, EDUCATION, DIET, EXERCISE, WEIGHT LOSS, RTC IN 4 MO WITH LABS  Coronary artery disease involving native coronary artery of native heart without angina pectoris  -     SCHEDULED EKG 12-LEAD (to Muse); Future; Expected date: 04/08/2019    RTC Low level/low impact aerobic exercise 5x's/wk. Heart healthy diet and risk factor modification.    See labs and med orders.    Aerobic  exercise 5x's/wk. Heart healthy diet and risk factor modification.    See labs and med orders.

## 2018-10-31 ENCOUNTER — TELEPHONE (OUTPATIENT)
Dept: CARDIOLOGY | Facility: CLINIC | Age: 73
End: 2018-10-31

## 2018-10-31 NOTE — TELEPHONE ENCOUNTER
----- Message from Sonia Gutierrez sent at 10/31/2018  4:20 PM CDT -----  Contact: wife Esther Valencia (  Patient wife need to speak to nurse regarding patient has a surgery coming up and was cleared by Dr. Sarkar ,per wife     Patient wife states that she was informed to bring hard copy of clearance note and diagnostic notes day of appt   Patient wife states all documentation is needed before the 7th    Patient wife will like to know when can she  documentation     Please call to advice [phone] wife cell

## 2018-10-31 NOTE — TELEPHONE ENCOUNTER
Advised patient wife that she can  office notes & diagnostic test results at anytime. Esther verbalized understanding.

## 2018-11-16 ENCOUNTER — TELEPHONE (OUTPATIENT)
Dept: CARDIOLOGY | Facility: CLINIC | Age: 73
End: 2018-11-16

## 2018-11-16 NOTE — TELEPHONE ENCOUNTER
----- Message from Evangelist Bella sent at 11/16/2018 11:44 AM CST -----  Type: Needs Medical Advice    Who Called:  Sanna Phillips    Best Call Back Number: [phone]  Additional Information: Caller states that she has questions about the patient's recent tests, lab work EKG, XRays    Fax# [phone]

## 2019-01-07 ENCOUNTER — CLINICAL SUPPORT (OUTPATIENT)
Dept: CARDIOLOGY | Facility: CLINIC | Age: 74
End: 2019-01-07
Attending: INTERNAL MEDICINE
Payer: MEDICARE

## 2019-01-07 DIAGNOSIS — I50.1 LEFT HEART FAILURE: ICD-10-CM

## 2019-01-07 DIAGNOSIS — Z95.810 ICD (IMPLANTABLE CARDIOVERTER-DEFIBRILLATOR) IN PLACE: ICD-10-CM

## 2019-01-07 PROCEDURE — 93295 CARDIAC DEVICE CHECK CHECK - REMOTE: ICD-10-PCS | Mod: ,,, | Performed by: INTERNAL MEDICINE

## 2019-01-07 PROCEDURE — 93295 DEV INTERROG REMOTE 1/2/MLT: CPT | Mod: ,,, | Performed by: INTERNAL MEDICINE

## 2019-01-07 PROCEDURE — 93296 REM INTERROG EVL PM/IDS: CPT | Mod: PBBFAC,PO | Performed by: INTERNAL MEDICINE

## 2019-01-11 DIAGNOSIS — Z95.810 ICD (IMPLANTABLE CARDIOVERTER-DEFIBRILLATOR) IN PLACE: Primary | ICD-10-CM

## 2019-01-11 DIAGNOSIS — I50.1 LEFT HEART FAILURE: ICD-10-CM

## 2019-01-11 LAB
BATTERY VOLTAGE (V): 3 V
IMPEDANCE RA LEAD: 1178 OHMS
P/R-WAVE RA LEAD: 9.3 MV
THRESHOLD MS RA LEAD: 0.4 MS
THRESHOLD V RA LEAD: 2.25 V

## 2019-03-11 ENCOUNTER — CLINICAL SUPPORT (OUTPATIENT)
Dept: CARDIOLOGY | Facility: CLINIC | Age: 74
End: 2019-03-11
Attending: INTERNAL MEDICINE
Payer: MEDICARE

## 2019-03-11 ENCOUNTER — OFFICE VISIT (OUTPATIENT)
Dept: CARDIOLOGY | Facility: CLINIC | Age: 74
End: 2019-03-11
Payer: MEDICARE

## 2019-03-11 VITALS
DIASTOLIC BLOOD PRESSURE: 68 MMHG | HEIGHT: 66 IN | WEIGHT: 209.88 LBS | SYSTOLIC BLOOD PRESSURE: 131 MMHG | BODY MASS INDEX: 33.73 KG/M2 | HEART RATE: 52 BPM

## 2019-03-11 DIAGNOSIS — Z95.810 ICD (IMPLANTABLE CARDIOVERTER-DEFIBRILLATOR) IN PLACE: ICD-10-CM

## 2019-03-11 DIAGNOSIS — I25.10 CORONARY ARTERY DISEASE INVOLVING NATIVE CORONARY ARTERY OF NATIVE HEART WITHOUT ANGINA PECTORIS: ICD-10-CM

## 2019-03-11 DIAGNOSIS — I10 ESSENTIAL HYPERTENSION: ICD-10-CM

## 2019-03-11 DIAGNOSIS — I34.0 NON-RHEUMATIC MITRAL REGURGITATION: ICD-10-CM

## 2019-03-11 DIAGNOSIS — I50.1 LEFT HEART FAILURE: Primary | ICD-10-CM

## 2019-03-11 DIAGNOSIS — Z79.899 LONG TERM CURRENT USE OF AMIODARONE: ICD-10-CM

## 2019-03-11 DIAGNOSIS — E66.9 OBESITY, CLASS I, BMI 30-34.9: ICD-10-CM

## 2019-03-11 DIAGNOSIS — I50.1 LEFT HEART FAILURE: ICD-10-CM

## 2019-03-11 PROCEDURE — 99214 PR OFFICE/OUTPT VISIT, EST, LEVL IV, 30-39 MIN: ICD-10-PCS | Mod: S$PBB,,, | Performed by: INTERNAL MEDICINE

## 2019-03-11 PROCEDURE — 99214 OFFICE O/P EST MOD 30 MIN: CPT | Mod: S$PBB,,, | Performed by: INTERNAL MEDICINE

## 2019-03-11 PROCEDURE — 99999 PR PBB SHADOW E&M-EST. PATIENT-LVL III: ICD-10-PCS | Mod: PBBFAC,,, | Performed by: INTERNAL MEDICINE

## 2019-03-11 PROCEDURE — 99213 OFFICE O/P EST LOW 20 MIN: CPT | Mod: PBBFAC,PO | Performed by: INTERNAL MEDICINE

## 2019-03-11 PROCEDURE — 99999 PR PBB SHADOW E&M-EST. PATIENT-LVL III: CPT | Mod: PBBFAC,,, | Performed by: INTERNAL MEDICINE

## 2019-03-11 RX ORDER — CARVEDILOL 25 MG/1
25 TABLET ORAL 2 TIMES DAILY WITH MEALS
Qty: 180 TABLET | Refills: 1 | Status: SHIPPED | OUTPATIENT
Start: 2019-03-11 | End: 2019-07-15

## 2019-03-11 NOTE — PROGRESS NOTES
Subjective:    Patient ID:  Moiz Valencia is a 73 y.o. male who presents for Congestive Heart Failure and Coronary Artery Disease        HPI  NO LABS/PT, HAD HIP SURGERY DID WELL, NO CP, MILD SOB, BP BETTER, SEE ROS    Past Medical History:   Diagnosis Date    Acute coronary syndrome     CAD (coronary artery disease)     Cardiomyopathy     Carotid artery occlusion     CHF (congestive heart failure)     Diabetes mellitus, type 2     Heart murmur     Hypercholesteremia     Hypertension     Old MI (myocardial infarction)     Pneumonia     PUD (peptic ulcer disease)     Sleep apnea     Valvular regurgitation      Past Surgical History:   Procedure Laterality Date    CARDIAC PACEMAKER PLACEMENT      CARDIAC SURGERY      pt states that he had 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    COLONOSCOPY      CORONARY ANGIOPLASTY      CORONARY ARTERY BYPASS GRAFT      HERNIA REPAIR      HIP SURGERY Right 2018    HIP SURGERY Left 2018    INCISIONAL HERNIA REPAIR  2017    left ventricular  aneurysm repair       REPAIR-HERNIA-UMBILICAL-LAPAROSCOPIC WITH MESH N/A 2017    Performed by [facility]ity[facility] Zeng [facility] at [facility] OR    TONSILLECTOMY       Family History   Problem Relation Age of Onset    Diabetes Mother      Social History     Socioeconomic History    Marital status:      Spouse name: None    Number of children: None    Years of education: None    Highest education level: None   Social Needs    Financial resource strain: None    Food insecurity - worry: None    Food insecurity - inability: None    Transportation needs - medical: None    Transportation needs - non-medical: None   Occupational History    None   Tobacco Use    Smoking status: Former Smoker     Last attempt to quit: 1984     Years since quittin.7    Smokeless tobacco: Current User     Types: Chew   Substance and Sexual Activity    Alcohol use: No    Drug use: No    Sexual activity:  None   Other Topics Concern    None   Social History Narrative    None       Review of patient's allergies indicates:  No Known Allergies    Current Outpatient Medications:     allopurinol (ZYLOPRIM) 300 MG tablet, Take 300 mg by mouth once daily., Disp: , Rfl:     amiodarone (PACERONE) 200 MG Tab, Take 1 tablet (200 mg total) by mouth 2 (two) times daily., Disp: 180 tablet, Rfl: 1    aspirin (ECOTRIN) 81 MG EC tablet, Take 81 mg by mouth once daily. , Disp: , Rfl:     atorvastatin (LIPITOR) 80 MG tablet, Take 40 mg by mouth every evening. , Disp: , Rfl:     blood sugar diagnostic Strp, To check BG 2 times daily, to use with insurance preferred meter, Disp: 100 each, Rfl: 3    blood-glucose meter kit, To check BG 2 times daily, to use with insurance preferred meter, Disp: 1 each, Rfl: 0    carvedilol (COREG) 25 MG tablet, Take 1 tablet (25 mg total) by mouth 2 (two) times daily with meals., Disp: 180 tablet, Rfl: 1    DULoxetine (CYMBALTA) 20 MG capsule, Take 20 mg by mouth once daily., Disp: , Rfl:     hydrocodone-acetaminophen 7.5-325mg (NORCO) 7.5-325 mg per tablet, Take 1 tablet by mouth every 6 (six) hours as needed for Pain., Disp: , Rfl:     insulin detemir U-100 (LEVEMIR) 100 unit/mL injection, Inject 10 Units into the skin every evening., Disp: , Rfl:     lancets Misc, To check BG 2 times daily, to use with insurance preferred meter, Disp: 100 each, Rfl: 3    magnesium oxide (MAG-OX) 400 mg tablet, Take one tablet daily., Disp: , Rfl: 0    metformin (GLUCOPHAGE) 1000 MG tablet, Take 1,000 mg by mouth 2 (two) times daily with meals., Disp: , Rfl:     sacubitril-valsartan (ENTRESTO) 24-26 mg per tablet, Take 1 tablet by mouth once daily., Disp: 60 tablet, Rfl: 3    furosemide (LASIX) 20 MG tablet, Take 20 mg by mouth once daily. , Disp: , Rfl:     gabapentin (NEURONTIN) 100 MG capsule, Take 200 mg by mouth 3 (three) times daily. , Disp: , Rfl:     Review of Systems   Constitution: Negative  "for chills, diaphoresis, fever, weakness and malaise/fatigue. Weight loss: \FEW POUNDS.DIET.   HENT: Negative for congestion and nosebleeds. Hearing loss: CHRONIC.    Eyes: Negative for blurred vision and visual disturbance.   Cardiovascular: Negative for chest pain, claudication, cyanosis, dyspnea on exertion (MILD/), irregular heartbeat, leg swelling, near-syncope, orthopnea, palpitations, paroxysmal nocturnal dyspnea and syncope.   Respiratory: Negative for cough, hemoptysis, shortness of breath and snoring.    Endocrine: Negative for cold intolerance, heat intolerance and polyuria.        BG BETTER   Hematologic/Lymphatic: Negative for adenopathy and bleeding problem. Does not bruise/bleed easily.   Skin: Negative for itching and rash.   Musculoskeletal: Negative for back pain (CHRONIC) and joint pain (L KNEE).        SLOW GAIT   Gastrointestinal: Negative for abdominal pain, change in bowel habit, dysphagia, jaundice and melena.   Genitourinary: Negative for dysuria, flank pain and frequency.   Neurological: Negative for brief paralysis, dizziness, focal weakness, light-headedness and loss of balance (USES CRUTCHES).   Psychiatric/Behavioral: Negative for depression and memory loss. The patient is not nervous/anxious.    Allergic/Immunologic: Negative for hives and persistent infections.        Objective:      Vitals:    03/11/19 1423   BP: 131/68   Pulse: (!) 52   Weight: 95.2 kg (209 lb 14.1 oz)   Height: 5' 6" (1.676 m)   PainSc:   4     Body mass index is 33.88 kg/m².    Physical Exam   Constitutional: He is oriented to person, place, and time. He appears well-developed and well-nourished. He is active.   OVERWEIGHT   HENT:   Head: Normocephalic and atraumatic.   Mouth/Throat: Oropharynx is clear and moist and mucous membranes are normal.   Eyes: Conjunctivae and EOM are normal. Pupils are equal, round, and reactive to light.   Neck: Normal range of motion. Neck supple. Normal carotid pulses, no " hepatojugular reflux and no JVD present. Carotid bruit is not present. No edema and no erythema present. No thyromegaly present.   Cardiovascular: Normal rate and regular rhythm.  No extrasystoles are present. PMI is not displaced. Exam reveals no gallop, no distant heart sounds, no friction rub and no midsystolic click.   Murmur heard.  High-pitched holosystolic murmur is present with a grade of 1/6 at the apex.   Systolic murmur is also present at the lower right sternal border.  Pulses:       Carotid pulses are 2+ on the right side, and 2+ on the left side.       Radial pulses are 2+ on the right side, and 2+ on the left side.        Femoral pulses are 2+ on the right side, and 2+ on the left side.       Posterior tibial pulses are 2+ on the right side, and 2+ on the left side.   Pulmonary/Chest: Effort normal and breath sounds normal. No accessory muscle usage or stridor. No tachypnea and no bradypnea. No respiratory distress.   STERNOTOMY   Abdominal: Soft. Bowel sounds are normal. He exhibits no distension and no mass. There is no hepatosplenomegaly. There is no CVA tenderness.   Musculoskeletal: Normal range of motion. He exhibits no edema or deformity.   , USES  CRUTCHES   Lymphadenopathy:     He has no cervical adenopathy.   Neurological: He is alert and oriented to person, place, and time. He has normal strength. He displays no tremor. No cranial nerve deficit.   Skin: Skin is warm and dry. No cyanosis or erythema. No pallor.   Psychiatric: He has a normal mood and affect. His speech is normal and behavior is normal.               ..    Chemistry        Component Value Date/Time     06/07/2018 1553    K 4.6 06/07/2018 1553     06/07/2018 1553    CO2 24 06/07/2018 1553    BUN 13 06/07/2018 1553    CREATININE 1.0 06/07/2018 1553     (H) 06/07/2018 1553        Component Value Date/Time    CALCIUM 9.6 06/07/2018 1553    ALKPHOS 105 06/07/2018 1553    AST 15 06/07/2018 1553    AST 15 (L)  11/10/2015 0819    ALT 17 06/07/2018 1553    BILITOT 0.8 06/07/2018 1553    ESTGFRAFRICA >60.0 06/07/2018 1553    EGFRNONAA >60.0 06/07/2018 1553            ..  Lab Results   Component Value Date    CHOL 192 08/02/2017    CHOL 143 11/10/2015     Lab Results   Component Value Date    HDL 50 08/02/2017    HDL 49 11/10/2015     Lab Results   Component Value Date    LDLCALC 122.0 08/02/2017    LDLCALC 57.0 (L) 11/10/2015     Lab Results   Component Value Date    TRIG 100 08/02/2017    TRIG 185 (H) 11/10/2015     Lab Results   Component Value Date    CHOLHDL 26.0 08/02/2017    CHOLHDL 34.3 11/10/2015     ..  Lab Results   Component Value Date    WBC 10.41 06/07/2018    HGB 14.6 06/07/2018    HCT 43.4 06/07/2018    MCV 95 06/07/2018     06/07/2018       Test(s) Reviewed  I have reviewed the following in detail:  [] Stress test   [] Angiography   [] Echocardiogram   [] Labs   [x] Other:       Assessment:         ICD-10-CM ICD-9-CM   1. Left heart failure I50.1 428.1   2. Long term current use of amiodarone Z79.899 V58.69   3. Non-rheumatic mitral regurgitation I34.0 424.0   4. Essential hypertension I10 401.9   5. Coronary artery disease involving native coronary artery of native heart without angina pectoris I25.10 414.01   6. Obesity, Class I, BMI 30-34.9 E66.9 278.00     Problem List Items Addressed This Visit        Cardiac/Vascular    Coronary artery disease involving native coronary artery of native heart without angina pectoris    Hypertension    Left heart failure - Primary    Long term current use of amiodarone    Non-rheumatic mitral regurgitation       Endocrine    Obesity, Class I, BMI 30-34.9           Plan:         DAILY WEIGHT, EXPLAINED,LABS NOW, ALL CV CLINICALLY STABLE, NO ANGINA, CLASS 2 HF, NO TIA, NO CLINICAL ARRHYTHMIA,CONTINUE CURRENT MEDS, EDUCATION, DIET, EXERCISE, WEIGHT LOSS, RTC IN 6 MO, EXPLAINED PLAN TO PT AND WIFE  Left heart failure    Long term current use of  amiodarone    Non-rheumatic mitral regurgitation    Essential hypertension    Coronary artery disease involving native coronary artery of native heart without angina pectoris    Obesity, Class I, BMI 30-34.9    Other orders  -     carvedilol (COREG) 25 MG tablet; Take 1 tablet (25 mg total) by mouth 2 (two) times daily with meals.  Dispense: 180 tablet; Refill: 1    RTC Low level/low impact aerobic exercise 5x's/wk. Heart healthy diet and risk factor modification.    See labs and med orders.    Aerobic exercise 5x's/wk. Heart healthy diet and risk factor modification.    See labs and med orders.

## 2019-03-12 ENCOUNTER — TELEPHONE (OUTPATIENT)
Dept: CARDIOLOGY | Facility: CLINIC | Age: 74
End: 2019-03-12

## 2019-03-12 NOTE — TELEPHONE ENCOUNTER
----- Message from Elizabeth Burks sent at 3/12/2019  9:09 AM CDT -----  Contact: Swetha collier/David Sarmiento of the [facility] [phone]  Please send the orders for the lipid panel, comprehensive metabolic panel, TSH, T3 and T4 to [phone]. Thank you!

## 2019-03-13 ENCOUNTER — CLINICAL SUPPORT (OUTPATIENT)
Dept: CARDIOLOGY | Facility: CLINIC | Age: 74
End: 2019-03-13
Attending: INTERNAL MEDICINE
Payer: MEDICARE

## 2019-03-13 DIAGNOSIS — I50.1 LEFT HEART FAILURE: ICD-10-CM

## 2019-03-13 DIAGNOSIS — Z95.810 ICD (IMPLANTABLE CARDIOVERTER-DEFIBRILLATOR) IN PLACE: ICD-10-CM

## 2019-03-25 PROBLEM — G47.30 SLEEP APNEA: Status: ACTIVE | Noted: 2019-03-25

## 2019-03-27 ENCOUNTER — CLINICAL SUPPORT (OUTPATIENT)
Dept: CARDIOLOGY | Facility: CLINIC | Age: 74
End: 2019-03-27
Attending: INTERNAL MEDICINE
Payer: MEDICARE

## 2019-03-27 DIAGNOSIS — I50.1 LEFT HEART FAILURE: ICD-10-CM

## 2019-03-27 DIAGNOSIS — Z95.810 ICD (IMPLANTABLE CARDIOVERTER-DEFIBRILLATOR) IN PLACE: ICD-10-CM

## 2019-03-28 RX ORDER — AMIODARONE HYDROCHLORIDE 200 MG/1
200 TABLET ORAL 2 TIMES DAILY
Qty: 180 TABLET | Refills: 1 | Status: SHIPPED | OUTPATIENT
Start: 2019-03-28 | End: 2019-04-30 | Stop reason: SDUPTHER

## 2019-03-28 NOTE — TELEPHONE ENCOUNTER
----- Message from Phillip Hartley sent at 3/28/2019  3:53 PM CDT -----  Contact: pt  Type:  RX Refill Request    Who Called:  pt  Refill or New Rx:  refill  RX Name and Strength:  amiodarone (PACERONE) 200 MG Tab  How is the patient currently taking it? (ex. 1XDay):  2 x day  Is this a 30 day or 90 day RX:  90  Preferred Pharmacy with phone number:    [facility] [address] [place] [address]  Phone: [phone] Fax: [phone]    Local or Mail Order:    Ordering Provider:    Best Call Back Number:  [phone]  Additional Information:

## 2019-04-23 ENCOUNTER — CLINICAL SUPPORT (OUTPATIENT)
Dept: CARDIOLOGY | Facility: CLINIC | Age: 74
End: 2019-04-23
Attending: INTERNAL MEDICINE
Payer: MEDICARE

## 2019-04-23 DIAGNOSIS — Z95.810 ICD (IMPLANTABLE CARDIOVERTER-DEFIBRILLATOR) IN PLACE: ICD-10-CM

## 2019-04-23 DIAGNOSIS — I50.1 LEFT HEART FAILURE: ICD-10-CM

## 2019-04-26 DIAGNOSIS — I50.1 LEFT HEART FAILURE: ICD-10-CM

## 2019-04-26 DIAGNOSIS — Z95.810 ICD (IMPLANTABLE CARDIOVERTER-DEFIBRILLATOR) IN PLACE: Primary | ICD-10-CM

## 2019-04-30 RX ORDER — AMIODARONE HYDROCHLORIDE 200 MG/1
200 TABLET ORAL 2 TIMES DAILY
Qty: 180 TABLET | Refills: 1 | Status: SHIPPED | OUTPATIENT
Start: 2019-04-30 | End: 2020-01-23

## 2019-04-30 NOTE — TELEPHONE ENCOUNTER
----- Message from Attila Chanel sent at 4/30/2019 11:18 AM CDT -----  Contact: self   Patient need a refill on amiodarone 200 MG 90 day supply please send to [facility], any questions please call back at [phone] (home)     [facility] [address] [place] [address] - [facility] [address] [facility] [address]  [address]  [place] [address]  Phone: [phone] Fax: [phone]

## 2019-05-23 ENCOUNTER — CLINICAL SUPPORT (OUTPATIENT)
Dept: CARDIOLOGY | Facility: CLINIC | Age: 74
End: 2019-05-23
Attending: INTERNAL MEDICINE
Payer: MEDICARE

## 2019-05-23 DIAGNOSIS — Z95.810 ICD (IMPLANTABLE CARDIOVERTER-DEFIBRILLATOR) IN PLACE: ICD-10-CM

## 2019-05-23 DIAGNOSIS — I50.1 LEFT HEART FAILURE: ICD-10-CM

## 2019-06-24 ENCOUNTER — CLINICAL SUPPORT (OUTPATIENT)
Dept: CARDIOLOGY | Facility: CLINIC | Age: 74
End: 2019-06-24
Attending: INTERNAL MEDICINE
Payer: MEDICARE

## 2019-06-24 DIAGNOSIS — Z95.810 ICD (IMPLANTABLE CARDIOVERTER-DEFIBRILLATOR) IN PLACE: ICD-10-CM

## 2019-06-24 DIAGNOSIS — I50.1 LEFT HEART FAILURE: ICD-10-CM

## 2019-07-08 ENCOUNTER — TELEPHONE (OUTPATIENT)
Dept: SURGERY | Facility: CLINIC | Age: 74
End: 2019-07-08

## 2019-07-08 NOTE — TELEPHONE ENCOUNTER
----- Message from Gloria Tesfaye sent at 7/8/2019 11:43 AM CDT -----  Contact: [phone]  Type:  Sooner Apoointment Request    Caller is requesting a sooner appointment.  Caller declined first available appointment listed below.  Caller will not accept being placed on the waitlist and is requesting a message be sent to doctor.    Name of Caller: IVAN GRAHAM [[id number]]  When is the first available appointment? 7/24/19  Symptoms: gallstones  Best Call Back Number:  [phone]  Additional Information:  Was advised to follow up from the hospital

## 2019-07-09 ENCOUNTER — TELEPHONE (OUTPATIENT)
Dept: SURGERY | Facility: CLINIC | Age: 74
End: 2019-07-09

## 2019-07-09 NOTE — TELEPHONE ENCOUNTER
----- Message from Gi Poe sent at 7/9/2019  2:34 PM CDT -----  Type:  Patient Returning Call    Who Called:  Self Who Left Message for Patient:  Elizabeth   Does the patient know what this is regarding?:  An earlier appointment to be seen   Best Call Back Number: [phone] Additional Information:

## 2019-07-22 ENCOUNTER — CLINICAL SUPPORT (OUTPATIENT)
Dept: CARDIOLOGY | Facility: CLINIC | Age: 74
End: 2019-07-22
Attending: INTERNAL MEDICINE
Payer: MEDICARE

## 2019-07-22 DIAGNOSIS — Z95.810 ICD (IMPLANTABLE CARDIOVERTER-DEFIBRILLATOR) IN PLACE: ICD-10-CM

## 2019-07-22 DIAGNOSIS — I50.1 LEFT HEART FAILURE: ICD-10-CM

## 2019-09-04 PROBLEM — K80.10 CALCULUS OF GALLBLADDER WITH CHRONIC CHOLECYSTITIS WITHOUT OBSTRUCTION: Status: ACTIVE | Noted: 2019-09-04

## 2019-09-16 ENCOUNTER — OFFICE VISIT (OUTPATIENT)
Dept: CARDIOLOGY | Facility: CLINIC | Age: 74
End: 2019-09-16
Payer: MEDICARE

## 2019-09-16 ENCOUNTER — LAB VISIT (OUTPATIENT)
Dept: LAB | Facility: HOSPITAL | Age: 74
End: 2019-09-16
Attending: INTERNAL MEDICINE
Payer: MEDICARE

## 2019-09-16 VITALS
BODY MASS INDEX: 33.59 KG/M2 | SYSTOLIC BLOOD PRESSURE: 128 MMHG | HEIGHT: 66 IN | HEART RATE: 56 BPM | WEIGHT: 209 LBS | DIASTOLIC BLOOD PRESSURE: 78 MMHG

## 2019-09-16 DIAGNOSIS — Z79.899 LONG TERM CURRENT USE OF AMIODARONE: ICD-10-CM

## 2019-09-16 DIAGNOSIS — I34.0 NON-RHEUMATIC MITRAL REGURGITATION: ICD-10-CM

## 2019-09-16 DIAGNOSIS — I50.1 LEFT HEART FAILURE: Primary | ICD-10-CM

## 2019-09-16 DIAGNOSIS — E66.9 OBESITY, CLASS I, BMI 30-34.9: ICD-10-CM

## 2019-09-16 DIAGNOSIS — E78.00 HYPERCHOLESTEROLEMIA: ICD-10-CM

## 2019-09-16 DIAGNOSIS — I25.10 CORONARY ARTERY DISEASE INVOLVING NATIVE CORONARY ARTERY OF NATIVE HEART WITHOUT ANGINA PECTORIS: ICD-10-CM

## 2019-09-16 DIAGNOSIS — I10 ESSENTIAL HYPERTENSION: ICD-10-CM

## 2019-09-16 DIAGNOSIS — I50.1 LEFT HEART FAILURE: ICD-10-CM

## 2019-09-16 LAB
ALBUMIN SERPL BCP-MCNC: 4 G/DL (ref 3.5–5.2)
ALP SERPL-CCNC: 85 U/L (ref 55–135)
ALT SERPL W/O P-5'-P-CCNC: 20 U/L (ref 10–44)
ANION GAP SERPL CALC-SCNC: 8 MMOL/L (ref 8–16)
AST SERPL-CCNC: 14 U/L (ref 10–40)
BILIRUB SERPL-MCNC: 0.4 MG/DL (ref 0.1–1)
BUN SERPL-MCNC: 25 MG/DL (ref 8–23)
CALCIUM SERPL-MCNC: 9.9 MG/DL (ref 8.7–10.5)
CHLORIDE SERPL-SCNC: 104 MMOL/L (ref 95–110)
CO2 SERPL-SCNC: 27 MMOL/L (ref 23–29)
CREAT SERPL-MCNC: 1.1 MG/DL (ref 0.5–1.4)
EST. GFR  (AFRICAN AMERICAN): >60 ML/MIN/1.73 M^2
EST. GFR  (NON AFRICAN AMERICAN): >60 ML/MIN/1.73 M^2
GLUCOSE SERPL-MCNC: 147 MG/DL (ref 70–110)
POTASSIUM SERPL-SCNC: 5.2 MMOL/L (ref 3.5–5.1)
PROT SERPL-MCNC: 7.2 G/DL (ref 6–8.4)
SODIUM SERPL-SCNC: 139 MMOL/L (ref 136–145)
T3 SERPL-MCNC: 50 NG/DL (ref 60–180)
T4 SERPL-MCNC: 9.2 UG/DL (ref 4.5–11.5)
TSH SERPL DL<=0.005 MIU/L-ACNC: 2.24 UIU/ML (ref 0.4–4)

## 2019-09-16 PROCEDURE — 36415 COLL VENOUS BLD VENIPUNCTURE: CPT | Mod: PO

## 2019-09-16 PROCEDURE — 84443 ASSAY THYROID STIM HORMONE: CPT

## 2019-09-16 PROCEDURE — 84436 ASSAY OF TOTAL THYROXINE: CPT

## 2019-09-16 PROCEDURE — 99214 PR OFFICE/OUTPT VISIT, EST, LEVL IV, 30-39 MIN: ICD-10-PCS | Mod: S$PBB,,, | Performed by: INTERNAL MEDICINE

## 2019-09-16 PROCEDURE — 99999 PR PBB SHADOW E&M-EST. PATIENT-LVL IV: CPT | Mod: PBBFAC,,, | Performed by: INTERNAL MEDICINE

## 2019-09-16 PROCEDURE — 99214 OFFICE O/P EST MOD 30 MIN: CPT | Mod: PBBFAC,PO | Performed by: INTERNAL MEDICINE

## 2019-09-16 PROCEDURE — 99999 PR PBB SHADOW E&M-EST. PATIENT-LVL IV: ICD-10-PCS | Mod: PBBFAC,,, | Performed by: INTERNAL MEDICINE

## 2019-09-16 PROCEDURE — 99214 OFFICE O/P EST MOD 30 MIN: CPT | Mod: S$PBB,,, | Performed by: INTERNAL MEDICINE

## 2019-09-16 PROCEDURE — 80053 COMPREHEN METABOLIC PANEL: CPT

## 2019-09-16 PROCEDURE — 84480 ASSAY TRIIODOTHYRONINE (T3): CPT

## 2019-09-16 NOTE — PROGRESS NOTES
Subjective:    Patient ID:  Moiz Valencia is a 73 y.o. male who presents for Hypertension and Hyperlipidemia        HPI  RECENT LABS NOTED, BMP OK, HAD CHOLECYSTECTOMY 2 W AGO, DOING OK, NO CHEST PAIN, NO SIGNIFICANT SHORTNESS OF BREATH, NO TIA TYPE SYMPTOMS, NO NEAR-SYNCOPE , NO AICD SHOCK , SEE ROS    Past Medical History:   Diagnosis Date    Acute coronary syndrome     CAD (coronary artery disease)     Cardiomyopathy     Carotid artery occlusion     CHF (congestive heart failure)     Diabetes mellitus, type 2     H/O cataract removal with insertion of prosthetic lens     bilat    Heart murmur     Hypercholesteremia     Hypertension     Old MI (myocardial infarction)     Pneumonia     PUD (peptic ulcer disease)     Sleep apnea     Valvular regurgitation      Past Surgical History:   Procedure Laterality Date    CARDIAC PACEMAKER PLACEMENT      CARDIAC SURGERY      pt states that he had 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    CHOLECYSTECTOMY-LAPAROSCOPIC N/A 9/4/2019    Performed by Ad[facility]a Zeng [facility] at [facility] OR    COLONOSCOPY      CORONARY ANGIOPLASTY      CORONARY ARTERY BYPASS GRAFT      HERNIA REPAIR      HIP SURGERY Right 07/05/2018    HIP SURGERY Left 11/26/2018    INCISIONAL HERNIA REPAIR  06/05/2017    left ventricular  aneurysm repair       REPAIR-HERNIA-UMBILICAL-LAPAROSCOPIC WITH MESH N/A 6/5/2017    Performed by [facility]a [facility]eng [facility] at [facility] OR    TONSILLECTOMY       Family History   Problem Relation Age of Onset    Diabetes Mother     Heart disease Mother     No Known Problems Father      Social History     Socioeconomic History    Marital status:      Spouse name: Not on file    Number of children: Not on file    Years of education: Not on file    Highest education level: Not on file   Occupational History    Not on file   Social Needs    Financial resource strain: Not on file    Food insecurity:     Worry: Not on file     Inability: Not  on file    Transportation needs:     Medical: Not on file     Non-medical: Not on file   Tobacco Use    Smoking status: Former Smoker     Last attempt to quit: 1984     Years since quittin.3    Smokeless tobacco: Former User     Types: Chew     Quit date: 2018   Substance and Sexual Activity    Alcohol use: No    Drug use: No    Sexual activity: Not on file   Lifestyle    Physical activity:     Days per week: Not on file     Minutes per session: Not on file    Stress: Not on file   Relationships    Social connections:     Talks on phone: Not on file     Gets together: Not on file     Attends Congregational service: Not on file     Active member of club or organization: Not on file     Attends meetings of clubs or organizations: Not on file     Relationship status: Not on file   Other Topics Concern    Not on file   Social History Narrative    Not on file       Review of patient's allergies indicates:  No Known Allergies    Current Outpatient Medications:     allopurinol (ZYLOPRIM) 300 MG tablet, Take 300 mg by mouth once daily., Disp: , Rfl:     amiodarone (PACERONE) 200 MG Tab, Take 1 tablet (200 mg total) by mouth 2 (two) times daily., Disp: 180 tablet, Rfl: 1    aspirin (ECOTRIN) 81 MG EC tablet, Take 1 tablet (81 mg total) by mouth once daily., Disp: , Rfl: 0    atorvastatin (LIPITOR) 80 MG tablet, Take 1 tablet (80 mg total) by mouth every evening., Disp: 90 tablet, Rfl: 1    blood sugar diagnostic Strp, To check BG 2 times daily, to use with insurance preferred meter, Disp: 100 each, Rfl: 3    blood-glucose meter kit, To check BG 2 times daily, to use with insurance preferred meter, Disp: 1 each, Rfl: 0    celecoxib (CELEBREX) 100 MG capsule, Take 100 mg by mouth 2 (two) times daily., Disp: , Rfl:     donepezil (ARICEPT) 10 MG tablet, Take 10 mg by mouth once daily., Disp: , Rfl:     DULoxetine (CYMBALTA) 20 MG capsule, Take 20 mg by mouth once daily., Disp: , Rfl:     gabapentin  (NEURONTIN) 100 MG capsule, Take 1 capsule (100 mg total) by mouth 2 (two) times daily. (Patient taking differently: Take 100 mg by mouth once daily. ), Disp: , Rfl:     insulin detemir U-100 (LEVEMIR) 100 unit/mL injection, Inject 10 Units into the skin every evening., Disp: , Rfl:     lancets Misc, To check BG 2 times daily, to use with insurance preferred meter, Disp: 100 each, Rfl: 3    levothyroxine (SYNTHROID) 50 MCG tablet, Take 1 tablet (50 mcg total) by mouth once daily., Disp: 90 tablet, Rfl: 1    magnesium oxide (MAG-OX) 400 mg tablet, Take one tablet daily.  night time, Disp: , Rfl: 0    metformin (GLUCOPHAGE) 1000 MG tablet, Take 1,000 mg by mouth 2 (two) times daily with meals., Disp: , Rfl:     oxybutynin (DITROPAN-XL) 5 MG TR24, Take 1 tablet (5 mg total) by mouth once daily. (Patient taking differently: Take 5 mg by mouth once daily. Not taking), Disp: 90 tablet, Rfl: 1    sacubitril-valsartan (ENTRESTO) 24-26 mg per tablet, Take 1 tablet by mouth once daily., Disp: 180 tablet, Rfl: 1    Review of Systems   Constitution: Negative for chills, diaphoresis, fever, malaise/fatigue and night sweats.   HENT: Negative for congestion and nosebleeds. Hearing loss: CHRONIC.    Eyes: Negative for blurred vision and visual disturbance.        B CATARACT SURGEY   Cardiovascular: Negative for chest pain, claudication, cyanosis, dyspnea on exertion (MILD/), irregular heartbeat, leg swelling, near-syncope, orthopnea, palpitations, paroxysmal nocturnal dyspnea and syncope.   Respiratory: Negative for cough, hemoptysis, shortness of breath and wheezing.    Endocrine: Negative for cold intolerance, heat intolerance and polyuria.        BG BETTER   Hematologic/Lymphatic: Negative for adenopathy and bleeding problem. Does not bruise/bleed easily.   Skin: Negative for itching and rash.   Musculoskeletal: Negative for arthritis (CHRONIC) and back pain (CHRONIC).        SLOW GAIT   Gastrointestinal: Negative for  "abdominal pain, change in bowel habit, dysphagia, jaundice and melena.   Genitourinary: Negative for dysuria, flank pain and frequency.   Neurological: Negative for brief paralysis, dizziness, focal weakness, light-headedness, loss of balance (USES CRUTCHES) and weakness.   Psychiatric/Behavioral: Negative for depression and memory loss. The patient is not nervous/anxious.    Allergic/Immunologic: Negative for hives and persistent infections.        Objective:      Vitals:    09/16/19 1042   BP: 128/78   Pulse: (!) 56   Weight: 94.8 kg (208 lb 15.9 oz)   Height: 5' 6" (1.676 m)   PainSc:   3     Body mass index is 33.73 kg/m².    Physical Exam   Constitutional: He is oriented to person, place, and time. He appears well-developed and well-nourished. He is active.   OVERWEIGHT   HENT:   Head: Normocephalic and atraumatic.   Mouth/Throat: Oropharynx is clear and moist and mucous membranes are normal.   Eyes: Pupils are equal, round, and reactive to light. Conjunctivae and EOM are normal.   Neck: Normal range of motion. Neck supple. Normal carotid pulses, no hepatojugular reflux and no JVD present. Carotid bruit is not present. No edema and no erythema present. No thyromegaly present.   Cardiovascular: Normal rate and regular rhythm.  No extrasystoles are present. PMI is not displaced. Exam reveals no gallop, no distant heart sounds, no friction rub and no midsystolic click.   Murmur heard.  High-pitched holosystolic murmur is present with a grade of 1/6 at the apex.   Systolic murmur is also present at the lower right sternal border.  Pulses:       Carotid pulses are 2+ on the right side, and 2+ on the left side.       Radial pulses are 2+ on the right side, and 2+ on the left side.        Femoral pulses are 2+ on the right side, and 2+ on the left side.       Posterior tibial pulses are 2+ on the right side, and 2+ on the left side.   Pulmonary/Chest: Effort normal and breath sounds normal. No accessory muscle usage " or stridor. No tachypnea and no bradypnea. No respiratory distress.   STERNOTOMY   Abdominal: Soft. Bowel sounds are normal. He exhibits no distension and no mass. There is no hepatosplenomegaly. There is no CVA tenderness.   Musculoskeletal: Normal range of motion. He exhibits no edema or deformity.   SLOW GAIT   Lymphadenopathy:     He has no cervical adenopathy.   Neurological: He is alert and oriented to person, place, and time. He has normal strength. He displays no tremor. No cranial nerve deficit.   Skin: Skin is warm and dry. No cyanosis or erythema. No pallor.   Psychiatric: He has a normal mood and affect. His speech is normal and behavior is normal.               ..    Chemistry        Component Value Date/Time     09/16/2019 1138    K 5.2 (H) 09/16/2019 1138     09/16/2019 1138    CO2 27 09/16/2019 1138    BUN 25 (H) 09/16/2019 1138    CREATININE 1.1 09/16/2019 1138     (H) 09/16/2019 1138        Component Value Date/Time    CALCIUM 9.9 09/16/2019 1138    ALKPHOS 85 09/16/2019 1138    AST 14 09/16/2019 1138    AST 15 (L) 11/10/2015 0819    ALT 20 09/16/2019 1138    BILITOT 0.4 09/16/2019 1138    ESTGFRAFRICA >60.0 09/16/2019 1138    EGFRNONAA >60.0 09/16/2019 1138            ..  Lab Results   Component Value Date    CHOL 180 03/25/2019    CHOL 192 08/02/2017    CHOL 143 11/10/2015     Lab Results   Component Value Date    HDL 52 03/25/2019    HDL 50 08/02/2017    HDL 49 11/10/2015     Lab Results   Component Value Date    LDLCALC 104.4 03/25/2019    LDLCALC 122.0 08/02/2017    LDLCALC 57.0 (L) 11/10/2015     Lab Results   Component Value Date    TRIG 118 03/25/2019    TRIG 100 08/02/2017    TRIG 185 (H) 11/10/2015     Lab Results   Component Value Date    CHOLHDL 28.9 03/25/2019    CHOLHDL 26.0 08/02/2017    CHOLHDL 34.3 11/10/2015     ..  Lab Results   Component Value Date    WBC 10.41 06/07/2018    HGB 15.1 09/04/2019    HCT 40.0 03/25/2019    MCV 95 06/07/2018     06/07/2018        Test(s) Reviewed  I have reviewed the following in detail:  [] Stress test   [] Angiography   [] Echocardiogram   [x] Labs   [x] Other:       Assessment:         ICD-10-CM ICD-9-CM   1. Left heart failure I50.1 428.1   2. Essential hypertension I10 401.9   3. Coronary artery disease involving native coronary artery of native heart without angina pectoris I25.10 414.01   4. Long term current use of amiodarone Z79.899 V58.69   5. Hypercholesterolemia E78.00 272.0   6. Non-rheumatic mitral regurgitation I34.0 424.0   7. Obesity, Class I, BMI 30-34.9 E66.9 278.00     Problem List Items Addressed This Visit        Cardiac/Vascular    Coronary artery disease involving native coronary artery of native heart without angina pectoris    Relevant Orders    Comprehensive metabolic panel (Completed)    Hypertension    Relevant Orders    Comprehensive metabolic panel (Completed)    Left heart failure - Primary    Relevant Orders    Comprehensive metabolic panel (Completed)    Long term current use of amiodarone    Relevant Orders    Comprehensive metabolic panel (Completed)    TSH (Completed)    T3 (Completed)    T4 (Completed)    Hypercholesterolemia    Relevant Orders    Comprehensive metabolic panel (Completed)    Non-rheumatic mitral regurgitation    Relevant Orders    Comprehensive metabolic panel (Completed)       Endocrine    Obesity, Class I, BMI 30-34.9           Plan:     DAILY WEIGHT, EXPLAINED 2 LB PER DAY OR 5 LB PER WEEK WEIGHT GAIN RULE, CHECK LABS TODAY,ALL CV CLINICALLY STABLE, NO ANGINA, CLASS 2 HF, NO TIA, NO CLINICAL ARRHYTHMIA,CONTINUE CURRENT MEDS, EDUCATION, DIET, EXERCISE, WEIGHT LOSS RTC IN 6 MO, EXPLAINED PLAN TO THE PATIENT AND HIS WIFE      Left heart failure  -     Comprehensive metabolic panel; Future; Expected date: 09/16/2019    Essential hypertension  -     Comprehensive metabolic panel; Future; Expected date: 09/16/2019    Coronary artery disease involving native coronary artery of native  heart without angina pectoris  -     Comprehensive metabolic panel; Future; Expected date: 09/16/2019    Long term current use of amiodarone  -     Comprehensive metabolic panel; Future; Expected date: 09/16/2019  -     TSH; Future; Expected date: 09/16/2019  -     T3; Future; Expected date: 09/16/2019  -     T4; Future; Expected date: 09/16/2019    Hypercholesterolemia  -     Comprehensive metabolic panel; Future; Expected date: 09/16/2019    Non-rheumatic mitral regurgitation  -     Comprehensive metabolic panel; Future; Expected date: 09/16/2019    Obesity, Class I, BMI 30-34.9    Other orders  -     sacubitril-valsartan (ENTRESTO) 24-26 mg per tablet; Take 1 tablet by mouth once daily.  Dispense: 180 tablet; Refill: 1    RTC Low level/low impact aerobic exercise 5x's/wk. Heart healthy diet and risk factor modification.    See labs and med orders.    Aerobic exercise 5x's/wk. Heart healthy diet and risk factor modification.    See labs and med orders.

## 2019-10-22 ENCOUNTER — CLINICAL SUPPORT (OUTPATIENT)
Dept: CARDIOLOGY | Facility: CLINIC | Age: 74
End: 2019-10-22
Payer: MEDICARE

## 2019-10-22 PROCEDURE — 93297 CARDIAC DEVICE CHECK - REMOTE: ICD-10-PCS | Mod: ,,, | Performed by: INTERNAL MEDICINE

## 2019-10-22 PROCEDURE — 93297 REM INTERROG DEV EVAL ICPMS: CPT | Mod: ,,, | Performed by: INTERNAL MEDICINE

## 2019-11-26 PROBLEM — Z96.1 H/O CATARACT REMOVAL WITH INSERTION OF PROSTHETIC LENS: Status: ACTIVE | Noted: 2019-11-26

## 2019-11-26 PROBLEM — Z98.49 H/O CATARACT REMOVAL WITH INSERTION OF PROSTHETIC LENS: Status: ACTIVE | Noted: 2019-11-26

## 2020-01-23 RX ORDER — AMIODARONE HYDROCHLORIDE 200 MG/1
TABLET ORAL
Qty: 180 TABLET | Refills: 1 | Status: SHIPPED | OUTPATIENT
Start: 2020-01-23 | End: 2020-04-29

## 2020-02-24 DIAGNOSIS — I50.1 LEFT HEART FAILURE: ICD-10-CM

## 2020-02-24 DIAGNOSIS — Z95.810 ICD (IMPLANTABLE CARDIOVERTER-DEFIBRILLATOR) IN PLACE: Primary | ICD-10-CM

## 2020-03-03 ENCOUNTER — CLINICAL SUPPORT (OUTPATIENT)
Dept: CARDIOLOGY | Facility: CLINIC | Age: 75
End: 2020-03-03
Attending: INTERNAL MEDICINE
Payer: MEDICARE

## 2020-03-03 DIAGNOSIS — Z95.810 ICD (IMPLANTABLE CARDIOVERTER-DEFIBRILLATOR) IN PLACE: ICD-10-CM

## 2020-03-03 DIAGNOSIS — I50.1 LEFT HEART FAILURE: ICD-10-CM

## 2020-04-29 RX ORDER — AMIODARONE HYDROCHLORIDE 200 MG/1
TABLET ORAL
Qty: 180 TABLET | Refills: 1 | Status: SHIPPED | OUTPATIENT
Start: 2020-04-29 | End: 2020-11-18 | Stop reason: SDUPTHER

## 2020-05-19 ENCOUNTER — CLINICAL SUPPORT (OUTPATIENT)
Dept: CARDIOLOGY | Facility: CLINIC | Age: 75
End: 2020-05-19
Payer: MEDICARE

## 2020-06-18 ENCOUNTER — CLINICAL SUPPORT (OUTPATIENT)
Dept: CARDIOLOGY | Facility: CLINIC | Age: 75
End: 2020-06-18
Payer: MEDICARE

## 2020-06-18 DIAGNOSIS — Z95.810 PRESENCE OF AUTOMATIC (IMPLANTABLE) CARDIAC DEFIBRILLATOR: ICD-10-CM

## 2020-06-18 PROCEDURE — 93297 CARDIAC DEVICE CHECK - REMOTE: ICD-10-PCS | Mod: ,,, | Performed by: INTERNAL MEDICINE

## 2020-06-18 PROCEDURE — 93297 REM INTERROG DEV EVAL ICPMS: CPT | Mod: ,,, | Performed by: INTERNAL MEDICINE

## 2020-07-01 ENCOUNTER — CLINICAL SUPPORT (OUTPATIENT)
Dept: CARDIOLOGY | Facility: CLINIC | Age: 75
End: 2020-07-01
Payer: MEDICARE

## 2020-07-21 ENCOUNTER — TELEPHONE (OUTPATIENT)
Dept: CARDIOLOGY | Facility: CLINIC | Age: 75
End: 2020-07-21

## 2020-07-22 ENCOUNTER — TELEPHONE (OUTPATIENT)
Dept: CARDIOLOGY | Facility: CLINIC | Age: 75
End: 2020-07-22

## 2020-07-23 ENCOUNTER — CLINICAL SUPPORT (OUTPATIENT)
Dept: CARDIOLOGY | Facility: CLINIC | Age: 75
End: 2020-07-23
Payer: MEDICARE

## 2020-07-23 DIAGNOSIS — Z95.810 PRESENCE OF AUTOMATIC (IMPLANTABLE) CARDIAC DEFIBRILLATOR: ICD-10-CM

## 2020-07-23 PROCEDURE — 93297 REM INTERROG DEV EVAL ICPMS: CPT | Mod: ,,, | Performed by: INTERNAL MEDICINE

## 2020-07-23 PROCEDURE — 93297 CARDIAC DEVICE CHECK - REMOTE: ICD-10-PCS | Mod: ,,, | Performed by: INTERNAL MEDICINE

## 2020-07-24 ENCOUNTER — TELEPHONE (OUTPATIENT)
Dept: CARDIOLOGY | Facility: CLINIC | Age: 75
End: 2020-07-24

## 2020-08-05 ENCOUNTER — TELEPHONE (OUTPATIENT)
Dept: CARDIOLOGY | Facility: CLINIC | Age: 75
End: 2020-08-05

## 2020-08-05 NOTE — TELEPHONE ENCOUNTER
Received an alert for increased Optivol:    Spoke to pt., pt denies increased shortness of breath, edema or weight gain. Pt. States he weighs himself often and it has been stable @ 195 lbs. Notified to call clinic if develops shortness of breath, swelling or if weight increases > 3 lbs in a 24 hr period or > 5 lbs in a week.. Pt. Verbalized understanding

## 2020-08-27 ENCOUNTER — CLINICAL SUPPORT (OUTPATIENT)
Dept: CARDIOLOGY | Facility: CLINIC | Age: 75
End: 2020-08-27
Payer: MEDICARE

## 2020-09-26 ENCOUNTER — CLINICAL SUPPORT (OUTPATIENT)
Dept: CARDIOLOGY | Facility: CLINIC | Age: 75
End: 2020-09-26
Payer: MEDICARE

## 2020-09-26 DIAGNOSIS — Z95.810 PRESENCE OF AUTOMATIC (IMPLANTABLE) CARDIAC DEFIBRILLATOR: ICD-10-CM

## 2020-09-26 PROCEDURE — 93297 CARDIAC DEVICE CHECK - REMOTE: ICD-10-PCS | Mod: ,,, | Performed by: INTERNAL MEDICINE

## 2020-09-26 PROCEDURE — 93297 REM INTERROG DEV EVAL ICPMS: CPT | Mod: ,,, | Performed by: INTERNAL MEDICINE

## 2020-09-29 ENCOUNTER — CLINICAL SUPPORT (OUTPATIENT)
Dept: CARDIOLOGY | Facility: CLINIC | Age: 75
End: 2020-09-29
Payer: MEDICARE

## 2020-09-29 DIAGNOSIS — Z95.810 PRESENCE OF AUTOMATIC (IMPLANTABLE) CARDIAC DEFIBRILLATOR: ICD-10-CM

## 2020-10-16 ENCOUNTER — TELEPHONE (OUTPATIENT)
Dept: CARDIOLOGY | Facility: CLINIC | Age: 75
End: 2020-10-16

## 2020-10-16 NOTE — TELEPHONE ENCOUNTER
"Received the following alert from [facility]:        HF status: Possible OptiVol fluid accumulation: 17-Jul-2020 -- ongoing. TI: remains below reference impedance. No change in activity level.     Spoke to pt, pt states " I feel great." Pt. denies  shortness of breath, weight gain or edema. Discussed daily weights and instructed to notify clinic if weight increases > 3 lbs in 24 hrs or > 5 lbs in a week, or if pt develops any symptoms. Pt. Verbalized understanding  "

## 2020-10-26 ENCOUNTER — CLINICAL SUPPORT (OUTPATIENT)
Dept: CARDIOLOGY | Facility: CLINIC | Age: 75
End: 2020-10-26
Payer: MEDICARE

## 2020-10-26 DIAGNOSIS — Z95.810 PRESENCE OF AUTOMATIC (IMPLANTABLE) CARDIAC DEFIBRILLATOR: ICD-10-CM

## 2020-10-26 PROCEDURE — 93297 CARDIAC DEVICE CHECK - REMOTE: ICD-10-PCS | Mod: ,,, | Performed by: INTERNAL MEDICINE

## 2020-10-26 PROCEDURE — 93297 REM INTERROG DEV EVAL ICPMS: CPT | Mod: ,,, | Performed by: INTERNAL MEDICINE

## 2020-11-12 ENCOUNTER — TELEPHONE (OUTPATIENT)
Dept: CARDIOLOGY | Facility: CLINIC | Age: 75
End: 2020-11-12

## 2020-11-12 NOTE — TELEPHONE ENCOUNTER
Received alert from [employer] home monitoring:    HF status: Possible OptiVol fluid accumulation: 30-Oct-2020 -- ongoing.  TI: has been below reference impedance, OptiVol is rising.  Activity Level: has trended lower, 1 hr/day, currently 2.0 hr/day.    Spoke to pt. Pt denies increased shortness of breath, edema or weight gain. Pt. Reports increased fatigue for approx. 6 months. Last f/u with Dr. Sarkar 9/2019. Reviewed with Dr. Sarkar, per Dr. Sarkar schedule MD visit. Appt. Scheduled in [place] 11/18. Mrs. Valencia notified of plan of care and verbalized understanding

## 2020-11-18 ENCOUNTER — OFFICE VISIT (OUTPATIENT)
Dept: CARDIOLOGY | Facility: CLINIC | Age: 75
End: 2020-11-18
Payer: MEDICARE

## 2020-11-18 ENCOUNTER — LAB VISIT (OUTPATIENT)
Dept: FAMILY MEDICINE | Facility: CLINIC | Age: 75
End: 2020-11-18
Payer: MEDICARE

## 2020-11-18 VITALS
WEIGHT: 196 LBS | BODY MASS INDEX: 31.5 KG/M2 | OXYGEN SATURATION: 98 % | SYSTOLIC BLOOD PRESSURE: 136 MMHG | HEART RATE: 51 BPM | HEIGHT: 66 IN | DIASTOLIC BLOOD PRESSURE: 60 MMHG

## 2020-11-18 DIAGNOSIS — F17.220 TOBACCO DEPENDENCE DUE TO CHEWING TOBACCO: ICD-10-CM

## 2020-11-18 DIAGNOSIS — E66.9 OBESITY, CLASS I, BMI 30-34.9: ICD-10-CM

## 2020-11-18 DIAGNOSIS — I50.1 LEFT HEART FAILURE: Primary | ICD-10-CM

## 2020-11-18 DIAGNOSIS — Z79.899 LONG TERM CURRENT USE OF AMIODARONE: ICD-10-CM

## 2020-11-18 DIAGNOSIS — I50.1 LEFT HEART FAILURE: ICD-10-CM

## 2020-11-18 DIAGNOSIS — I25.10 CORONARY ARTERY DISEASE INVOLVING NATIVE CORONARY ARTERY OF NATIVE HEART WITHOUT ANGINA PECTORIS: ICD-10-CM

## 2020-11-18 DIAGNOSIS — E78.00 HYPERCHOLESTEROLEMIA: ICD-10-CM

## 2020-11-18 LAB
ALBUMIN SERPL BCP-MCNC: 3.9 G/DL (ref 3.5–5.2)
ALP SERPL-CCNC: 93 U/L (ref 55–135)
ALT SERPL W/O P-5'-P-CCNC: 39 U/L (ref 10–44)
ANION GAP SERPL CALC-SCNC: 11 MMOL/L (ref 8–16)
AST SERPL-CCNC: 30 U/L (ref 10–40)
BILIRUB SERPL-MCNC: 0.4 MG/DL (ref 0.1–1)
BUN SERPL-MCNC: 22 MG/DL (ref 8–23)
CALCIUM SERPL-MCNC: 9.5 MG/DL (ref 8.7–10.5)
CHLORIDE SERPL-SCNC: 105 MMOL/L (ref 95–110)
CHOLEST SERPL-MCNC: 124 MG/DL (ref 120–199)
CHOLEST/HDLC SERPL: 2.1 {RATIO} (ref 2–5)
CO2 SERPL-SCNC: 24 MMOL/L (ref 23–29)
CREAT SERPL-MCNC: 1 MG/DL (ref 0.5–1.4)
EST. GFR  (AFRICAN AMERICAN): >60 ML/MIN/1.73 M^2
EST. GFR  (NON AFRICAN AMERICAN): >60 ML/MIN/1.73 M^2
GLUCOSE SERPL-MCNC: 60 MG/DL (ref 70–110)
HDLC SERPL-MCNC: 60 MG/DL (ref 40–75)
HDLC SERPL: 48.4 % (ref 20–50)
HGB BLD-MCNC: 13 G/DL (ref 14–18)
LDLC SERPL CALC-MCNC: 56.8 MG/DL (ref 63–159)
NONHDLC SERPL-MCNC: 64 MG/DL
POTASSIUM SERPL-SCNC: 4.6 MMOL/L (ref 3.5–5.1)
PROT SERPL-MCNC: 7.2 G/DL (ref 6–8.4)
SODIUM SERPL-SCNC: 140 MMOL/L (ref 136–145)
TRIGL SERPL-MCNC: 36 MG/DL (ref 30–150)
TSH SERPL DL<=0.005 MIU/L-ACNC: 3.45 UIU/ML (ref 0.4–4)

## 2020-11-18 PROCEDURE — 80061 LIPID PANEL: CPT

## 2020-11-18 PROCEDURE — 36415 PR COLLECTION VENOUS BLOOD,VENIPUNCTURE: ICD-10-PCS | Mod: S$GLB,,, | Performed by: INTERNAL MEDICINE

## 2020-11-18 PROCEDURE — 84443 ASSAY THYROID STIM HORMONE: CPT

## 2020-11-18 PROCEDURE — 84480 ASSAY TRIIODOTHYRONINE (T3): CPT

## 2020-11-18 PROCEDURE — 36415 COLL VENOUS BLD VENIPUNCTURE: CPT | Mod: S$GLB,,, | Performed by: INTERNAL MEDICINE

## 2020-11-18 PROCEDURE — 85018 HEMOGLOBIN: CPT

## 2020-11-18 PROCEDURE — 84436 ASSAY OF TOTAL THYROXINE: CPT

## 2020-11-18 PROCEDURE — 99214 OFFICE O/P EST MOD 30 MIN: CPT | Mod: S$GLB,,, | Performed by: INTERNAL MEDICINE

## 2020-11-18 PROCEDURE — 36415 COLL VENOUS BLD VENIPUNCTURE: CPT

## 2020-11-18 PROCEDURE — 99214 PR OFFICE/OUTPT VISIT, EST, LEVL IV, 30-39 MIN: ICD-10-PCS | Mod: S$GLB,,, | Performed by: INTERNAL MEDICINE

## 2020-11-18 PROCEDURE — 80053 COMPREHEN METABOLIC PANEL: CPT

## 2020-11-18 RX ORDER — AMIODARONE HYDROCHLORIDE 200 MG/1
200 TABLET ORAL 2 TIMES DAILY
Qty: 180 TABLET | Refills: 1 | OUTPATIENT
Start: 2020-11-18

## 2020-11-18 RX ORDER — AMIODARONE HYDROCHLORIDE 200 MG/1
TABLET ORAL
Qty: 180 TABLET | Refills: 0 | Status: SHIPPED | OUTPATIENT
Start: 2020-11-18 | End: 2021-03-19 | Stop reason: SDUPTHER

## 2020-11-18 RX ORDER — B-COMPLEX WITH VITAMIN C
1 TABLET ORAL DAILY
COMMUNITY

## 2020-11-18 RX ORDER — ZINC GLUCONATE 50 MG
50 TABLET ORAL DAILY
COMMUNITY

## 2020-11-18 RX ORDER — SACUBITRIL AND VALSARTAN 24; 26 MG/1; MG/1
1 TABLET, FILM COATED ORAL 2 TIMES DAILY
Qty: 180 TABLET | Refills: 0 | Status: SHIPPED | OUTPATIENT
Start: 2020-11-18 | End: 2021-01-07 | Stop reason: SDUPTHER

## 2020-11-18 NOTE — PROGRESS NOTES
Subjective:    Patient ID:  Moiz Valencia is a 75 y.o. male who presents for Fatigue, Chills (temp sensitivity), Coronary Artery Disease, Heart Problem, and Congestive Heart Failure        HPI  NO F/U SINCE 9/2019, NO LABS, DOING OK, MORE FLUIDS ON AICD CHECK, HAS BEEN TAKING ENTRESTO ONCE/D.  RECENT OPTIVOL INTERROGATION OF HIS DEVICE SHOWED SLIGHT INCREASE IN VOLUME, DENIES CHEST PAIN OR SIGNIFICANT SHORTNESS OF BREATH NO TIA TYPE SYMPTOMS NO NEAR-SYNCOPE SEE ROS    Past Medical History:   Diagnosis Date    Acute coronary syndrome     CAD (coronary artery disease)     Cardiomyopathy     Carotid artery occlusion     CHF (congestive heart failure)     Diabetes mellitus, type 2     H/O cataract removal with insertion of prosthetic lens     bilat    Heart murmur     Hypercholesteremia     Hypertension     Old MI (myocardial infarction)     Pneumonia     PUD (peptic ulcer disease)     Sleep apnea     Valvular regurgitation      Past Surgical History:   Procedure Laterality Date    CARDIAC PACEMAKER PLACEMENT      CARDIAC SURGERY      pt states that he had 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    CHOLECYSTECTOMY      COLONOSCOPY      CORONARY ANGIOPLASTY      CORONARY ARTERY BYPASS GRAFT      HERNIA REPAIR      HIP SURGERY Right 07/05/2018    HIP SURGERY Left 11/26/2018    INCISIONAL HERNIA REPAIR  06/05/2017    LAPAROSCOPIC CHOLECYSTECTOMY N/A 9/4/2019    Procedure: CHOLECYSTECTOMY-LAPAROSCOPIC;  Surgeon: Aditya Zeng MD;  Location: [facility];  Service: General;  Laterality: N/A;    left ventricular  aneurysm repair       TONSILLECTOMY       Family History   Problem Relation Age of Onset    Diabetes Mother     Heart disease Mother     No Known Problems Father      Social History     Socioeconomic History    Marital status:      Spouse name: Not on file    Number of children: Not on file    Years of education: Not on file    Highest education level: Not on file    Occupational History    Not on file   Social Needs    Financial resource strain: Not on file    Food insecurity     Worry: Not on file     Inability: Not on file    Transportation needs     Medical: Not on file     Non-medical: Not on file   Tobacco Use    Smoking status: Former Smoker     Quit date: 1984     Years since quittin.4    Smokeless tobacco: Former User     Types: Chew     Quit date: 2018   Substance and Sexual Activity    Alcohol use: No    Drug use: No    Sexual activity: Not on file   Lifestyle    Physical activity     Days per week: Not on file     Minutes per session: Not on file    Stress: Not on file   Relationships    Social connections     Talks on phone: Not on file     Gets together: Not on file     Attends Mandaeism service: Not on file     Active member of club or organization: Not on file     Attends meetings of clubs or organizations: Not on file     Relationship status: Not on file   Other Topics Concern    Not on file   Social History Narrative    Not on file       Review of patient's allergies indicates:  No Known Allergies    Current Outpatient Medications:     allopurinol (ZYLOPRIM) 300 MG tablet, Take 300 mg by mouth once daily., Disp: , Rfl:     amiodarone (PACERONE) 200 MG Tab, TAKE 1 TABLET(200 MG) BY MOUTH TWICE DAILY, Disp: 180 tablet, Rfl: 0    aspirin (ECOTRIN) 81 MG EC tablet, Take 1 tablet (81 mg total) by mouth once daily., Disp: , Rfl: 0    atorvastatin (LIPITOR) 80 MG tablet, TAKE 1 TABLET(80 MG) BY MOUTH EVERY EVENING, Disp: 90 tablet, Rfl: 2    B-complex with vitamin C (Z-BEC OR EQUIV) tablet, Take 1 tablet by mouth once daily., Disp: , Rfl:     blood sugar diagnostic Strp, To check BG 2 times daily, to use with insurance preferred meter, Disp: 100 each, Rfl: 3    celecoxib (CELEBREX) 100 MG capsule, Take 100 mg by mouth 2 (two) times daily., Disp: , Rfl:     donepezil (ARICEPT) 10 MG tablet, Take 10 mg by mouth once daily., Disp: ,  Rfl:     DULoxetine (CYMBALTA) 20 MG capsule, Take 60 mg by mouth once daily. , Disp: , Rfl:     FLUZONE HIGH-DOSE 2019-20, PF, 180 mcg/0.5 mL Syrg, ADM 0.5ML IM UTD, Disp: , Rfl: 0    gabapentin (NEURONTIN) 100 MG capsule, Take 1 capsule (100 mg total) by mouth 2 (two) times daily. (Patient taking differently: Take 100 mg by mouth once daily. ), Disp: , Rfl:     insulin detemir U-100 (LEVEMIR) 100 unit/mL injection, Inject 10 Units into the skin every evening., Disp: , Rfl:     lancets Misc, To check BG 2 times daily, to use with insurance preferred meter, Disp: 100 each, Rfl: 3    levothyroxine (SYNTHROID) 50 MCG tablet, TAKE 1 TABLET(50 MCG) BY MOUTH EVERY DAY, Disp: 90 tablet, Rfl: 2    magnesium oxide (MAG-OX) 400 mg tablet, Take one tablet daily.  night time, Disp: , Rfl: 0    metformin (GLUCOPHAGE) 1000 MG tablet, Take 1,000 mg by mouth 2 (two) times daily with meals., Disp: , Rfl:     mupirocin calcium 2% (BACTROBAN) 2 % cream, Apply to affected area 3 times daily, Disp: 30 g, Rfl: 0    oxybutynin (DITROPAN-XL) 5 MG TR24, TAKE 1 TABLET(5 MG) BY MOUTH EVERY DAY, Disp: 90 tablet, Rfl: 1    sacubitriL-valsartan (ENTRESTO) 24-26 mg per tablet, Take 1 tablet by mouth 2 (two) times daily., Disp: 180 tablet, Rfl: 0    zinc gluconate 50 mg tablet, Take 50 mg by mouth once daily., Disp: , Rfl:     blood-glucose meter kit, To check BG 2 times daily, to use with insurance preferred meter, Disp: 1 each, Rfl: 0    Review of Systems   Constitution: Positive for malaise/fatigue. Negative for chills, diaphoresis, fever and night sweats.   HENT: Negative for congestion and nosebleeds.    Eyes: Negative for blurred vision and visual disturbance.   Cardiovascular: Negative for chest pain, claudication, cyanosis, dyspnea on exertion (MILD/), irregular heartbeat, leg swelling, near-syncope, orthopnea, palpitations, paroxysmal nocturnal dyspnea and syncope.   Respiratory: Negative for cough, hemoptysis, shortness of  "breath and wheezing.    Endocrine: Positive for cold intolerance. Negative for heat intolerance and polyuria.        BG BETTER   Hematologic/Lymphatic: Negative for adenopathy. Does not bruise/bleed easily.   Skin: Negative for itching and rash.   Musculoskeletal: Positive for falls (WORKING IN YARD,). Negative for arthritis (CHRONIC) and back pain (CHRONIC).        SLOW GAIT   Gastrointestinal: Negative for abdominal pain, change in bowel habit, dysphagia, jaundice and melena.   Genitourinary: Negative for dysuria, flank pain and frequency.   Neurological: Negative for brief paralysis, dizziness, focal weakness, light-headedness, loss of balance (SLIGHT) and weakness.   Psychiatric/Behavioral: Negative for depression and memory loss.   Allergic/Immunologic: Negative for persistent infections.        Objective:      Vitals:    11/18/20 1055   BP: 136/60   Pulse: (!) 51   SpO2: 98%   Weight: 88.9 kg (195 lb 15.8 oz)   Height: 5' 6" (1.676 m)   PainSc: 0-No pain     Body mass index is 31.63 kg/m².    Physical Exam   Constitutional: He is oriented to person, place, and time. He appears well-developed and well-nourished. He is active.   OVERWEIGHT   HENT:   Head: Normocephalic and atraumatic.   Mouth/Throat: Oropharynx is clear and moist and mucous membranes are normal.   Eyes: Conjunctivae and EOM are normal.   Neck: Neck supple. Normal carotid pulses, no hepatojugular reflux and no JVD present. Carotid bruit is not present.   Cardiovascular: Normal rate and regular rhythm.  No extrasystoles are present. PMI is not displaced. Exam reveals no gallop, no distant heart sounds, no friction rub and no midsystolic click.   Murmur heard.  High-pitched holosystolic murmur is present with a grade of 1/6 at the apex.   Systolic murmur is also present at the lower right sternal border.  Pulses:       Carotid pulses are 2+ on the right side and 2+ on the left side.       Radial pulses are 2+ on the right side and 2+ on the left " side.        Femoral pulses are 2+ on the right side and 2+ on the left side.       Posterior tibial pulses are 2+ on the right side and 2+ on the left side.   Pulmonary/Chest: Effort normal. He has no decreased breath sounds. He has no wheezes. He has no rhonchi. He has no rales.   STERNOTOMY SCAR   Abdominal: Soft. Bowel sounds are normal. There is no hepatosplenomegaly. There is no abdominal tenderness. There is no CVA tenderness.   Musculoskeletal: Normal range of motion.         General: No deformity or edema.      Comments: SLOW GAIT   Lymphadenopathy:     He has no cervical adenopathy.   Neurological: He is alert and oriented to person, place, and time. He has normal strength. He displays no tremor. No cranial nerve deficit.   Skin: Skin is warm and dry. No rash noted. No cyanosis.   Psychiatric: He has a normal mood and affect. His speech is normal and behavior is normal.               ..    Chemistry        Component Value Date/Time     11/18/2020 1236    K 4.6 11/18/2020 1236     11/18/2020 1236    CO2 24 11/18/2020 1236    BUN 22 11/18/2020 1236    CREATININE 1.0 11/18/2020 1236    GLU 60 (L) 11/18/2020 1236        Component Value Date/Time    CALCIUM 9.5 11/18/2020 1236    ALKPHOS 93 11/18/2020 1236    AST 30 11/18/2020 1236    AST 15 (L) 11/10/2015 0819    ALT 39 11/18/2020 1236    BILITOT 0.4 11/18/2020 1236    ESTGFRAFRICA >60.0 11/18/2020 1236    EGFRNONAA >60.0 11/18/2020 1236            ..  Lab Results   Component Value Date    CHOL 124 11/18/2020    CHOL 180 03/25/2019    CHOL 192 08/02/2017     Lab Results   Component Value Date    HDL 60 11/18/2020    HDL 52 03/25/2019    HDL 50 08/02/2017     Lab Results   Component Value Date    LDLCALC 56.8 (L) 11/18/2020    LDLCALC 104.4 03/25/2019    LDLCALC 122.0 08/02/2017     Lab Results   Component Value Date    TRIG 36 11/18/2020    TRIG 118 03/25/2019    TRIG 100 08/02/2017     Lab Results   Component Value Date    CHOLHDL 48.4 11/18/2020     CHOLHDL 28.9 03/25/2019    CHOLHDL 26.0 08/02/2017     ..  Lab Results   Component Value Date    WBC 10.41 06/07/2018    HGB 13.0 (L) 11/18/2020    HCT 40.0 03/25/2019    MCV 95 06/07/2018     06/07/2018       Test(s) Reviewed  I have reviewed the following in detail:  [] Stress test   [] Angiography   [] Echocardiogram   [] Labs   [x] Other:       Assessment:         ICD-10-CM ICD-9-CM   1. Left heart failure  I50.1 428.1   2. Long term current use of amiodarone  Z79.899 V58.69   3. Coronary artery disease involving native coronary artery of native heart without angina pectoris  I25.10 414.01   4. Tobacco dependence due to chewing tobacco  F17.220 305.1   5. Obesity, Class I, BMI 30-34.9  E66.9 278.00   6. Hypercholesterolemia  E78.00 272.0     Problem List Items Addressed This Visit        Cardiac/Vascular    Coronary artery disease involving native coronary artery of native heart without angina pectoris    Relevant Orders    Comprehensive Metabolic Panel (Completed)    Hemoglobin (Completed)    Left heart failure - Primary    Relevant Orders    Comprehensive Metabolic Panel (Completed)    Hemoglobin (Completed)    Long term current use of amiodarone    Relevant Orders    Comprehensive Metabolic Panel (Completed)    Hemoglobin (Completed)    Lipid Panel (Completed)    TSH (Completed)    T3 (Completed)    T4 (Completed)    Hypercholesterolemia    Relevant Orders    Comprehensive Metabolic Panel (Completed)    Hemoglobin (Completed)    Lipid Panel (Completed)       Endocrine    Obesity, Class I, BMI 30-34.9    Relevant Orders    Comprehensive Metabolic Panel (Completed)    Hemoglobin (Completed)       Other    Tobacco dependence due to chewing tobacco    Relevant Orders    Comprehensive Metabolic Panel (Completed)    Hemoglobin (Completed)           Plan:     LABS SOON, INCREASE ENTRESTO TO BID, TOBACCO CESSATION COUNSELING STATES CUT DOWN, DAILY WEIGHT 2 LB PER DAY 5 LB PER WEEK RULE EXPLAINED,ALL OTHER  CV CLINICALLY STABLE, NO ANGINA, CLASS 2 HF, NO TIA, NO CLINICAL ARRHYTHMIA,CONTINUE CURRENT MEDS, EDUCATION, DIET, EXERCISE AS MUCH BYPASS POSSIBLE, WEIGHT LOSS, RETURN TO CLINIC IN 3 MO, DISCUSSED PLAN WITH THE PATIENT AND HIS WIFE      Left heart failure  -     Comprehensive Metabolic Panel; Future; Expected date: 11/18/2020  -     Hemoglobin; Future; Expected date: 11/18/2020    Long term current use of amiodarone  -     Comprehensive Metabolic Panel; Future; Expected date: 11/18/2020  -     Hemoglobin; Future; Expected date: 11/18/2020  -     Lipid Panel; Future; Expected date: 11/18/2020  -     TSH; Future; Expected date: 11/18/2020  -     T3; Future; Expected date: 11/18/2020  -     T4; Future; Expected date: 11/18/2020    Coronary artery disease involving native coronary artery of native heart without angina pectoris  -     Comprehensive Metabolic Panel; Future; Expected date: 11/18/2020  -     Hemoglobin; Future; Expected date: 11/18/2020    Tobacco dependence due to chewing tobacco  -     Comprehensive Metabolic Panel; Future; Expected date: 11/18/2020  -     Hemoglobin; Future; Expected date: 11/18/2020    Obesity, Class I, BMI 30-34.9  -     Comprehensive Metabolic Panel; Future; Expected date: 11/18/2020  -     Hemoglobin; Future; Expected date: 11/18/2020    Hypercholesterolemia  -     Comprehensive Metabolic Panel; Future; Expected date: 11/18/2020  -     Hemoglobin; Future; Expected date: 11/18/2020  -     Lipid Panel; Future; Expected date: 11/18/2020    Other orders  -     sacubitriL-valsartan (ENTRESTO) 24-26 mg per tablet; Take 1 tablet by mouth 2 (two) times daily.  Dispense: 180 tablet; Refill: 0  -     amiodarone (PACERONE) 200 MG Tab; TAKE 1 TABLET(200 MG) BY MOUTH TWICE DAILY  Dispense: 180 tablet; Refill: 0    RTC Low level/low impact aerobic exercise 5x's/wk. Heart healthy diet and risk factor modification.    See labs and med orders.    Aerobic exercise 5x's/wk. Heart healthy diet and  risk factor modification.    See labs and med orders.

## 2020-11-19 LAB
T3 SERPL-MCNC: 55 NG/DL (ref 60–180)
T4 SERPL-MCNC: 10 UG/DL (ref 4.5–11.5)

## 2020-11-25 ENCOUNTER — CLINICAL SUPPORT (OUTPATIENT)
Dept: CARDIOLOGY | Facility: CLINIC | Age: 75
End: 2020-11-25
Payer: MEDICARE

## 2020-11-25 DIAGNOSIS — Z95.810 PRESENCE OF AUTOMATIC (IMPLANTABLE) CARDIAC DEFIBRILLATOR: ICD-10-CM

## 2020-11-25 PROCEDURE — 93297 CARDIAC DEVICE CHECK - REMOTE: ICD-10-PCS | Mod: ,,, | Performed by: INTERNAL MEDICINE

## 2020-11-25 PROCEDURE — 93297 REM INTERROG DEV EVAL ICPMS: CPT | Mod: ,,, | Performed by: INTERNAL MEDICINE

## 2020-12-28 ENCOUNTER — CLINICAL SUPPORT (OUTPATIENT)
Dept: CARDIOLOGY | Facility: CLINIC | Age: 75
End: 2020-12-28
Payer: MEDICARE

## 2020-12-28 DIAGNOSIS — Z95.810 PRESENCE OF AUTOMATIC (IMPLANTABLE) CARDIAC DEFIBRILLATOR: ICD-10-CM

## 2020-12-28 PROCEDURE — 93295 DEV INTERROG REMOTE 1/2/MLT: CPT | Mod: ,,, | Performed by: INTERNAL MEDICINE

## 2020-12-28 PROCEDURE — 93295 CARDIAC DEVICE CHECK - REMOTE: ICD-10-PCS | Mod: ,,, | Performed by: INTERNAL MEDICINE

## 2021-01-07 DIAGNOSIS — I50.1 LEFT HEART FAILURE: Primary | ICD-10-CM

## 2021-01-07 RX ORDER — SACUBITRIL AND VALSARTAN 24; 26 MG/1; MG/1
1 TABLET, FILM COATED ORAL 2 TIMES DAILY
Qty: 180 TABLET | Refills: 1 | Status: SHIPPED | OUTPATIENT
Start: 2021-01-07 | End: 2021-09-25

## 2021-01-28 ENCOUNTER — TELEPHONE (OUTPATIENT)
Dept: CARDIOLOGY | Facility: CLINIC | Age: 76
End: 2021-01-28

## 2021-02-17 ENCOUNTER — TELEPHONE (OUTPATIENT)
Dept: CARDIOLOGY | Facility: CLINIC | Age: 76
End: 2021-02-17

## 2021-02-17 DIAGNOSIS — I50.1 LEFT HEART FAILURE: ICD-10-CM

## 2021-02-17 DIAGNOSIS — Z95.810 PRESENCE OF AUTOMATIC (IMPLANTABLE) CARDIAC DEFIBRILLATOR: Primary | ICD-10-CM

## 2021-03-01 ENCOUNTER — TELEPHONE (OUTPATIENT)
Dept: CARDIOLOGY | Facility: CLINIC | Age: 76
End: 2021-03-01

## 2021-03-02 ENCOUNTER — TELEPHONE (OUTPATIENT)
Dept: CARDIOLOGY | Facility: CLINIC | Age: 76
End: 2021-03-02

## 2021-03-14 ENCOUNTER — CLINICAL SUPPORT (OUTPATIENT)
Dept: CARDIOLOGY | Facility: CLINIC | Age: 76
End: 2021-03-14
Payer: MEDICARE

## 2021-03-14 DIAGNOSIS — Z95.810 PRESENCE OF AUTOMATIC (IMPLANTABLE) CARDIAC DEFIBRILLATOR: ICD-10-CM

## 2021-03-14 PROCEDURE — 93297 CARDIAC DEVICE CHECK - REMOTE: ICD-10-PCS | Mod: ,,, | Performed by: INTERNAL MEDICINE

## 2021-03-14 PROCEDURE — 93297 REM INTERROG DEV EVAL ICPMS: CPT | Mod: ,,, | Performed by: INTERNAL MEDICINE

## 2021-03-15 ENCOUNTER — LAB VISIT (OUTPATIENT)
Dept: FAMILY MEDICINE | Facility: CLINIC | Age: 76
End: 2021-03-15
Payer: MEDICARE

## 2021-03-15 ENCOUNTER — CLINICAL SUPPORT (OUTPATIENT)
Dept: FAMILY MEDICINE | Facility: CLINIC | Age: 76
End: 2021-03-15
Payer: MEDICARE

## 2021-03-15 DIAGNOSIS — E78.00 HYPERCHOLESTEROLEMIA: ICD-10-CM

## 2021-03-15 DIAGNOSIS — E11.8 TYPE 2 DIABETES MELLITUS WITH COMPLICATION, WITH LONG-TERM CURRENT USE OF INSULIN: ICD-10-CM

## 2021-03-15 DIAGNOSIS — Z79.4 TYPE 2 DIABETES MELLITUS WITH COMPLICATION, WITH LONG-TERM CURRENT USE OF INSULIN: ICD-10-CM

## 2021-03-15 DIAGNOSIS — Z79.899 ON AMIODARONE THERAPY: ICD-10-CM

## 2021-03-15 PROCEDURE — 83036 HEMOGLOBIN GLYCOSYLATED A1C: CPT | Performed by: FAMILY MEDICINE

## 2021-03-15 PROCEDURE — 82550 ASSAY OF CK (CPK): CPT | Performed by: FAMILY MEDICINE

## 2021-03-15 PROCEDURE — 80061 LIPID PANEL: CPT | Performed by: FAMILY MEDICINE

## 2021-03-15 PROCEDURE — 84443 ASSAY THYROID STIM HORMONE: CPT | Performed by: FAMILY MEDICINE

## 2021-03-15 PROCEDURE — 36415 COLL VENOUS BLD VENIPUNCTURE: CPT | Performed by: FAMILY MEDICINE

## 2021-03-15 PROCEDURE — 82570 ASSAY OF URINE CREATININE: CPT | Performed by: FAMILY MEDICINE

## 2021-03-15 PROCEDURE — 82043 UR ALBUMIN QUANTITATIVE: CPT | Performed by: FAMILY MEDICINE

## 2021-03-15 PROCEDURE — 80053 COMPREHEN METABOLIC PANEL: CPT | Performed by: FAMILY MEDICINE

## 2021-03-16 ENCOUNTER — TELEPHONE (OUTPATIENT)
Dept: CARDIOLOGY | Facility: CLINIC | Age: 76
End: 2021-03-16

## 2021-03-16 LAB
ALBUMIN SERPL BCP-MCNC: 3.8 G/DL (ref 3.5–5.2)
ALBUMIN/CREAT UR: 8.3 UG/MG (ref 0–30)
ALP SERPL-CCNC: 73 U/L (ref 55–135)
ALT SERPL W/O P-5'-P-CCNC: 34 U/L (ref 10–44)
ANION GAP SERPL CALC-SCNC: 9 MMOL/L (ref 8–16)
AST SERPL-CCNC: 28 U/L (ref 10–40)
BILIRUB SERPL-MCNC: 0.4 MG/DL (ref 0.1–1)
BUN SERPL-MCNC: 21 MG/DL (ref 8–23)
CALCIUM SERPL-MCNC: 9.1 MG/DL (ref 8.7–10.5)
CHLORIDE SERPL-SCNC: 105 MMOL/L (ref 95–110)
CHOLEST SERPL-MCNC: 113 MG/DL (ref 120–199)
CHOLEST/HDLC SERPL: 2.3 {RATIO} (ref 2–5)
CO2 SERPL-SCNC: 25 MMOL/L (ref 23–29)
CREAT SERPL-MCNC: 1.3 MG/DL (ref 0.5–1.4)
CREAT UR-MCNC: 253 MG/DL (ref 23–375)
EST. GFR  (AFRICAN AMERICAN): >60 ML/MIN/1.73 M^2
EST. GFR  (NON AFRICAN AMERICAN): 53 ML/MIN/1.73 M^2
ESTIMATED AVG GLUCOSE: 117 MG/DL (ref 68–131)
GLUCOSE SERPL-MCNC: 101 MG/DL (ref 70–110)
HBA1C MFR BLD: 5.7 % (ref 4–5.6)
HDLC SERPL-MCNC: 50 MG/DL (ref 40–75)
HDLC SERPL: 44.2 % (ref 20–50)
LDLC SERPL CALC-MCNC: 52.8 MG/DL (ref 63–159)
MICROALBUMIN UR DL<=1MG/L-MCNC: 21 UG/ML
NONHDLC SERPL-MCNC: 63 MG/DL
POTASSIUM SERPL-SCNC: 5 MMOL/L (ref 3.5–5.1)
PROT SERPL-MCNC: 6.9 G/DL (ref 6–8.4)
SODIUM SERPL-SCNC: 139 MMOL/L (ref 136–145)
TRIGL SERPL-MCNC: 51 MG/DL (ref 30–150)
TSH SERPL DL<=0.005 MIU/L-ACNC: 2.12 UIU/ML (ref 0.4–4)

## 2021-03-17 ENCOUNTER — TELEPHONE (OUTPATIENT)
Dept: CARDIOLOGY | Facility: CLINIC | Age: 76
End: 2021-03-17

## 2021-03-17 LAB — CK SERPL-CCNC: 50 U/L (ref 20–200)

## 2021-03-18 ENCOUNTER — TELEPHONE (OUTPATIENT)
Dept: CARDIOLOGY | Facility: CLINIC | Age: 76
End: 2021-03-18

## 2021-03-19 ENCOUNTER — TELEPHONE (OUTPATIENT)
Dept: CARDIOLOGY | Facility: CLINIC | Age: 76
End: 2021-03-19

## 2021-03-19 DIAGNOSIS — Z95.810 PRESENCE OF AUTOMATIC (IMPLANTABLE) CARDIAC DEFIBRILLATOR: Primary | ICD-10-CM

## 2021-03-19 DIAGNOSIS — I21.9 MYOCARDIAL INFARCTION, UNSPECIFIED MI TYPE, UNSPECIFIED ARTERY: ICD-10-CM

## 2021-03-20 RX ORDER — AMIODARONE HYDROCHLORIDE 200 MG/1
TABLET ORAL
Qty: 180 TABLET | Refills: 0 | Status: SHIPPED | OUTPATIENT
Start: 2021-03-20 | End: 2021-05-06 | Stop reason: DRUGHIGH

## 2021-03-28 ENCOUNTER — CLINICAL SUPPORT (OUTPATIENT)
Dept: CARDIOLOGY | Facility: CLINIC | Age: 76
End: 2021-03-28
Payer: MEDICARE

## 2021-03-28 DIAGNOSIS — Z95.810 PRESENCE OF AUTOMATIC (IMPLANTABLE) CARDIAC DEFIBRILLATOR: ICD-10-CM

## 2021-03-28 PROCEDURE — 93295 CARDIAC DEVICE CHECK - REMOTE: ICD-10-PCS | Mod: ,,, | Performed by: INTERNAL MEDICINE

## 2021-03-28 PROCEDURE — 93295 DEV INTERROG REMOTE 1/2/MLT: CPT | Mod: ,,, | Performed by: INTERNAL MEDICINE

## 2021-04-06 ENCOUNTER — CLINICAL SUPPORT (OUTPATIENT)
Dept: CARDIOLOGY | Facility: CLINIC | Age: 76
End: 2021-04-06
Attending: INTERNAL MEDICINE
Payer: MEDICARE

## 2021-04-06 DIAGNOSIS — I50.1 LEFT HEART FAILURE: ICD-10-CM

## 2021-04-06 DIAGNOSIS — Z95.810 PRESENCE OF AUTOMATIC (IMPLANTABLE) CARDIAC DEFIBRILLATOR: ICD-10-CM

## 2021-05-05 ENCOUNTER — TELEPHONE (OUTPATIENT)
Dept: CARDIOLOGY | Facility: CLINIC | Age: 76
End: 2021-05-05

## 2021-05-06 ENCOUNTER — OFFICE VISIT (OUTPATIENT)
Dept: CARDIOLOGY | Facility: CLINIC | Age: 76
End: 2021-05-06
Payer: MEDICARE

## 2021-05-06 VITALS
BODY MASS INDEX: 32.64 KG/M2 | HEART RATE: 53 BPM | SYSTOLIC BLOOD PRESSURE: 124 MMHG | DIASTOLIC BLOOD PRESSURE: 61 MMHG | HEIGHT: 66 IN | WEIGHT: 203.06 LBS

## 2021-05-06 DIAGNOSIS — E66.9 OBESITY, CLASS I, BMI 30-34.9: Chronic | ICD-10-CM

## 2021-05-06 DIAGNOSIS — Z79.899 LONG TERM CURRENT USE OF AMIODARONE: Chronic | ICD-10-CM

## 2021-05-06 DIAGNOSIS — F17.220 TOBACCO DEPENDENCE DUE TO CHEWING TOBACCO: Chronic | ICD-10-CM

## 2021-05-06 DIAGNOSIS — I34.0 NON-RHEUMATIC MITRAL REGURGITATION: Chronic | ICD-10-CM

## 2021-05-06 DIAGNOSIS — R09.89 LABILE BLOOD PRESSURE: Chronic | ICD-10-CM

## 2021-05-06 DIAGNOSIS — I50.1 LEFT HEART FAILURE: Primary | Chronic | ICD-10-CM

## 2021-05-06 PROCEDURE — 99214 OFFICE O/P EST MOD 30 MIN: CPT | Mod: S$PBB,,, | Performed by: INTERNAL MEDICINE

## 2021-05-06 PROCEDURE — 99214 OFFICE O/P EST MOD 30 MIN: CPT | Mod: PBBFAC,PO | Performed by: INTERNAL MEDICINE

## 2021-05-06 PROCEDURE — 99214 PR OFFICE/OUTPT VISIT, EST, LEVL IV, 30-39 MIN: ICD-10-PCS | Mod: S$PBB,,, | Performed by: INTERNAL MEDICINE

## 2021-05-06 PROCEDURE — 99999 PR PBB SHADOW E&M-EST. PATIENT-LVL IV: ICD-10-PCS | Mod: PBBFAC,,, | Performed by: INTERNAL MEDICINE

## 2021-05-06 PROCEDURE — 99999 PR PBB SHADOW E&M-EST. PATIENT-LVL IV: CPT | Mod: PBBFAC,,, | Performed by: INTERNAL MEDICINE

## 2021-05-06 RX ORDER — AMIODARONE HYDROCHLORIDE 200 MG/1
200 TABLET ORAL DAILY
Qty: 30 TABLET | Refills: 5 | Status: SHIPPED | OUTPATIENT
Start: 2021-05-06 | End: 2022-07-17

## 2021-10-08 ENCOUNTER — CLINICAL SUPPORT (OUTPATIENT)
Dept: CARDIOLOGY | Facility: HOSPITAL | Age: 76
End: 2021-10-08
Payer: MEDICARE

## 2021-10-08 DIAGNOSIS — Z95.810 PRESENCE OF AUTOMATIC (IMPLANTABLE) CARDIAC DEFIBRILLATOR: ICD-10-CM

## 2021-11-16 ENCOUNTER — OFFICE VISIT (OUTPATIENT)
Dept: FAMILY MEDICINE | Facility: CLINIC | Age: 76
End: 2021-11-16
Payer: MEDICARE

## 2021-11-16 VITALS
WEIGHT: 196.63 LBS | SYSTOLIC BLOOD PRESSURE: 118 MMHG | DIASTOLIC BLOOD PRESSURE: 54 MMHG | HEIGHT: 66 IN | HEART RATE: 75 BPM | BODY MASS INDEX: 31.6 KG/M2 | OXYGEN SATURATION: 93 %

## 2021-11-16 DIAGNOSIS — D50.8 IRON DEFICIENCY ANEMIA SECONDARY TO INADEQUATE DIETARY IRON INTAKE: ICD-10-CM

## 2021-11-16 DIAGNOSIS — E53.8 B12 DEFICIENCY: ICD-10-CM

## 2021-11-16 DIAGNOSIS — Z79.4 TYPE 2 DIABETES MELLITUS WITH FOOT ULCER, WITH LONG-TERM CURRENT USE OF INSULIN: ICD-10-CM

## 2021-11-16 DIAGNOSIS — E11.621 TYPE 2 DIABETES MELLITUS WITH FOOT ULCER, WITH LONG-TERM CURRENT USE OF INSULIN: ICD-10-CM

## 2021-11-16 DIAGNOSIS — R53.83 FATIGUE, UNSPECIFIED TYPE: Primary | ICD-10-CM

## 2021-11-16 DIAGNOSIS — L97.509 TYPE 2 DIABETES MELLITUS WITH FOOT ULCER, WITH LONG-TERM CURRENT USE OF INSULIN: ICD-10-CM

## 2021-11-16 PROBLEM — D50.9 IRON DEFICIENCY ANEMIA: Status: ACTIVE | Noted: 2021-11-16

## 2021-11-16 PROBLEM — Z96.641 STATUS POST TOTAL HIP REPLACEMENT, RIGHT: Status: ACTIVE | Noted: 2018-07-09

## 2021-11-16 PROCEDURE — 99214 OFFICE O/P EST MOD 30 MIN: CPT | Mod: S$GLB,,, | Performed by: STUDENT IN AN ORGANIZED HEALTH CARE EDUCATION/TRAINING PROGRAM

## 2021-11-16 PROCEDURE — 99214 PR OFFICE/OUTPT VISIT, EST, LEVL IV, 30-39 MIN: ICD-10-PCS | Mod: S$GLB,,, | Performed by: STUDENT IN AN ORGANIZED HEALTH CARE EDUCATION/TRAINING PROGRAM

## 2021-11-19 ENCOUNTER — LAB VISIT (OUTPATIENT)
Dept: LAB | Facility: CLINIC | Age: 76
End: 2021-11-19
Payer: MEDICARE

## 2021-11-19 DIAGNOSIS — E11.621 TYPE 2 DIABETES MELLITUS WITH FOOT ULCER, WITH LONG-TERM CURRENT USE OF INSULIN: ICD-10-CM

## 2021-11-19 DIAGNOSIS — D50.8 IRON DEFICIENCY ANEMIA SECONDARY TO INADEQUATE DIETARY IRON INTAKE: ICD-10-CM

## 2021-11-19 DIAGNOSIS — E53.8 B12 DEFICIENCY: ICD-10-CM

## 2021-11-19 DIAGNOSIS — Z79.4 TYPE 2 DIABETES MELLITUS WITH FOOT ULCER, WITH LONG-TERM CURRENT USE OF INSULIN: ICD-10-CM

## 2021-11-19 DIAGNOSIS — L97.509 TYPE 2 DIABETES MELLITUS WITH FOOT ULCER, WITH LONG-TERM CURRENT USE OF INSULIN: ICD-10-CM

## 2021-11-19 DIAGNOSIS — R53.83 FATIGUE, UNSPECIFIED TYPE: ICD-10-CM

## 2021-11-19 LAB
ALBUMIN SERPL BCP-MCNC: 3.6 G/DL (ref 3.5–5.2)
ALP SERPL-CCNC: 75 U/L (ref 55–135)
ALT SERPL W/O P-5'-P-CCNC: 22 U/L (ref 10–44)
ANION GAP SERPL CALC-SCNC: 11 MMOL/L (ref 8–16)
AST SERPL-CCNC: 18 U/L (ref 10–40)
BASOPHILS # BLD AUTO: 0.05 K/UL (ref 0–0.2)
BASOPHILS NFR BLD: 0.6 % (ref 0–1.9)
BILIRUB SERPL-MCNC: 0.6 MG/DL (ref 0.1–1)
BUN SERPL-MCNC: 17 MG/DL (ref 8–23)
CALCIUM SERPL-MCNC: 9.5 MG/DL (ref 8.7–10.5)
CHLORIDE SERPL-SCNC: 104 MMOL/L (ref 95–110)
CO2 SERPL-SCNC: 23 MMOL/L (ref 23–29)
CREAT SERPL-MCNC: 1 MG/DL (ref 0.5–1.4)
DIFFERENTIAL METHOD: ABNORMAL
EOSINOPHIL # BLD AUTO: 0.3 K/UL (ref 0–0.5)
EOSINOPHIL NFR BLD: 3.1 % (ref 0–8)
ERYTHROCYTE [DISTWIDTH] IN BLOOD BY AUTOMATED COUNT: 13.4 % (ref 11.5–14.5)
EST. GFR  (AFRICAN AMERICAN): >60 ML/MIN/1.73 M^2
EST. GFR  (NON AFRICAN AMERICAN): >60 ML/MIN/1.73 M^2
FERRITIN SERPL-MCNC: 83 NG/ML (ref 20–300)
GLUCOSE SERPL-MCNC: 146 MG/DL (ref 70–110)
HCT VFR BLD AUTO: 39.2 % (ref 40–54)
HGB BLD-MCNC: 12.9 G/DL (ref 14–18)
IMM GRANULOCYTES # BLD AUTO: 0.03 K/UL (ref 0–0.04)
IMM GRANULOCYTES NFR BLD AUTO: 0.3 % (ref 0–0.5)
LYMPHOCYTES # BLD AUTO: 1.8 K/UL (ref 1–4.8)
LYMPHOCYTES NFR BLD: 20.4 % (ref 18–48)
MCH RBC QN AUTO: 31.2 PG (ref 27–31)
MCHC RBC AUTO-ENTMCNC: 32.9 G/DL (ref 32–36)
MCV RBC AUTO: 95 FL (ref 82–98)
MONOCYTES # BLD AUTO: 0.7 K/UL (ref 0.3–1)
MONOCYTES NFR BLD: 8 % (ref 4–15)
NEUTROPHILS # BLD AUTO: 6 K/UL (ref 1.8–7.7)
NEUTROPHILS NFR BLD: 67.6 % (ref 38–73)
NRBC BLD-RTO: 0 /100 WBC
PLATELET # BLD AUTO: 271 K/UL (ref 150–450)
PMV BLD AUTO: 11.2 FL (ref 9.2–12.9)
POTASSIUM SERPL-SCNC: 4.1 MMOL/L (ref 3.5–5.1)
PROT SERPL-MCNC: 6.9 G/DL (ref 6–8.4)
RBC # BLD AUTO: 4.14 M/UL (ref 4.6–6.2)
SODIUM SERPL-SCNC: 138 MMOL/L (ref 136–145)
T4 FREE SERPL-MCNC: 1.12 NG/DL (ref 0.71–1.51)
TSH SERPL DL<=0.005 MIU/L-ACNC: 4.09 UIU/ML (ref 0.4–4)
VIT B12 SERPL-MCNC: 945 PG/ML (ref 210–950)
WBC # BLD AUTO: 8.89 K/UL (ref 3.9–12.7)

## 2021-11-19 PROCEDURE — 84466 ASSAY OF TRANSFERRIN: CPT | Performed by: STUDENT IN AN ORGANIZED HEALTH CARE EDUCATION/TRAINING PROGRAM

## 2021-11-19 PROCEDURE — 36415 COLL VENOUS BLD VENIPUNCTURE: CPT | Mod: ,,, | Performed by: STUDENT IN AN ORGANIZED HEALTH CARE EDUCATION/TRAINING PROGRAM

## 2021-11-19 PROCEDURE — 85025 COMPLETE CBC W/AUTO DIFF WBC: CPT | Performed by: STUDENT IN AN ORGANIZED HEALTH CARE EDUCATION/TRAINING PROGRAM

## 2021-11-19 PROCEDURE — 82728 ASSAY OF FERRITIN: CPT | Performed by: STUDENT IN AN ORGANIZED HEALTH CARE EDUCATION/TRAINING PROGRAM

## 2021-11-19 PROCEDURE — 80053 COMPREHEN METABOLIC PANEL: CPT | Performed by: STUDENT IN AN ORGANIZED HEALTH CARE EDUCATION/TRAINING PROGRAM

## 2021-11-19 PROCEDURE — 36415 PR COLLECTION VENOUS BLOOD,VENIPUNCTURE: ICD-10-PCS | Mod: ,,, | Performed by: STUDENT IN AN ORGANIZED HEALTH CARE EDUCATION/TRAINING PROGRAM

## 2021-11-19 PROCEDURE — 84439 ASSAY OF FREE THYROXINE: CPT | Performed by: STUDENT IN AN ORGANIZED HEALTH CARE EDUCATION/TRAINING PROGRAM

## 2021-11-19 PROCEDURE — 83036 HEMOGLOBIN GLYCOSYLATED A1C: CPT | Performed by: STUDENT IN AN ORGANIZED HEALTH CARE EDUCATION/TRAINING PROGRAM

## 2021-11-19 PROCEDURE — 82607 VITAMIN B-12: CPT | Performed by: STUDENT IN AN ORGANIZED HEALTH CARE EDUCATION/TRAINING PROGRAM

## 2021-11-19 PROCEDURE — 84443 ASSAY THYROID STIM HORMONE: CPT | Performed by: STUDENT IN AN ORGANIZED HEALTH CARE EDUCATION/TRAINING PROGRAM

## 2021-11-20 LAB
ESTIMATED AVG GLUCOSE: 126 MG/DL (ref 68–131)
HBA1C MFR BLD: 6 % (ref 4–5.6)
IRON SERPL-MCNC: 65 UG/DL (ref 45–160)
SATURATED IRON: 21 % (ref 20–50)
TOTAL IRON BINDING CAPACITY: 317 UG/DL (ref 250–450)
TRANSFERRIN SERPL-MCNC: 214 MG/DL (ref 200–375)

## 2021-11-22 ENCOUNTER — CLINICAL SUPPORT (OUTPATIENT)
Dept: CARDIOLOGY | Facility: HOSPITAL | Age: 76
End: 2021-11-22
Payer: MEDICARE

## 2021-11-22 DIAGNOSIS — Z95.810 PRESENCE OF AUTOMATIC (IMPLANTABLE) CARDIAC DEFIBRILLATOR: ICD-10-CM

## 2021-11-22 PROCEDURE — 93297 CARDIAC DEVICE CHECK - REMOTE: ICD-10-PCS | Mod: ,,, | Performed by: INTERNAL MEDICINE

## 2021-11-22 PROCEDURE — 93297 REM INTERROG DEV EVAL ICPMS: CPT | Mod: ,,, | Performed by: INTERNAL MEDICINE

## 2021-12-09 ENCOUNTER — TELEPHONE (OUTPATIENT)
Dept: FAMILY MEDICINE | Facility: CLINIC | Age: 76
End: 2021-12-09
Payer: MEDICARE

## 2021-12-10 ENCOUNTER — OFFICE VISIT (OUTPATIENT)
Dept: FAMILY MEDICINE | Facility: CLINIC | Age: 76
End: 2021-12-10
Payer: MEDICARE

## 2021-12-10 VITALS
OXYGEN SATURATION: 98 % | TEMPERATURE: 98 F | HEIGHT: 66 IN | HEART RATE: 77 BPM | SYSTOLIC BLOOD PRESSURE: 126 MMHG | WEIGHT: 196 LBS | RESPIRATION RATE: 18 BRPM | BODY MASS INDEX: 31.5 KG/M2 | DIASTOLIC BLOOD PRESSURE: 68 MMHG

## 2021-12-10 DIAGNOSIS — K57.90 DIVERTICULOSIS: ICD-10-CM

## 2021-12-10 DIAGNOSIS — R19.7 DIARRHEA, UNSPECIFIED TYPE: Primary | ICD-10-CM

## 2021-12-10 PROBLEM — M51.37 DEGENERATION OF LUMBAR OR LUMBOSACRAL INTERVERTEBRAL DISC: Status: ACTIVE | Noted: 2021-12-10

## 2021-12-10 PROBLEM — Z95.0 CARDIAC PACEMAKER IN SITU: Status: ACTIVE | Noted: 2021-12-10

## 2021-12-10 PROBLEM — G47.33 OBSTRUCTIVE SLEEP APNEA OF ADULT: Status: ACTIVE | Noted: 2021-12-10

## 2021-12-10 PROBLEM — E03.9 HYPOTHYROIDISM: Status: ACTIVE | Noted: 2021-12-10

## 2021-12-10 PROBLEM — G89.4 CHRONIC PAIN SYNDROME: Status: ACTIVE | Noted: 2021-12-10

## 2021-12-10 PROBLEM — M51.379 DEGENERATION OF LUMBAR OR LUMBOSACRAL INTERVERTEBRAL DISC: Status: ACTIVE | Noted: 2021-12-10

## 2021-12-10 PROBLEM — E55.9 VITAMIN D DEFICIENCY: Status: ACTIVE | Noted: 2021-12-10

## 2021-12-10 PROBLEM — I10 BENIGN ESSENTIAL HYPERTENSION: Status: ACTIVE | Noted: 2021-12-10

## 2021-12-10 PROBLEM — F03.90 DEMENTIA: Status: ACTIVE | Noted: 2021-12-10

## 2021-12-10 PROBLEM — M10.9 GOUT: Status: ACTIVE | Noted: 2021-12-10

## 2021-12-10 PROCEDURE — 99213 PR OFFICE/OUTPT VISIT, EST, LEVL III, 20-29 MIN: ICD-10-PCS | Mod: S$PBB,,, | Performed by: STUDENT IN AN ORGANIZED HEALTH CARE EDUCATION/TRAINING PROGRAM

## 2021-12-10 PROCEDURE — 99213 OFFICE O/P EST LOW 20 MIN: CPT | Mod: S$PBB,,, | Performed by: STUDENT IN AN ORGANIZED HEALTH CARE EDUCATION/TRAINING PROGRAM

## 2021-12-13 ENCOUNTER — LAB VISIT (OUTPATIENT)
Dept: LAB | Facility: CLINIC | Age: 76
End: 2021-12-13
Payer: MEDICARE

## 2021-12-13 DIAGNOSIS — R19.7 DIARRHEA, UNSPECIFIED TYPE: ICD-10-CM

## 2021-12-13 LAB
C DIFF GDH STL QL: NEGATIVE
C DIFF TOX A+B STL QL IA: NEGATIVE
OB PNL STL: NEGATIVE
WBC #/AREA STL HPF: NORMAL /[HPF]

## 2021-12-13 PROCEDURE — 82272 OCCULT BLD FECES 1-3 TESTS: CPT | Performed by: STUDENT IN AN ORGANIZED HEALTH CARE EDUCATION/TRAINING PROGRAM

## 2021-12-13 PROCEDURE — 87449 NOS EACH ORGANISM AG IA: CPT | Performed by: STUDENT IN AN ORGANIZED HEALTH CARE EDUCATION/TRAINING PROGRAM

## 2021-12-13 PROCEDURE — 87324 CLOSTRIDIUM AG IA: CPT | Performed by: STUDENT IN AN ORGANIZED HEALTH CARE EDUCATION/TRAINING PROGRAM

## 2021-12-13 PROCEDURE — 87046 STOOL CULTR AEROBIC BACT EA: CPT | Mod: 59 | Performed by: STUDENT IN AN ORGANIZED HEALTH CARE EDUCATION/TRAINING PROGRAM

## 2021-12-13 PROCEDURE — 87045 FECES CULTURE AEROBIC BACT: CPT | Performed by: STUDENT IN AN ORGANIZED HEALTH CARE EDUCATION/TRAINING PROGRAM

## 2021-12-13 PROCEDURE — 89055 LEUKOCYTE ASSESSMENT FECAL: CPT | Performed by: STUDENT IN AN ORGANIZED HEALTH CARE EDUCATION/TRAINING PROGRAM

## 2021-12-13 PROCEDURE — 87427 SHIGA-LIKE TOXIN AG IA: CPT | Performed by: STUDENT IN AN ORGANIZED HEALTH CARE EDUCATION/TRAINING PROGRAM

## 2021-12-15 LAB
E COLI SXT1 STL QL IA: NEGATIVE
E COLI SXT2 STL QL IA: NEGATIVE

## 2021-12-16 LAB — O+P STL MICRO: NORMAL

## 2021-12-17 LAB — BACTERIA STL CULT: NORMAL

## 2022-01-06 ENCOUNTER — CLINICAL SUPPORT (OUTPATIENT)
Dept: CARDIOLOGY | Facility: HOSPITAL | Age: 77
End: 2022-01-06
Payer: MEDICARE

## 2022-01-06 DIAGNOSIS — Z95.810 PRESENCE OF AUTOMATIC (IMPLANTABLE) CARDIAC DEFIBRILLATOR: ICD-10-CM

## 2022-01-06 PROCEDURE — 93295 CARDIAC DEVICE CHECK - REMOTE: ICD-10-PCS | Mod: ,,, | Performed by: INTERNAL MEDICINE

## 2022-01-06 PROCEDURE — 93295 DEV INTERROG REMOTE 1/2/MLT: CPT | Mod: ,,, | Performed by: INTERNAL MEDICINE

## 2022-01-18 ENCOUNTER — TELEPHONE (OUTPATIENT)
Dept: FAMILY MEDICINE | Facility: CLINIC | Age: 77
End: 2022-01-18
Payer: MEDICARE

## 2022-01-19 ENCOUNTER — TELEPHONE (OUTPATIENT)
Dept: FAMILY MEDICINE | Facility: CLINIC | Age: 77
End: 2022-01-19
Payer: MEDICARE

## 2022-01-19 NOTE — TELEPHONE ENCOUNTER
----- Message from Rhonda Copeland sent at 1/19/2022 11:12 AM CST -----  Regarding: lab results  MRN [id number] was here for his appt with Rhea but she is out today he is asking if someone can print out his last set of lab results ----please call pt when ready   [phone]

## 2022-01-21 ENCOUNTER — OFFICE VISIT (OUTPATIENT)
Dept: FAMILY MEDICINE | Facility: CLINIC | Age: 77
End: 2022-01-21
Payer: MEDICARE

## 2022-01-21 VITALS
DIASTOLIC BLOOD PRESSURE: 66 MMHG | OXYGEN SATURATION: 98 % | SYSTOLIC BLOOD PRESSURE: 134 MMHG | BODY MASS INDEX: 32.17 KG/M2 | HEART RATE: 68 BPM | WEIGHT: 200.19 LBS | TEMPERATURE: 99 F | HEIGHT: 66 IN

## 2022-01-21 DIAGNOSIS — U09.9 PERSISTENT FATIGUE AFTER COVID-19: ICD-10-CM

## 2022-01-21 DIAGNOSIS — I25.10 CORONARY ARTERY DISEASE INVOLVING NATIVE CORONARY ARTERY OF NATIVE HEART WITHOUT ANGINA PECTORIS: ICD-10-CM

## 2022-01-21 DIAGNOSIS — Z95.810 ICD (IMPLANTABLE CARDIOVERTER-DEFIBRILLATOR) IN PLACE: ICD-10-CM

## 2022-01-21 DIAGNOSIS — E03.9 HYPOTHYROIDISM, UNSPECIFIED TYPE: ICD-10-CM

## 2022-01-21 DIAGNOSIS — R19.7 DIARRHEA, UNSPECIFIED TYPE: ICD-10-CM

## 2022-01-21 DIAGNOSIS — E78.00 HYPERCHOLESTEROLEMIA: ICD-10-CM

## 2022-01-21 DIAGNOSIS — R53.83 PERSISTENT FATIGUE AFTER COVID-19: ICD-10-CM

## 2022-01-21 DIAGNOSIS — Z79.4 TYPE 2 DIABETES MELLITUS WITH OTHER SPECIFIED COMPLICATION, WITH LONG-TERM CURRENT USE OF INSULIN: ICD-10-CM

## 2022-01-21 DIAGNOSIS — E66.9 OBESITY, CLASS I, BMI 30-34.9: Primary | ICD-10-CM

## 2022-01-21 DIAGNOSIS — I10 PRIMARY HYPERTENSION: ICD-10-CM

## 2022-01-21 DIAGNOSIS — E11.69 TYPE 2 DIABETES MELLITUS WITH OTHER SPECIFIED COMPLICATION, WITH LONG-TERM CURRENT USE OF INSULIN: ICD-10-CM

## 2022-01-21 PROCEDURE — 99214 OFFICE O/P EST MOD 30 MIN: CPT | Mod: S$PBB,,, | Performed by: FAMILY MEDICINE

## 2022-01-21 PROCEDURE — 99214 PR OFFICE/OUTPT VISIT, EST, LEVL IV, 30-39 MIN: ICD-10-PCS | Mod: S$PBB,,, | Performed by: FAMILY MEDICINE

## 2022-01-21 PROCEDURE — 99999 PR PBB SHADOW E&M-EST. PATIENT-LVL V: CPT | Mod: PBBFAC,,, | Performed by: FAMILY MEDICINE

## 2022-01-21 PROCEDURE — 99999 PR PBB SHADOW E&M-EST. PATIENT-LVL V: ICD-10-PCS | Mod: PBBFAC,,, | Performed by: FAMILY MEDICINE

## 2022-01-21 PROCEDURE — 99215 OFFICE O/P EST HI 40 MIN: CPT | Mod: PBBFAC,PN | Performed by: FAMILY MEDICINE

## 2022-01-21 RX ORDER — HYDROCODONE BITARTRATE AND ACETAMINOPHEN 7.5; 325 MG/1; MG/1
TABLET ORAL
COMMUNITY
Start: 2022-01-11

## 2022-01-21 RX ORDER — MEMANTINE HYDROCHLORIDE 10 MG/1
10 TABLET ORAL 2 TIMES DAILY
COMMUNITY
Start: 2021-12-20

## 2022-01-21 RX ORDER — LEVOTHYROXINE SODIUM 75 UG/1
75 TABLET ORAL
Qty: 30 TABLET | Refills: 11 | Status: SHIPPED | OUTPATIENT
Start: 2022-01-21 | End: 2023-01-21

## 2022-01-21 NOTE — PROGRESS NOTES
Subjective:       Patient ID: Moiz Valencia is a 76 y.o. male.    Chief Complaint: Fatigue (Denies any improvement) and Abdominal Pain (Frequent loose b.m daily. Ongoing for couple of months.denies any n.v. h.b or reflux)    This patient is new to me.  Moiz Valencia presents to the clinic today with his wife (Esther) for follow up on fatigue and loose stools. Patient states the problems are persisting and literally any food he eats he will have a bowel movement.  Patient states he has not seen gastroenterology in years. Patient states he did have his gallbladder out but this was 5 or more years ago. Patient reports the loose stool has only been for a few months now.   Patient states he had COVID and since then he has fatigue. Patient states he just wants to sleep all day. Esther reports he is taking vitamin supplements but not like he was when he had COVID.   Patient educated on plan of care, verbalized understanding.     Review of Systems   Constitutional: Positive for fatigue. Negative for activity change, appetite change, chills, diaphoresis and fever.   HENT: Negative for congestion, ear pain, postnasal drip, sinus pressure, sneezing and sore throat.    Eyes: Negative for pain, discharge, redness and itching.   Respiratory: Negative for apnea, cough, chest tightness, shortness of breath and wheezing.    Cardiovascular: Negative for chest pain and leg swelling.   Gastrointestinal: Positive for diarrhea. Negative for abdominal distention, abdominal pain, constipation, nausea and vomiting.   Genitourinary: Negative for difficulty urinating, dysuria, flank pain and frequency.   Skin: Negative for color change, rash and wound.   Neurological: Negative for dizziness.       Patient Active Problem List   Diagnosis    Coronary artery disease involving native coronary artery of native heart without angina pectoris    Hypertension    Type 2 diabetes mellitus    Left heart failure    Long term current use of  amiodarone    ICD (implantable cardioverter-defibrillator) in place    Encounter for pre-operative cardiovascular clearance    Hypercholesterolemia    Adiposity    Incisional hernia, without obstruction or gangrene    Current smoker    Ankylosing spondylitis    Arthritis    Obesity with body mass index 30 or greater    Bilateral deafness    Depression    Myocardial infarction    Cardiac disease    Thoracic and lumbosacral neuritis    Non-smoker    Spinal stenosis    Non-rheumatic mitral regurgitation    Tobacco dependence due to chewing tobacco    Obesity, Class I, BMI 30-34.9    Sleep apnea    Calculus of gallbladder with chronic cholecystitis without obstruction    H/O cataract removal with insertion of prosthetic lens    Labile blood pressure    Status post total hip replacement, right    Iron deficiency anemia    B12 deficiency    Diverticulosis    Vitamin D deficiency    Obstructive sleep apnea of adult    Hypothyroidism    Gout    Dementia    Degeneration of lumbar or lumbosacral intervertebral disc    Chronic pain syndrome    Cardiac pacemaker in situ    Benign essential hypertension       Objective:      Physical Exam  Vitals reviewed.   Constitutional:       General: He is not in acute distress.     Appearance: Normal appearance. He is well-developed.   HENT:      Head: Normocephalic.      Nose: Nose normal.   Eyes:      Conjunctiva/sclera: Conjunctivae normal.      Pupils: Pupils are equal, round, and reactive to light.   Cardiovascular:      Rate and Rhythm: Normal rate and regular rhythm.      Heart sounds: Normal heart sounds.   Pulmonary:      Effort: Pulmonary effort is normal. No respiratory distress.      Breath sounds: Normal breath sounds.   Abdominal:      General: There is no distension.      Palpations: Abdomen is soft.      Tenderness: There is no abdominal tenderness.   Musculoskeletal:      Cervical back: Normal range of motion and neck supple.   Skin:      "General: Skin is warm and dry.      Findings: No rash.   Neurological:      Mental Status: He is alert and oriented to person, place, and time.   Psychiatric:         Mood and Affect: Mood normal.         Behavior: Behavior normal.         Lab Results   Component Value Date    WBC 8.89 11/19/2021    HGB 12.9 (L) 11/19/2021    HCT 39.2 (L) 11/19/2021     11/19/2021    CHOL 113 (L) 03/15/2021    TRIG 51 03/15/2021    HDL 50 03/15/2021    ALT 22 11/19/2021    AST 18 11/19/2021     11/19/2021    K 4.1 11/19/2021     11/19/2021    CREATININE 1.0 11/19/2021    BUN 17 11/19/2021    CO2 23 11/19/2021    TSH 4.088 (H) 11/19/2021    HGBA1C 6.0 (H) 11/19/2021     The ASCVD Risk score (Sisi MIGUEL Jr., et al., 2013) failed to calculate for the following reasons:    The patient has a prior MI or stroke diagnosis  Visit Vitals  /66 (BP Location: Left arm, Patient Position: Sitting, BP Method: Medium (Manual))   Pulse 68   Temp 98.6 °F (37 °C) (Temporal)   Ht 5' 6" (1.676 m)   Wt 90.8 kg (200 lb 2.8 oz)   SpO2 98%   BMI 32.31 kg/m²      Assessment:       1. Obesity, Class I, BMI 30-34.9    2. Type 2 diabetes mellitus with other specified complication, with long-term current use of insulin    3. Primary hypertension    4. Hypercholesterolemia    5. Coronary artery disease involving native coronary artery of native heart without angina pectoris    6. ICD (implantable cardioverter-defibrillator) in place    7. Hypothyroidism, unspecified type    8. Diarrhea, unspecified type    9. Persistent fatigue after COVID-19        Plan:       Moiz was seen today for fatigue and abdominal pain.    Diagnoses and all orders for this visit:    Obesity, Class I, BMI 30-34.9  Body mass index is 32.31 kg/m².  Continue healthy diet and regular exercise as tolerated.  Continue medications as prescribed.  Follow up with PCP    Type 2 diabetes mellitus with other specific complications, with long-term current use of " insulin  Stable  Continue medications as prescribed.  Follow up with PCP    Primary hypertension / Coronary artery disease involving native coronary artery of native heart without angina pectoris / Hypercholesterolemia / ICD (implantable cardioverter-defibrillator) in place  Stable  Continue medications as prescribed.  Follow up with PCP and cardiology, Dr Sarkar    Hypothyroidism, unspecified type  -     levothyroxine (SYNTHROID) 75 MCG tablet; Take 1 tablet (75 mcg total) by mouth before breakfast.  -     TSH; Future    Diarrhea, unspecified type  -     Ambulatory referral/consult to Gastroenterology; Future  Discussed metamucil daily, if no improvement in one week call to schedule GI consult.     Persistent fatigue after COVID-19  Discussed Vitamin D, C and Zinc supplements.  Continue medications as prescribed.  Follow up with PCP     Follow up in about 6 months (around 7/21/2022), or if symptoms worsen or fail to improve.      Future Appointments     Date Provider Specialty Appt Notes    1/24/2022 Dorota Mcdowell NP Gastroenterology R19.7]    4/6/2022  Cardiology 90 Day Remote Cardiac Device Check    4/20/2022  Lab TSH    7/21/2022 Bonnie Molina NP Family Medicine 6 mo f/u

## 2022-04-06 ENCOUNTER — CLINICAL SUPPORT (OUTPATIENT)
Dept: CARDIOLOGY | Facility: HOSPITAL | Age: 77
End: 2022-04-06
Payer: MEDICARE

## 2022-04-06 DIAGNOSIS — Z95.810 PRESENCE OF AUTOMATIC (IMPLANTABLE) CARDIAC DEFIBRILLATOR: ICD-10-CM

## 2022-07-05 ENCOUNTER — CLINICAL SUPPORT (OUTPATIENT)
Dept: CARDIOLOGY | Facility: HOSPITAL | Age: 77
End: 2022-07-05
Payer: MEDICARE

## 2022-07-05 DIAGNOSIS — Z95.810 PRESENCE OF AUTOMATIC (IMPLANTABLE) CARDIAC DEFIBRILLATOR: ICD-10-CM

## 2022-07-05 PROCEDURE — 93295 CARDIAC DEVICE CHECK - REMOTE: ICD-10-PCS | Mod: ,,, | Performed by: INTERNAL MEDICINE

## 2022-07-05 PROCEDURE — 93295 DEV INTERROG REMOTE 1/2/MLT: CPT | Mod: ,,, | Performed by: INTERNAL MEDICINE

## 2022-08-02 ENCOUNTER — HOSPITAL ENCOUNTER (OUTPATIENT)
Dept: RADIOLOGY | Facility: HOSPITAL | Age: 77
Discharge: HOME | End: 2022-08-02
Attending: INTERNAL MEDICINE
Payer: MEDICARE

## 2022-08-02 ENCOUNTER — OFFICE VISIT (OUTPATIENT)
Dept: CARDIOLOGY | Facility: CLINIC | Age: 77
End: 2022-08-02
Payer: MEDICARE

## 2022-08-02 ENCOUNTER — CLINICAL SUPPORT (OUTPATIENT)
Dept: CARDIOLOGY | Facility: HOSPITAL | Age: 77
End: 2022-08-02
Attending: INTERNAL MEDICINE
Payer: MEDICARE

## 2022-08-02 VITALS
WEIGHT: 204.81 LBS | BODY MASS INDEX: 32.92 KG/M2 | HEART RATE: 49 BPM | SYSTOLIC BLOOD PRESSURE: 111 MMHG | DIASTOLIC BLOOD PRESSURE: 56 MMHG | HEIGHT: 66 IN

## 2022-08-02 DIAGNOSIS — Z79.899 LONG TERM CURRENT USE OF AMIODARONE: ICD-10-CM

## 2022-08-02 DIAGNOSIS — Z79.899 LONG TERM CURRENT USE OF AMIODARONE: Chronic | ICD-10-CM

## 2022-08-02 DIAGNOSIS — I25.10 CORONARY ARTERY DISEASE INVOLVING NATIVE CORONARY ARTERY OF NATIVE HEART WITHOUT ANGINA PECTORIS: Chronic | ICD-10-CM

## 2022-08-02 DIAGNOSIS — I34.0 NON-RHEUMATIC MITRAL REGURGITATION: Chronic | ICD-10-CM

## 2022-08-02 DIAGNOSIS — I50.1 LEFT HEART FAILURE: ICD-10-CM

## 2022-08-02 DIAGNOSIS — E66.9 OBESITY, CLASS I, BMI 30-34.9: Chronic | ICD-10-CM

## 2022-08-02 DIAGNOSIS — E78.00 HYPERCHOLESTEROLEMIA: Chronic | ICD-10-CM

## 2022-08-02 DIAGNOSIS — Z95.810 PRESENCE OF AUTOMATIC (IMPLANTABLE) CARDIAC DEFIBRILLATOR: ICD-10-CM

## 2022-08-02 DIAGNOSIS — F17.220 TOBACCO DEPENDENCE DUE TO CHEWING TOBACCO: Chronic | ICD-10-CM

## 2022-08-02 DIAGNOSIS — I50.1 LEFT HEART FAILURE: Primary | Chronic | ICD-10-CM

## 2022-08-02 DIAGNOSIS — Z95.810 ICD (IMPLANTABLE CARDIOVERTER-DEFIBRILLATOR) IN PLACE: Chronic | ICD-10-CM

## 2022-08-02 PROCEDURE — 71046 XR CHEST PA AND LATERAL: ICD-10-PCS | Mod: 26,,, | Performed by: RADIOLOGY

## 2022-08-02 PROCEDURE — 99999 PR PBB SHADOW E&M-EST. PATIENT-LVL III: ICD-10-PCS | Mod: PBBFAC,,, | Performed by: INTERNAL MEDICINE

## 2022-08-02 PROCEDURE — 99213 OFFICE O/P EST LOW 20 MIN: CPT | Mod: PBBFAC,25,PO | Performed by: INTERNAL MEDICINE

## 2022-08-02 PROCEDURE — 99214 PR OFFICE/OUTPT VISIT, EST, LEVL IV, 30-39 MIN: ICD-10-PCS | Mod: S$PBB,,, | Performed by: INTERNAL MEDICINE

## 2022-08-02 PROCEDURE — 93282 CARDIAC DEVICE CHECK - IN CLINIC & HOSPITAL: ICD-10-PCS | Mod: 26,,, | Performed by: INTERNAL MEDICINE

## 2022-08-02 PROCEDURE — 71046 X-RAY EXAM CHEST 2 VIEWS: CPT | Mod: TC,FY,PO

## 2022-08-02 PROCEDURE — 99999 PR PBB SHADOW E&M-EST. PATIENT-LVL III: CPT | Mod: PBBFAC,,, | Performed by: INTERNAL MEDICINE

## 2022-08-02 PROCEDURE — 99214 OFFICE O/P EST MOD 30 MIN: CPT | Mod: S$PBB,,, | Performed by: INTERNAL MEDICINE

## 2022-08-02 PROCEDURE — 71046 X-RAY EXAM CHEST 2 VIEWS: CPT | Mod: 26,,, | Performed by: RADIOLOGY

## 2022-08-02 PROCEDURE — 93282 PRGRMG EVAL IMPLANTABLE DFB: CPT | Mod: 26,,, | Performed by: INTERNAL MEDICINE

## 2022-08-02 RX ORDER — NAPROXEN 500 MG/1
500 TABLET ORAL 2 TIMES DAILY WITH MEALS
COMMUNITY
Start: 2022-04-08

## 2022-08-02 RX ORDER — QUETIAPINE FUMARATE 100 MG/1
50 TABLET, FILM COATED ORAL
COMMUNITY
Start: 2022-05-01

## 2022-08-02 RX ORDER — LANOLIN ALCOHOL/MO/W.PET/CERES
1000 CREAM (GRAM) TOPICAL DAILY
COMMUNITY
Start: 2022-04-08

## 2022-08-02 RX ORDER — MIRTAZAPINE 30 MG/1
15 TABLET, FILM COATED ORAL NIGHTLY
COMMUNITY
Start: 2022-03-21

## 2022-08-02 NOTE — PROGRESS NOTES
Subjective:    Patient ID:  Moiz Valencia is a 76 y.o. male who presents for Left heart failure and PACEMAKER        HPI    MISSED APPOINTMENTS, NO FOLLOW-UP SINCE MAY 2021, LABS FROM NOVEMBER CMP OK, AICD CHECK, HIGH RV THRESHOLD CHRONIC, SEE ROS  Past Medical History:   Diagnosis Date    Acute coronary syndrome     CAD (coronary artery disease)     Cardiomyopathy     Carotid artery occlusion     CHF (congestive heart failure)     Diabetes mellitus, type 2     H/O cataract removal with insertion of prosthetic lens     bilat    Heart murmur     Hypercholesteremia     Hypertension     Old MI (myocardial infarction)     Pneumonia     Pneumonia 2016    Polio     PUD (peptic ulcer disease)     Sleep apnea     Valvular regurgitation      Past Surgical History:   Procedure Laterality Date    CARDIAC PACEMAKER PLACEMENT      CARDIAC SURGERY      pt states that he had 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    CHOLECYSTECTOMY      COLONOSCOPY      CORONARY ANGIOPLASTY      CORONARY ARTERY BYPASS GRAFT      HERNIA REPAIR      HIP SURGERY Right 2018    HIP SURGERY Left 2018    INCISIONAL HERNIA REPAIR  2017    LAPAROSCOPIC CHOLECYSTECTOMY N/A 2019    Procedure: CHOLECYSTECTOMY-LAPAROSCOPIC;  Surgeon: Aditya Zeng MD;  Location: [facility];  Service: General;  Laterality: N/A;    left ventricular  aneurysm repair       TONSILLECTOMY       Family History   Problem Relation Age of Onset    Diabetes Mother     Heart disease Mother     No Known Problems Father      Social History     Socioeconomic History    Marital status:    Tobacco Use    Smoking status: Former Smoker     Quit date: 1984     Years since quittin.1    Smokeless tobacco: Former User     Types: Chew     Quit date: 2018   Substance and Sexual Activity    Alcohol use: No    Drug use: No       Review of patient's allergies indicates:  No Known Allergies    Current  Outpatient Medications:     allopurinol (ZYLOPRIM) 300 MG tablet, Take 300 mg by mouth once daily., Disp: , Rfl:     amiodarone (PACERONE) 200 MG Tab, TAKE 1 TABLET(200 MG) BY MOUTH EVERY DAY, Disp: 30 tablet, Rfl: 0    atorvastatin (LIPITOR) 80 MG tablet, TAKE 1 TABLET(80 MG) BY MOUTH EVERY EVENING, Disp: 90 tablet, Rfl: 2    B-complex with vitamin C (Z-BEC OR EQUIV) tablet, Take 1 tablet by mouth once daily., Disp: , Rfl:     blood sugar diagnostic Strp, To check BG 2 times daily, to use with insurance preferred meter, Disp: 100 each, Rfl: 3    cyanocobalamin (VITAMIN B-12) 1000 MCG tablet, 1,000 mcg., Disp: , Rfl:     donepezil (ARICEPT) 10 MG tablet, Take 10 mg by mouth once daily., Disp: , Rfl:     DULoxetine (CYMBALTA) 20 MG capsule, Take 60 mg by mouth once daily. , Disp: , Rfl:     ENTRESTO 24-26 mg per tablet, TAKE 1 TABLET BY MOUTH TWICE DAILY, Disp: 180 tablet, Rfl: 1    FLUZONE HIGH-DOSE 2019-20, PF, 180 mcg/0.5 mL Syrg, ADM 0.5ML IM UTD, Disp: , Rfl: 0    gabapentin (NEURONTIN) 100 MG capsule, Take 1 capsule (100 mg total) by mouth 2 (two) times daily., Disp: , Rfl:     insulin detemir U-100 (LEVEMIR) 100 unit/mL injection, Inject 5 Units into the skin every evening., Disp: , Rfl:     lancets Misc, To check BG 2 times daily, to use with insurance preferred meter, Disp: 100 each, Rfl: 3    levothyroxine (SYNTHROID) 75 MCG tablet, Take 1 tablet (75 mcg total) by mouth before breakfast., Disp: 30 tablet, Rfl: 11    magnesium oxide (MAG-OX) 400 mg tablet, Take one tablet daily.  night time, Disp: , Rfl: 0    memantine (NAMENDA) 10 MG Tab, TAKE ONE TABLET BY MOUTH TWICE A DAY FOR DEMENTIA, Disp: , Rfl:     metformin (GLUCOPHAGE) 1000 MG tablet, Take 1,000 mg by mouth 2 (two) times daily with meals., Disp: , Rfl:     mirtazapine (REMERON) 30 MG tablet, 15 mg., Disp: , Rfl:     naproxen (NAPROSYN) 500 MG tablet, 500 mg., Disp: , Rfl:     oxybutynin (DITROPAN-XL) 5 MG TR24, TAKE 1 TABLET(5  MG) BY MOUTH EVERY DAY, Disp: 90 tablet, Rfl: 1    QUEtiapine (SEROQUEL) 100 MG Tab, 50 mg., Disp: , Rfl:     aspirin (ECOTRIN) 81 MG EC tablet, Take 1 tablet (81 mg total) by mouth once daily. (Patient not taking: No sig reported), Disp: , Rfl: 0    blood-glucose meter kit, To check BG 2 times daily, to use with insurance preferred meter, Disp: 1 each, Rfl: 0    HYDROcodone-acetaminophen (NORCO) 7.5-325 mg per tablet, TAKE 1 TABLET BY MOUTH TWICE A DAY AS NEEDED TAKE ONLY AS NEEDED FOR SEVERE PAIN  DO NOT DRINK ALCOHOL OR DRIVE WHILE TAKING THIS MED TAKE ONLY AS NEEDED FOR SEVERE PAIN  DO NOT DRINK ALCOHOL OR DRIVE WHILE TAKING THIS MED, Disp: , Rfl:     zinc gluconate 50 mg tablet, Take 50 mg by mouth once daily., Disp: , Rfl:     Review of Systems   Constitutional: Positive for malaise/fatigue. Negative for chills, diaphoresis, fever and night sweats.   HENT: Negative for congestion and nosebleeds.    Eyes: Negative for blurred vision and visual disturbance.   Cardiovascular: Positive for dyspnea on exertion (MILD). Negative for chest pain, claudication, cyanosis, irregular heartbeat, leg swelling, near-syncope, orthopnea, palpitations, paroxysmal nocturnal dyspnea and syncope.   Respiratory: Negative for cough, hemoptysis, shortness of breath and wheezing.    Endocrine: Negative.         BG OK   Hematologic/Lymphatic: Negative for adenopathy. Does not bruise/bleed easily.   Skin: Negative for itching and rash.   Musculoskeletal: Positive for arthritis (CHRONIC) and back pain (CHRONIC, LOWER). Negative for falls. Muscle weakness: LEGS.   Gastrointestinal: Negative for abdominal pain, change in bowel habit, dysphagia, jaundice, melena and nausea.   Genitourinary: Negative for dysuria and flank pain.   Neurological: Negative for brief paralysis, focal weakness, headaches, light-headedness (OCC), loss of balance (MILD) and weakness.   Psychiatric/Behavioral: Negative for altered mental status and depression.  "  Allergic/Immunologic: Negative.         Objective:      Vitals:    08/02/22 1210   BP: (!) 111/56   Pulse: (!) 49   Weight: 92.9 kg (204 lb 12.9 oz)   Height: 5' 6" (1.676 m)   PainSc: 0-No pain     Body mass index is 33.06 kg/m².    Physical Exam  Constitutional:       Appearance: He is well-developed. He is obese.      Comments: OVERWEIGHT   HENT:      Head: Normocephalic and atraumatic.   Eyes:      General: No scleral icterus.     Extraocular Movements: Extraocular movements intact.   Neck:      Vascular: Normal carotid pulses. No carotid bruit or JVD.   Cardiovascular:      Rate and Rhythm: Normal rate and regular rhythm.  No extrasystoles are present.     Chest Wall: PMI is displaced.      Pulses:           Carotid pulses are 2+ on the right side and 2+ on the left side.       Radial pulses are 2+ on the right side and 2+ on the left side.        Posterior tibial pulses are 2+ on the right side and 2+ on the left side.      Heart sounds: Murmur heard.    Systolic murmur is present with a grade of 1/6 at the lower left sternal border and apex.   Systolic murmuris also present.    No friction rub. No gallop.   Pulmonary:      Effort: Pulmonary effort is normal. No respiratory distress.      Breath sounds: Normal breath sounds. No rales.   Chest:          Comments: STERNOTOMY SCAR, AICD/ L SIDE  Abdominal:      Palpations: Abdomen is soft. There is no hepatomegaly.      Tenderness: There is no abdominal tenderness.   Musculoskeletal:         General: Normal range of motion.      Cervical back: Neck supple.      Right lower leg: No edema.      Left lower leg: No edema.   Skin:     General: Skin is warm and dry.      Capillary Refill: Capillary refill takes less than 2 seconds.   Neurological:      Mental Status: He is alert and oriented to person, place, and time.   Psychiatric:         Mood and Affect: Mood normal.         Speech: Speech normal.         Behavior: Behavior normal.                 ..    Chemistry "        Component Value Date/Time     11/19/2021 1101    K 4.1 11/19/2021 1101     11/19/2021 1101    CO2 23 11/19/2021 1101    BUN 17 11/19/2021 1101    CREATININE 1.0 11/19/2021 1101     (H) 11/19/2021 1101        Component Value Date/Time    CALCIUM 9.5 11/19/2021 1101    ALKPHOS 75 11/19/2021 1101    AST 18 11/19/2021 1101    AST 15 (L) 11/10/2015 0819    ALT 22 11/19/2021 1101    BILITOT 0.6 11/19/2021 1101    ESTGFRAFRICA >60 11/19/2021 1101    EGFRNONAA >60 11/19/2021 1101            ..  Lab Results   Component Value Date    CHOL 113 (L) 03/15/2021    CHOL 124 11/18/2020    CHOL 180 03/25/2019     Lab Results   Component Value Date    HDL 50 03/15/2021    HDL 60 11/18/2020    HDL 52 03/25/2019     Lab Results   Component Value Date    LDLCALC 52.8 (L) 03/15/2021    LDLCALC 56.8 (L) 11/18/2020    LDLCALC 104.4 03/25/2019     Lab Results   Component Value Date    TRIG 51 03/15/2021    TRIG 36 11/18/2020    TRIG 118 03/25/2019     Lab Results   Component Value Date    CHOLHDL 44.2 03/15/2021    CHOLHDL 48.4 11/18/2020    CHOLHDL 28.9 03/25/2019     ..  Lab Results   Component Value Date    WBC 8.89 11/19/2021    HGB 12.9 (L) 11/19/2021    HCT 39.2 (L) 11/19/2021    MCV 95 11/19/2021     11/19/2021       Test(s) Reviewed  I have reviewed the following in detail:  [] Stress test   [] Angiography   [] Echocardiogram   [x] Labs   [x] Other:       Assessment:         ICD-10-CM ICD-9-CM   1. Left heart failure  I50.1 428.1   2. Coronary artery disease involving native coronary artery of native heart without angina pectoris  I25.10 414.01   3. Long term current use of amiodarone  Z79.899 V58.69   4. Non-rheumatic mitral regurgitation  I34.0 424.0   5. ICD (implantable cardioverter-defibrillator) in place  Z95.810 V45.02   6. Obesity, Class I, BMI 30-34.9  E66.9 278.00   7. Tobacco dependence due to chewing tobacco  F17.220 305.1   8. Hypercholesterolemia  E78.00 272.0     Problem List Items  Addressed This Visit        Cardiac/Vascular    Coronary artery disease involving native coronary artery of native heart without angina pectoris    Relevant Orders    Echo    Comprehensive Metabolic Panel    Lipid Panel    CBC Auto Differential    Left heart failure - Primary    Relevant Orders    Echo    Comprehensive Metabolic Panel    CBC Auto Differential    Long term current use of amiodarone    Relevant Orders    Comprehensive Metabolic Panel    TSH    T3    T4    X-Ray Chest PA And Lateral (Completed)    ICD (implantable cardioverter-defibrillator) in place    Hypercholesterolemia    Relevant Orders    Comprehensive Metabolic Panel    Lipid Panel    Ankle Brachial Indices (MARLENI)    Non-rheumatic mitral regurgitation    Relevant Orders    Echo       Endocrine    Obesity, Class I, BMI 30-34.9       Other    Tobacco dependence due to chewing tobacco    Relevant Orders    Ankle Brachial Indices (MARLENI)           Plan:     wi                                                                                                                                                                                                                                                                                                                                                                                                                                                                                                                                                                                                                                                                                                                                                                                                                                                                                                                                                                                 WILL NEED FURTHER EVALUATION, CHECK LABS, CXR ON AMIODARONE, MARLENI,  ECHO REASSESS EF, DAILY WEIGHT EXPLAINED 2 LB PER DAY 5 LB PER WEEK RULE, CLASS 2 HEART FAILURE NO ANGINA NO APPARENT ARRHYTHMIA DIET EXERCISE WALKING EXERCISE NEEDS MORE ACTIVITY, TOBACCO CESSATION COUNSELING, DISCUSSED PLAN WITH THE PATIENT AND HIS, WIFE, RETURN TO CLINIC IN 6 MO      Left heart failure  -     Echo  -     Comprehensive Metabolic Panel; Future; Expected date: 08/02/2022  -     CBC Auto Differential; Future; Expected date: 08/02/2022    Coronary artery disease involving native coronary artery of native heart without angina pectoris  -     Echo  -     Comprehensive Metabolic Panel; Future; Expected date: 08/02/2022  -     Lipid Panel; Future; Expected date: 08/02/2022  -     CBC Auto Differential; Future; Expected date: 08/02/2022    Long term current use of amiodarone  -     Comprehensive Metabolic Panel; Future; Expected date: 08/02/2022  -     TSH; Future; Expected date: 08/02/2022  -     T3; Future; Expected date: 08/02/2022  -     T4; Future; Expected date: 08/02/2022  -     X-Ray Chest PA And Lateral; Future; Expected date: 08/02/2022    Non-rheumatic mitral regurgitation  -     Echo    ICD (implantable cardioverter-defibrillator) in place    Obesity, Class I, BMI 30-34.9    Tobacco dependence due to chewing tobacco  -     Ankle Brachial Indices (MARLENI); Future    Hypercholesterolemia  -     Comprehensive Metabolic Panel; Future; Expected date: 08/02/2022  -     Lipid Panel; Future; Expected date: 08/02/2022  -     Ankle Brachial Indices (MARLENI); Future    RTC Low level/low impact aerobic exercise 5x's/wk. Heart healthy diet and risk factor modification.    See labs and med orders.    Aerobic exercise 5x's/wk. Heart healthy diet and risk factor modification.    See labs and med orders.

## 2022-08-11 ENCOUNTER — LAB VISIT (OUTPATIENT)
Dept: LAB | Facility: CLINIC | Age: 77
End: 2022-08-11
Payer: MEDICARE

## 2022-08-11 DIAGNOSIS — E78.00 HYPERCHOLESTEROLEMIA: Chronic | ICD-10-CM

## 2022-08-11 DIAGNOSIS — I25.10 CORONARY ARTERY DISEASE INVOLVING NATIVE CORONARY ARTERY OF NATIVE HEART WITHOUT ANGINA PECTORIS: Chronic | ICD-10-CM

## 2022-08-11 DIAGNOSIS — Z79.899 LONG TERM CURRENT USE OF AMIODARONE: Chronic | ICD-10-CM

## 2022-08-11 DIAGNOSIS — I50.1 LEFT HEART FAILURE: Chronic | ICD-10-CM

## 2022-08-11 LAB
ALBUMIN SERPL BCP-MCNC: 3 G/DL (ref 3.5–5.2)
ALP SERPL-CCNC: 64 U/L (ref 55–135)
ALT SERPL W/O P-5'-P-CCNC: 12 U/L (ref 10–44)
ANION GAP SERPL CALC-SCNC: 6 MMOL/L (ref 8–16)
AST SERPL-CCNC: 12 U/L (ref 10–40)
BASOPHILS # BLD AUTO: 0.04 K/UL (ref 0–0.2)
BASOPHILS NFR BLD: 0.5 % (ref 0–1.9)
BILIRUB SERPL-MCNC: 0.2 MG/DL (ref 0.1–1)
BUN SERPL-MCNC: 25 MG/DL (ref 8–23)
CALCIUM SERPL-MCNC: 9.3 MG/DL (ref 8.7–10.5)
CHLORIDE SERPL-SCNC: 109 MMOL/L (ref 95–110)
CHOLEST SERPL-MCNC: 139 MG/DL (ref 120–199)
CHOLEST/HDLC SERPL: 2.6 {RATIO} (ref 2–5)
CO2 SERPL-SCNC: 25 MMOL/L (ref 23–29)
CREAT SERPL-MCNC: 1.2 MG/DL (ref 0.5–1.4)
DIFFERENTIAL METHOD: ABNORMAL
EOSINOPHIL # BLD AUTO: 0.3 K/UL (ref 0–0.5)
EOSINOPHIL NFR BLD: 2.9 % (ref 0–8)
ERYTHROCYTE [DISTWIDTH] IN BLOOD BY AUTOMATED COUNT: 14.1 % (ref 11.5–14.5)
EST. GFR  (NO RACE VARIABLE): >60 ML/MIN/1.73 M^2
GLUCOSE SERPL-MCNC: 116 MG/DL (ref 70–110)
HCT VFR BLD AUTO: 35.1 % (ref 40–54)
HDLC SERPL-MCNC: 54 MG/DL (ref 40–75)
HDLC SERPL: 38.8 % (ref 20–50)
HGB BLD-MCNC: 11.4 G/DL (ref 14–18)
IMM GRANULOCYTES # BLD AUTO: 0.02 K/UL (ref 0–0.04)
IMM GRANULOCYTES NFR BLD AUTO: 0.2 % (ref 0–0.5)
LDLC SERPL CALC-MCNC: 73.8 MG/DL (ref 63–159)
LYMPHOCYTES # BLD AUTO: 1.6 K/UL (ref 1–4.8)
LYMPHOCYTES NFR BLD: 18.1 % (ref 18–48)
MCH RBC QN AUTO: 32 PG (ref 27–31)
MCHC RBC AUTO-ENTMCNC: 32.5 G/DL (ref 32–36)
MCV RBC AUTO: 99 FL (ref 82–98)
MONOCYTES # BLD AUTO: 0.7 K/UL (ref 0.3–1)
MONOCYTES NFR BLD: 8.5 % (ref 4–15)
NEUTROPHILS # BLD AUTO: 6.1 K/UL (ref 1.8–7.7)
NEUTROPHILS NFR BLD: 69.8 % (ref 38–73)
NONHDLC SERPL-MCNC: 85 MG/DL
NRBC BLD-RTO: 0 /100 WBC
PLATELET # BLD AUTO: 259 K/UL (ref 150–450)
PMV BLD AUTO: 10.7 FL (ref 9.2–12.9)
POTASSIUM SERPL-SCNC: 4.4 MMOL/L (ref 3.5–5.1)
PROT SERPL-MCNC: 6.2 G/DL (ref 6–8.4)
RBC # BLD AUTO: 3.56 M/UL (ref 4.6–6.2)
SODIUM SERPL-SCNC: 140 MMOL/L (ref 136–145)
T3 SERPL-MCNC: 46 NG/DL (ref 60–180)
T4 FREE SERPL-MCNC: 0.87 NG/DL (ref 0.71–1.51)
T4 SERPL-MCNC: 7.1 UG/DL (ref 4.5–11.5)
TRIGL SERPL-MCNC: 56 MG/DL (ref 30–150)
TSH SERPL DL<=0.005 MIU/L-ACNC: 4.65 UIU/ML (ref 0.4–4)
WBC # BLD AUTO: 8.67 K/UL (ref 3.9–12.7)

## 2022-08-11 PROCEDURE — 84439 ASSAY OF FREE THYROXINE: CPT | Performed by: INTERNAL MEDICINE

## 2022-08-11 PROCEDURE — 84436 ASSAY OF TOTAL THYROXINE: CPT | Performed by: INTERNAL MEDICINE

## 2022-08-11 PROCEDURE — 36415 PR COLLECTION VENOUS BLOOD,VENIPUNCTURE: ICD-10-PCS | Mod: ,,, | Performed by: STUDENT IN AN ORGANIZED HEALTH CARE EDUCATION/TRAINING PROGRAM

## 2022-08-11 PROCEDURE — 85025 COMPLETE CBC W/AUTO DIFF WBC: CPT | Performed by: INTERNAL MEDICINE

## 2022-08-11 PROCEDURE — 80053 COMPREHEN METABOLIC PANEL: CPT | Performed by: INTERNAL MEDICINE

## 2022-08-11 PROCEDURE — 84480 ASSAY TRIIODOTHYRONINE (T3): CPT | Performed by: INTERNAL MEDICINE

## 2022-08-11 PROCEDURE — 84443 ASSAY THYROID STIM HORMONE: CPT | Performed by: INTERNAL MEDICINE

## 2022-08-11 PROCEDURE — 80061 LIPID PANEL: CPT | Performed by: INTERNAL MEDICINE

## 2022-08-11 PROCEDURE — 36415 COLL VENOUS BLD VENIPUNCTURE: CPT | Mod: ,,, | Performed by: STUDENT IN AN ORGANIZED HEALTH CARE EDUCATION/TRAINING PROGRAM

## 2022-10-03 ENCOUNTER — CLINICAL SUPPORT (OUTPATIENT)
Dept: CARDIOLOGY | Facility: HOSPITAL | Age: 77
End: 2022-10-03
Payer: MEDICARE

## 2022-10-03 DIAGNOSIS — Z95.810 PRESENCE OF AUTOMATIC (IMPLANTABLE) CARDIAC DEFIBRILLATOR: ICD-10-CM

## 2022-11-09 ENCOUNTER — HOSPITAL ENCOUNTER (EMERGENCY)
Facility: HOSPITAL | Age: 77
Discharge: HOME | End: 2022-11-09
Attending: EMERGENCY MEDICINE
Payer: MEDICARE

## 2022-11-09 VITALS
OXYGEN SATURATION: 95 % | RESPIRATION RATE: 22 BRPM | BODY MASS INDEX: 32.95 KG/M2 | HEIGHT: 66 IN | HEART RATE: 59 BPM | WEIGHT: 205 LBS | DIASTOLIC BLOOD PRESSURE: 53 MMHG | TEMPERATURE: 97 F | SYSTOLIC BLOOD PRESSURE: 111 MMHG

## 2022-11-09 DIAGNOSIS — N39.0 URINARY TRACT INFECTION WITHOUT HEMATURIA, SITE UNSPECIFIED: Primary | ICD-10-CM

## 2022-11-09 LAB
ALBUMIN SERPL BCP-MCNC: 2.9 G/DL (ref 3.5–5.2)
ALP SERPL-CCNC: 70 U/L (ref 55–135)
ALT SERPL W/O P-5'-P-CCNC: 16 U/L (ref 10–44)
ANION GAP SERPL CALC-SCNC: 8 MMOL/L (ref 8–16)
AST SERPL-CCNC: 15 U/L (ref 10–40)
BACTERIA #/AREA URNS HPF: ABNORMAL /HPF
BASOPHILS # BLD AUTO: 0.04 K/UL (ref 0–0.2)
BASOPHILS NFR BLD: 0.5 % (ref 0–1.9)
BILIRUB SERPL-MCNC: 0.7 MG/DL (ref 0.1–1)
BILIRUB UR QL STRIP: NEGATIVE
BUN SERPL-MCNC: 27 MG/DL (ref 8–23)
CALCIUM SERPL-MCNC: 8.9 MG/DL (ref 8.7–10.5)
CHLORIDE SERPL-SCNC: 102 MMOL/L (ref 95–110)
CLARITY UR: ABNORMAL
CO2 SERPL-SCNC: 25 MMOL/L (ref 23–29)
COLOR UR: YELLOW
CREAT SERPL-MCNC: 1.4 MG/DL (ref 0.5–1.4)
DIFFERENTIAL METHOD: ABNORMAL
EOSINOPHIL # BLD AUTO: 0.1 K/UL (ref 0–0.5)
EOSINOPHIL NFR BLD: 1.5 % (ref 0–8)
ERYTHROCYTE [DISTWIDTH] IN BLOOD BY AUTOMATED COUNT: 13.6 % (ref 11.5–14.5)
EST. GFR  (NO RACE VARIABLE): 51.4 ML/MIN/1.73 M^2
GLUCOSE SERPL-MCNC: 184 MG/DL (ref 70–110)
GLUCOSE UR QL STRIP: NEGATIVE
HCT VFR BLD AUTO: 33.3 % (ref 40–54)
HGB BLD-MCNC: 10.5 G/DL (ref 14–18)
HGB UR QL STRIP: ABNORMAL
HYALINE CASTS #/AREA URNS LPF: 36 /LPF
IMM GRANULOCYTES # BLD AUTO: 0.03 K/UL (ref 0–0.04)
IMM GRANULOCYTES NFR BLD AUTO: 0.3 % (ref 0–0.5)
KETONES UR QL STRIP: ABNORMAL
LEUKOCYTE ESTERASE UR QL STRIP: ABNORMAL
LYMPHOCYTES # BLD AUTO: 1.3 K/UL (ref 1–4.8)
LYMPHOCYTES NFR BLD: 14.3 % (ref 18–48)
MCH RBC QN AUTO: 31.1 PG (ref 27–31)
MCHC RBC AUTO-ENTMCNC: 31.5 G/DL (ref 32–36)
MCV RBC AUTO: 99 FL (ref 82–98)
MICROSCOPIC COMMENT: ABNORMAL
MONOCYTES # BLD AUTO: 0.9 K/UL (ref 0.3–1)
MONOCYTES NFR BLD: 10 % (ref 4–15)
NEUTROPHILS # BLD AUTO: 6.4 K/UL (ref 1.8–7.7)
NEUTROPHILS NFR BLD: 73.4 % (ref 38–73)
NITRITE UR QL STRIP: POSITIVE
NRBC BLD-RTO: 0 /100 WBC
PH UR STRIP: 6 [PH] (ref 5–8)
PLATELET # BLD AUTO: 261 K/UL (ref 150–450)
PMV BLD AUTO: 10.3 FL (ref 9.2–12.9)
POTASSIUM SERPL-SCNC: 5.1 MMOL/L (ref 3.5–5.1)
PROT SERPL-MCNC: 6.4 G/DL (ref 6–8.4)
PROT UR QL STRIP: ABNORMAL
RBC # BLD AUTO: 3.38 M/UL (ref 4.6–6.2)
RBC #/AREA URNS HPF: 3 /HPF (ref 0–4)
SODIUM SERPL-SCNC: 135 MMOL/L (ref 136–145)
SP GR UR STRIP: 1.03 (ref 1–1.03)
SQUAMOUS #/AREA URNS HPF: 0 /HPF
URN SPEC COLLECT METH UR: ABNORMAL
UROBILINOGEN UR STRIP-ACNC: ABNORMAL EU/DL
WBC # BLD AUTO: 8.72 K/UL (ref 3.9–12.7)
WBC #/AREA URNS HPF: 99 /HPF (ref 0–5)

## 2022-11-09 PROCEDURE — 81001 URINALYSIS AUTO W/SCOPE: CPT | Performed by: EMERGENCY MEDICINE

## 2022-11-09 PROCEDURE — 85025 COMPLETE CBC W/AUTO DIFF WBC: CPT | Performed by: EMERGENCY MEDICINE

## 2022-11-09 PROCEDURE — 63600175 PHARM REV CODE 636 W HCPCS: Performed by: EMERGENCY MEDICINE

## 2022-11-09 PROCEDURE — 87086 URINE CULTURE/COLONY COUNT: CPT | Performed by: EMERGENCY MEDICINE

## 2022-11-09 PROCEDURE — 87186 SC STD MICRODIL/AGAR DIL: CPT | Performed by: EMERGENCY MEDICINE

## 2022-11-09 PROCEDURE — 96374 THER/PROPH/DIAG INJ IV PUSH: CPT

## 2022-11-09 PROCEDURE — 99284 EMERGENCY DEPT VISIT MOD MDM: CPT

## 2022-11-09 PROCEDURE — 25000003 PHARM REV CODE 250: Performed by: EMERGENCY MEDICINE

## 2022-11-09 PROCEDURE — 87077 CULTURE AEROBIC IDENTIFY: CPT | Performed by: EMERGENCY MEDICINE

## 2022-11-09 PROCEDURE — 80053 COMPREHEN METABOLIC PANEL: CPT | Performed by: EMERGENCY MEDICINE

## 2022-11-09 RX ORDER — SODIUM CHLORIDE 9 MG/ML
500 INJECTION, SOLUTION INTRAVENOUS
Status: COMPLETED | OUTPATIENT
Start: 2022-11-09 | End: 2022-11-09

## 2022-11-09 RX ORDER — LEVOTHYROXINE SODIUM 50 UG/1
50 TABLET ORAL
COMMUNITY
Start: 2022-04-08 | End: 2023-11-08 | Stop reason: DRUGHIGH

## 2022-11-09 RX ORDER — QUETIAPINE FUMARATE 200 MG/1
100-200 TABLET, FILM COATED ORAL NIGHTLY PRN
COMMUNITY
Start: 2022-08-18

## 2022-11-09 RX ORDER — VIT C/E/ZN/COPPR/LUTEIN/ZEAXAN 250MG-90MG
1000 CAPSULE ORAL DAILY
COMMUNITY

## 2022-11-09 RX ORDER — CEFUROXIME AXETIL 500 MG/1
500 TABLET ORAL 2 TIMES DAILY
Qty: 20 TABLET | Refills: 0 | Status: SHIPPED | OUTPATIENT
Start: 2022-11-09 | End: 2023-01-02

## 2022-11-09 RX ORDER — DULOXETIN HYDROCHLORIDE 60 MG/1
60 CAPSULE, DELAYED RELEASE ORAL DAILY
COMMUNITY
Start: 2022-08-18

## 2022-11-09 RX ADMIN — SODIUM CHLORIDE 500 ML: 0.9 INJECTION, SOLUTION INTRAVENOUS at 02:11

## 2022-11-09 RX ADMIN — CEFTRIAXONE 1 G: 1 INJECTION, SOLUTION INTRAVENOUS at 06:11

## 2022-11-09 NOTE — ED PROVIDER NOTES
Encounter Date: 11/9/2022       History     Chief Complaint   Patient presents with    Fatigue     Fatigue with frequent urination      69-year-old male who has a history of type 2 diabetes, CHF, hyperlipidemia, hypertension, coronary artery disease, who presents accompanied by family members with a history the patient has felt fatigued and weak over the last several days.  The patient's wife states that he has had some urinary frequency for the last week with incontinence and follow to his urine.  He has had no fever.  No complaints of any abdominal pain nausea vomiting.  He does admit to some back pain.  No history any prostatic problems.  Patient also states he has had some diarrhea.  He admits to headache and slight cough.  No chest pain or shortness of breath.  No palpitations.  No dizziness.  No history of anemia.    Review of patient's allergies indicates:  No Known Allergies  Past Medical History:   Diagnosis Date    Acute coronary syndrome     CAD (coronary artery disease)     Cardiomyopathy     Carotid artery occlusion     CHF (congestive heart failure)     Diabetes mellitus, type 2     H/O cataract removal with insertion of prosthetic lens     bilat    Heart murmur     Hypercholesteremia     Hypertension     Old MI (myocardial infarction)     Pneumonia     Pneumonia 6/30/2016    Polio     PUD (peptic ulcer disease)     Sleep apnea     Valvular regurgitation      Past Surgical History:   Procedure Laterality Date    CARDIAC PACEMAKER PLACEMENT      CARDIAC SURGERY      pt states that he had 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    CHOLECYSTECTOMY      COLONOSCOPY      CORONARY ANGIOPLASTY      CORONARY ARTERY BYPASS GRAFT      HERNIA REPAIR      HIP SURGERY Right 07/05/2018    HIP SURGERY Left 11/26/2018    INCISIONAL HERNIA REPAIR  06/05/2017    LAPAROSCOPIC CHOLECYSTECTOMY N/A 9/4/2019    Procedure: CHOLECYSTECTOMY-LAPAROSCOPIC;  Surgeon: Aditya Zeng MD;  Location: [facility];  Service:  General;  Laterality: N/A;    left ventricular  aneurysm repair       TONSILLECTOMY       Family History   Problem Relation Age of Onset    Diabetes Mother     Heart disease Mother     No Known Problems Father      Social History     Tobacco Use    Smoking status: Former    Smokeless tobacco: Former     Types: Chew     Quit date: 7/30/2018   Substance Use Topics    Alcohol use: No    Drug use: No     Review of Systems   Constitutional:  Negative for appetite change, chills, diaphoresis and fever.   HENT:  Negative for congestion, ear pain, rhinorrhea, sinus pain, sore throat and voice change.    Respiratory:  Positive for cough. Negative for shortness of breath, wheezing and stridor.    Cardiovascular:  Negative for chest pain.   Gastrointestinal:  Negative for abdominal pain, constipation, diarrhea, nausea and vomiting.   Genitourinary:  Positive for difficulty urinating and frequency. Negative for flank pain, hematuria, penile discharge, penile pain, penile swelling, scrotal swelling and testicular pain.   Skin:  Negative for pallor, rash and wound.   Neurological:  Positive for weakness. Negative for seizures, syncope and headaches.   All other systems reviewed and are negative.    Physical Exam     Initial Vitals [11/09/22 1223]   BP Pulse Resp Temp SpO2   (!) 91/53 72 18 97.4 °F (36.3 °C) 95 %      MAP       --         Physical Exam    Vitals reviewed.  Constitutional: He appears well-developed and well-nourished. He is not diaphoretic. No distress.   Alert, conversant, appropriatly interactive.   HENT:   Head: Normocephalic and atraumatic.   Right Ear: External ear normal.   Left Ear: External ear normal.   Nose: Nose normal.   Mouth/Throat: Oropharynx is clear and moist.   Eyes: Conjunctivae and EOM are normal. Pupils are equal, round, and reactive to light. Right eye exhibits no discharge. Left eye exhibits no discharge. No scleral icterus.   Neck: Neck supple. No JVD present.   Normal range of  motion.  Cardiovascular:  Normal rate, regular rhythm, normal heart sounds and intact distal pulses.     Exam reveals no gallop and no friction rub.       No murmur heard.  Pulmonary/Chest: Breath sounds normal. No respiratory distress. He has no wheezes. He has no rhonchi. He has no rales. He exhibits no tenderness.   Abdominal: Abdomen is soft. Bowel sounds are normal. He exhibits no distension. There is no abdominal tenderness. There is no rebound and no guarding.   Musculoskeletal:         General: No tenderness or edema. Normal range of motion.      Cervical back: Normal range of motion and neck supple.     Lymphadenopathy:     He has no cervical adenopathy.   Neurological: He is alert and oriented to person, place, and time. He has normal strength and normal reflexes. GCS score is 15. GCS eye subscore is 4. GCS verbal subscore is 5. GCS motor subscore is 6.   Skin: Skin is warm and dry. Capillary refill takes less than 2 seconds. No rash noted. No erythema. No pallor.   Psychiatric: He has a normal mood and affect. His behavior is normal. Judgment and thought content normal.       ED Course   Procedures  Labs Reviewed   CBC W/ AUTO DIFFERENTIAL - Abnormal; Notable for the following components:       Result Value    RBC 3.38 (*)     Hemoglobin 10.5 (*)     Hematocrit 33.3 (*)     MCV 99 (*)     MCH 31.1 (*)     MCHC 31.5 (*)     Gran % 73.4 (*)     Lymph % 14.3 (*)     All other components within normal limits   COMPREHENSIVE METABOLIC PANEL - Abnormal; Notable for the following components:    Sodium 135 (*)     Glucose 184 (*)     BUN 27 (*)     Albumin 2.9 (*)     eGFR 51.4 (*)     All other components within normal limits   URINALYSIS, REFLEX TO URINE CULTURE - Abnormal; Notable for the following components:    Appearance, UA Hazy (*)     Protein, UA 1+ (*)     Ketones, UA Trace (*)     Occult Blood UA 2+ (*)     Nitrite, UA Positive (*)     Urobilinogen, UA 2.0-3.0 (*)     Leukocytes, UA 3+ (*)     All  other components within normal limits    Narrative:     Specimen Source->Urine   URINALYSIS MICROSCOPIC - Abnormal; Notable for the following components:    WBC, UA 99 (*)     Bacteria Moderate (*)     Hyaline Casts, UA 36 (*)     All other components within normal limits    Narrative:     Specimen Source->Urine   CULTURE, URINE          Imaging Results    None          Medications   cefTRIAXone (ROCEPHIN) 1 g/50 mL D5W IVPB (has no administration in time range)   0.9%  NaCl infusion (500 mLs Intravenous New Bag 11/9/22 9123)                Attending Attestation:             Attending ED Notes:   Patient presented with some complain of fatigue and weakness in who has had urinary incontinence recently with urinary frequency but no complaints of any prostatic problems or testicular pain.  Patient's screening labs were reviewed he had no elevated white blood cell count.  Chemistries were stable and unremarkable compared to prior studies.  Urinalysis on cath specimen showed 99 white cells per high-power field with moderate bacteria.  A reflex urine culture is obtained.  The patient is being given Rocephin a g IV in the ED and will be discharged a prescription for Ceftin.  It is suggested that he have repeat urinalysis in 10 days.  He is counseled if he develops any fever severe back pain or suprapubic abdominal pain, nausea vomiting, that he is to return to the hospital were probably require hospitalization.                 Clinical Impression:   Final diagnoses:  [N39.0] Urinary tract infection without hematuria, site unspecified (Primary)      ED Disposition Condition    Discharge Stable          ED Prescriptions       Medication Sig Dispense Start Date End Date Auth. Provider    cefUROXime (CEFTIN) 500 MG tablet Take 1 tablet (500 mg total) by mouth 2 (two) times daily. 20 tablet 11/9/2022 -- Jerson Cooley Jr., MD          Follow-up Information    None          Jerson Cooley Jr., MD  11/09/22 7238

## 2022-11-09 NOTE — ED NOTES
Received lying in bed with eyes open, family @ bedside. Respirations even et unlabored, no distress noted. Plan of care discussed, will continue to monitor.

## 2022-11-09 NOTE — DISCHARGE INSTRUCTIONS
Follow-up with your primary care provider in 10 days for repeat urinalysis.  Take Ceftin twice daily as directed.  Watch for any fever over 100.8°.  Watch for any severe back pain or lower abdominal pain.  Watch for nausea vomiting.  If any problems develop please return to the hospital you will probably require admission for IV antibiotics.  A culture is being obtained of the urine.

## 2022-11-11 LAB — BACTERIA UR CULT: ABNORMAL

## 2023-01-01 ENCOUNTER — CLINICAL SUPPORT (OUTPATIENT)
Dept: CARDIOLOGY | Facility: HOSPITAL | Age: 78
End: 2023-01-01
Payer: MEDICARE

## 2023-01-01 DIAGNOSIS — Z95.810 PRESENCE OF AUTOMATIC (IMPLANTABLE) CARDIAC DEFIBRILLATOR: ICD-10-CM

## 2023-01-01 PROCEDURE — 93295 DEV INTERROG REMOTE 1/2/MLT: CPT | Mod: ,,, | Performed by: INTERNAL MEDICINE

## 2023-01-01 PROCEDURE — 93295 CARDIAC DEVICE CHECK - REMOTE: ICD-10-PCS | Mod: ,,, | Performed by: INTERNAL MEDICINE

## 2023-01-02 ENCOUNTER — OFFICE VISIT (OUTPATIENT)
Dept: URGENT CARE | Facility: CLINIC | Age: 78
End: 2023-01-02
Payer: MEDICARE

## 2023-01-02 VITALS
RESPIRATION RATE: 16 BRPM | TEMPERATURE: 99 F | OXYGEN SATURATION: 94 % | WEIGHT: 203 LBS | HEART RATE: 70 BPM | DIASTOLIC BLOOD PRESSURE: 59 MMHG | HEIGHT: 66 IN | SYSTOLIC BLOOD PRESSURE: 93 MMHG | BODY MASS INDEX: 32.62 KG/M2

## 2023-01-02 DIAGNOSIS — U07.1 COVID-19 VIRUS DETECTED: ICD-10-CM

## 2023-01-02 DIAGNOSIS — J02.9 SORE THROAT: ICD-10-CM

## 2023-01-02 DIAGNOSIS — R05.9 COUGH, UNSPECIFIED TYPE: Primary | ICD-10-CM

## 2023-01-02 DIAGNOSIS — J02.0 STREP PHARYNGITIS: ICD-10-CM

## 2023-01-02 DIAGNOSIS — U07.1 COVID-19: ICD-10-CM

## 2023-01-02 LAB
CTP QC/QA: YES
FLUAV AG NPH QL: NEGATIVE
FLUBV AG NPH QL: NEGATIVE
S PYO RRNA THROAT QL PROBE: POSITIVE
SARS-COV-2 AG RESP QL IA.RAPID: POSITIVE

## 2023-01-02 PROCEDURE — 99204 OFFICE O/P NEW MOD 45 MIN: CPT | Mod: CS,S$GLB,, | Performed by: STUDENT IN AN ORGANIZED HEALTH CARE EDUCATION/TRAINING PROGRAM

## 2023-01-02 PROCEDURE — 87811 SARS CORONAVIRUS 2 ANTIGEN POCT, MANUAL READ: ICD-10-PCS | Mod: QW,CR,S$GLB, | Performed by: STUDENT IN AN ORGANIZED HEALTH CARE EDUCATION/TRAINING PROGRAM

## 2023-01-02 PROCEDURE — 99204 PR OFFICE/OUTPT VISIT, NEW, LEVL IV, 45-59 MIN: ICD-10-PCS | Mod: CS,S$GLB,, | Performed by: STUDENT IN AN ORGANIZED HEALTH CARE EDUCATION/TRAINING PROGRAM

## 2023-01-02 PROCEDURE — 87811 SARS-COV-2 COVID19 W/OPTIC: CPT | Mod: QW,CR,S$GLB, | Performed by: STUDENT IN AN ORGANIZED HEALTH CARE EDUCATION/TRAINING PROGRAM

## 2023-01-02 PROCEDURE — 87880 POCT RAPID STREP A: ICD-10-PCS | Mod: QW,,, | Performed by: STUDENT IN AN ORGANIZED HEALTH CARE EDUCATION/TRAINING PROGRAM

## 2023-01-02 PROCEDURE — 87880 STREP A ASSAY W/OPTIC: CPT | Mod: QW,,, | Performed by: STUDENT IN AN ORGANIZED HEALTH CARE EDUCATION/TRAINING PROGRAM

## 2023-01-02 PROCEDURE — 87804 INFLUENZA ASSAY W/OPTIC: CPT | Mod: QW,,, | Performed by: STUDENT IN AN ORGANIZED HEALTH CARE EDUCATION/TRAINING PROGRAM

## 2023-01-02 PROCEDURE — 87804 POCT INFLUENZA A/B: ICD-10-PCS | Mod: QW,,, | Performed by: STUDENT IN AN ORGANIZED HEALTH CARE EDUCATION/TRAINING PROGRAM

## 2023-01-02 RX ORDER — ALBUTEROL SULFATE 90 UG/1
1-2 AEROSOL, METERED RESPIRATORY (INHALATION) EVERY 6 HOURS PRN
Qty: 18 G | Refills: 0 | Status: SHIPPED | OUTPATIENT
Start: 2023-01-02 | End: 2023-01-25

## 2023-01-02 RX ORDER — FAMOTIDINE 20 MG/1
20 TABLET, FILM COATED ORAL 2 TIMES DAILY
Qty: 20 TABLET | Refills: 0 | Status: SHIPPED | OUTPATIENT
Start: 2023-01-02 | End: 2023-01-12

## 2023-01-02 RX ORDER — CETIRIZINE HYDROCHLORIDE 10 MG/1
10 TABLET ORAL DAILY
Qty: 10 TABLET | Refills: 0 | Status: SHIPPED | OUTPATIENT
Start: 2023-01-02

## 2023-01-02 RX ORDER — AMOXICILLIN 875 MG/1
875 TABLET, FILM COATED ORAL 2 TIMES DAILY
Qty: 20 TABLET | Refills: 0 | Status: SHIPPED | OUTPATIENT
Start: 2023-01-02 | End: 2023-01-12

## 2023-01-02 RX ORDER — BENZONATATE 200 MG/1
200 CAPSULE ORAL 3 TIMES DAILY PRN
Qty: 30 CAPSULE | Refills: 0 | Status: SHIPPED | OUTPATIENT
Start: 2023-01-02 | End: 2023-01-12

## 2023-01-02 NOTE — PROGRESS NOTES
"Subjective:       Patient ID: Moiz Valencia is a 77 y.o. male.    Vitals:  height is 5' 6" (1.676 m) and weight is 92.1 kg (203 lb). His oral temperature is 99 °F (37.2 °C). His blood pressure is 93/59 (abnormal) and his pulse is 70. His respiration is 16 and oxygen saturation is 94% (abnormal).     Chief Complaint: Cough, Sore Throat, and Headache    Patient is a 77-year-old male with a past medical history coronary artery disease, dementia, chronic pain syndrome, depression, cardiomyopathy, hypertension, heart failure, ICD placement, hyperlipidemia, mi, type 2 diabetes, hypothyroidism, arthritis, and sleep apnea who presents to clinic via spouse for evaluation of sore throat.  Patient reports symptoms times 3-4 days.  Patient reports he is vaccinated.  Patient denies any recent or known sick exposures.  Patient reports over-the-counter medications with no relief of symptoms.  Patient reports experiencing fatigue, nasal sinus congestion with postnasal drainage, sore throat with painful swallowing, hoarseness, nonproductive cough, and generalized headaches.  Patient denies any acute dizziness, generalized body aches, fever or chills, ear pain, drooling, chest pain or palpitations, shortness of breath, abdominal pain, nausea or vomiting, diarrhea, rash, or change in mentation.    Cough  This is a new problem. The current episode started in the past 7 days. The cough is Productive of sputum. Associated symptoms include headaches, postnasal drip, rhinorrhea and a sore throat. Pertinent negatives include no chest pain, chills, ear pain, fever, myalgias, rash or shortness of breath. He has tried OTC cough suppressant for the symptoms.   Sore Throat   This is a new problem. The current episode started in the past 7 days. The problem has been unchanged. Associated symptoms include congestion, coughing, headaches, a hoarse voice and trouble swallowing (Painful swallowing). Pertinent negatives include no abdominal pain, " diarrhea, drooling, ear pain, shortness of breath or vomiting.   Headache   This is a new problem. The current episode started in the past 7 days. Associated symptoms include coughing, rhinorrhea and a sore throat. Pertinent negatives include no abdominal pain, dizziness, ear pain, fever, nausea or vomiting.     Constitution: Positive for fatigue. Negative for chills, sweating and fever.   HENT:  Positive for congestion, postnasal drip, sore throat, trouble swallowing (Painful swallowing) and voice change (Hoarseness). Negative for ear pain and drooling.    Neck: neck negative.   Cardiovascular: Negative.  Negative for chest pain and palpitations.   Respiratory:  Positive for cough. Negative for chest tightness, sputum production and shortness of breath.    Gastrointestinal: Negative.  Negative for abdominal pain, nausea, vomiting and diarrhea.   Endocrine: negative.   Genitourinary: Negative.    Musculoskeletal: Negative.  Negative for muscle ache.   Skin: Negative.  Negative for color change, pale, rash and erythema.   Allergic/Immunologic: Negative.    Neurological:  Positive for headaches. Negative for dizziness, light-headedness, passing out, disorientation and altered mental status.   Hematologic/Lymphatic: Negative.    Psychiatric/Behavioral: Negative.  Negative for altered mental status, disorientation and confusion.      Objective:      Physical Exam   Constitutional: He is oriented to person, place, and time. He appears well-developed. He is cooperative.  Non-toxic appearance. He appears ill. No distress.      Comments:Speaks in hoarse voice     HENT:   Head: Normocephalic and atraumatic.   Ears:   Right Ear: Hearing, tympanic membrane, external ear and ear canal normal.   Left Ear: Hearing, tympanic membrane, external ear and ear canal normal.   Nose: Congestion present. No mucosal edema, rhinorrhea or nasal deformity. No epistaxis. Right sinus exhibits no maxillary sinus tenderness and no frontal sinus  tenderness. Left sinus exhibits no maxillary sinus tenderness and no frontal sinus tenderness.   Mouth/Throat: Uvula is midline and mucous membranes are normal. Mucous membranes are moist. No trismus in the jaw. Normal dentition. No uvula swelling. Posterior oropharyngeal erythema and cobblestoning present. No oropharyngeal exudate. Oropharynx is clear.   Eyes: Conjunctivae and lids are normal. Pupils are equal, round, and reactive to light. Right eye exhibits no discharge. Left eye exhibits no discharge. No scleral icterus.   Neck: Trachea normal and phonation normal. Neck supple. No neck rigidity present.   Cardiovascular: Normal rate, regular rhythm and normal pulses.   Murmur heard.  Pulmonary/Chest: Effort normal and breath sounds normal. No respiratory distress (Unlabored.  Equal rise and fall of chest.  Able speak in full complete sentences.  No adventitious breath sounds noted.). He has no wheezes. He has no rhonchi. He has no rales.   Abdominal: Normal appearance and bowel sounds are normal. He exhibits no distension. Soft. There is no abdominal tenderness.   Musculoskeletal: Normal range of motion.         General: No deformity. Normal range of motion.      Cervical back: He exhibits no tenderness.   Lymphadenopathy:     He has no cervical adenopathy.   Neurological: He is alert and oriented to person, place, and time. He exhibits normal muscle tone. Coordination normal.   Skin: Skin is warm, dry, intact, not diaphoretic, not pale and no rash. Capillary refill takes 2 to 3 seconds. No erythema   Psychiatric: His speech is normal and behavior is normal. Judgment and thought content normal.   Nursing note and vitals reviewed.      Assessment:       1. Cough, unspecified type    2. Sore throat    3. COVID-19    4. Strep pharyngitis          Plan:         Cough, unspecified type  -     SARS Coronavirus 2 Antigen, POCT Manual Read  -     POCT Influenza A/B  -     POCT rapid strep A    Sore throat  -     SARS  Coronavirus 2 Antigen, POCT Manual Read  -     POCT Influenza A/B  -     POCT rapid strep A    COVID-19    Strep pharyngitis    Other orders  -     amoxicillin (AMOXIL) 875 MG tablet; Take 1 tablet (875 mg total) by mouth 2 (two) times daily. for 10 days  Dispense: 20 tablet; Refill: 0  -     famotidine (PEPCID) 20 MG tablet; Take 1 tablet (20 mg total) by mouth 2 (two) times daily. for 10 days  Dispense: 20 tablet; Refill: 0  -     cetirizine (ZYRTEC) 10 MG tablet; Take 1 tablet (10 mg total) by mouth once daily.  Dispense: 10 tablet; Refill: 0  -     benzonatate (TESSALON) 200 MG capsule; Take 1 capsule (200 mg total) by mouth 3 (three) times daily as needed for Cough.  Dispense: 30 capsule; Refill: 0  -     albuterol (VENTOLIN HFA) 90 mcg/actuation inhaler; Inhale 1-2 puffs into the lungs every 6 (six) hours as needed for Wheezing or Shortness of Breath. Rescue  Dispense: 18 g; Refill: 0                 Labs:  COVID positive.  Rapid strep positive.  Influenza A and B negative.  Quarantine as directed.  Quarantine information provided to patient.  COVID recommendations provided.  (Vitamin-C, vitamin D3, Pepcid, Zyrtec, zinc)  Tylenol per package instructions for any pain or fever.  May rotate with Motrin if unable to control pain or fever along with Tylenol.  Monitor home SpO2 and present to emergency department with readings less than 92%.  Recommend warm salt water gargles every 2-3 hours while awake.  Recommend throat lozenges as needed for sore throat.    Recommend replacing toothbrush and wash linens 48 hours after starting antibiotics.    Take medications as prescribed.  Assure adequate hydration.  Follow-up with PCP in 1-2 days.  Return to clinic as needed.  To ED for any new or acutely worsening symptoms including but not limited to chest pain, palpitations, shortness of breath, or fever greater than 103° F.  Patient in agreement with plan of care.     DISCLAIMER: Please note that my documentation in this  Electronic Healthcare Record was produced using speech recognition software and therefore may contain errors related to that software system.These could include grammar, punctuation and spelling errors or the inclusion/exclusion of phrases that were not intended. Garbled syntax, mangled pronouns, and other bizarre constructions may be attributed to that software system.

## 2023-04-01 ENCOUNTER — CLINICAL SUPPORT (OUTPATIENT)
Dept: CARDIOLOGY | Facility: HOSPITAL | Age: 78
End: 2023-04-01
Payer: MEDICARE

## 2023-04-01 DIAGNOSIS — Z95.810 PRESENCE OF AUTOMATIC (IMPLANTABLE) CARDIAC DEFIBRILLATOR: ICD-10-CM

## 2023-04-05 ENCOUNTER — OFFICE VISIT (OUTPATIENT)
Dept: CARDIOLOGY | Facility: CLINIC | Age: 78
End: 2023-04-05
Payer: MEDICARE

## 2023-04-05 VITALS
SYSTOLIC BLOOD PRESSURE: 122 MMHG | DIASTOLIC BLOOD PRESSURE: 65 MMHG | WEIGHT: 198.38 LBS | HEIGHT: 66 IN | HEART RATE: 75 BPM | BODY MASS INDEX: 31.88 KG/M2

## 2023-04-05 DIAGNOSIS — I10 PRIMARY HYPERTENSION: Chronic | ICD-10-CM

## 2023-04-05 DIAGNOSIS — E78.00 HYPERCHOLESTEROLEMIA: Chronic | ICD-10-CM

## 2023-04-05 DIAGNOSIS — I25.10 CORONARY ARTERY DISEASE INVOLVING NATIVE CORONARY ARTERY OF NATIVE HEART WITHOUT ANGINA PECTORIS: Chronic | ICD-10-CM

## 2023-04-05 DIAGNOSIS — Z79.899 LONG TERM CURRENT USE OF AMIODARONE: ICD-10-CM

## 2023-04-05 DIAGNOSIS — I70.213 ATHEROSCLEROSIS OF NATIVE ARTERIES OF EXTREMITIES WITH INTERMITTENT CLAUDICATION, BILATERAL LEGS: Chronic | ICD-10-CM

## 2023-04-05 DIAGNOSIS — I34.0 NON-RHEUMATIC MITRAL REGURGITATION: Chronic | ICD-10-CM

## 2023-04-05 DIAGNOSIS — F17.220 TOBACCO DEPENDENCE DUE TO CHEWING TOBACCO: Chronic | ICD-10-CM

## 2023-04-05 DIAGNOSIS — I50.1 LEFT HEART FAILURE: Primary | Chronic | ICD-10-CM

## 2023-04-05 DIAGNOSIS — E66.9 OBESITY, CLASS I, BMI 30-34.9: Chronic | ICD-10-CM

## 2023-04-05 PROBLEM — Z78.9 NON-SMOKER: Status: RESOLVED | Noted: 2017-06-27 | Resolved: 2023-04-05

## 2023-04-05 PROCEDURE — 99214 PR OFFICE/OUTPT VISIT, EST, LEVL IV, 30-39 MIN: ICD-10-PCS | Mod: 25,S$GLB,, | Performed by: INTERNAL MEDICINE

## 2023-04-05 PROCEDURE — 99214 OFFICE O/P EST MOD 30 MIN: CPT | Mod: 25,S$GLB,, | Performed by: INTERNAL MEDICINE

## 2023-04-05 PROCEDURE — 93000 EKG 12-LEAD: ICD-10-PCS | Mod: S$GLB,,, | Performed by: INTERNAL MEDICINE

## 2023-04-05 PROCEDURE — 93000 ELECTROCARDIOGRAM COMPLETE: CPT | Mod: S$GLB,,, | Performed by: INTERNAL MEDICINE

## 2023-04-05 NOTE — PROGRESS NOTES
Subjective:    Patient ID:  Moiz Valencia is a 77 y.o. male who presents for Congestive Heart Failure and Coronary Artery Disease        HPI    LABS FROM NOVEMBER CMP OK HEMOGLOBIN 10.5, ER VISIT FOR URINARY TRACT INFECTION, AICD CHECK OK, NOT ACTIVE B/O PAIN, [facility], , NOT USING CPAP, BETTER, SEE ROS    Past Medical History:   Diagnosis Date    Acute coronary syndrome     CAD (coronary artery disease)     Cardiomyopathy     Carotid artery occlusion     CHF (congestive heart failure)     Diabetes mellitus, type 2     H/O cataract removal with insertion of prosthetic lens     bilat    Heart murmur     Hypercholesteremia     Hypertension     Old MI (myocardial infarction)     Pneumonia     Pneumonia 6/30/2016    Polio     PUD (peptic ulcer disease)     Sleep apnea     Valvular regurgitation      Past Surgical History:   Procedure Laterality Date    CARDIAC PACEMAKER PLACEMENT      CARDIAC SURGERY      pt states that he had 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    CHOLECYSTECTOMY      COLONOSCOPY      CORONARY ANGIOPLASTY      CORONARY ARTERY BYPASS GRAFT      HERNIA REPAIR      HIP SURGERY Right 07/05/2018    HIP SURGERY Left 11/26/2018    INCISIONAL HERNIA REPAIR  06/05/2017    LAPAROSCOPIC CHOLECYSTECTOMY N/A 9/4/2019    Procedure: CHOLECYSTECTOMY-LAPAROSCOPIC;  Surgeon: Aditya Zeng MD;  Location: [facility];  Service: General;  Laterality: N/A;    left ventricular  aneurysm repair       TONSILLECTOMY       Family History   Problem Relation Age of Onset    Diabetes Mother     Heart disease Mother     No Known Problems Father      Social History     Socioeconomic History    Marital status:    Tobacco Use    Smoking status: Former    Smokeless tobacco: Former     Types: Chew     Quit date: 7/30/2018   Substance and Sexual Activity    Alcohol use: No    Drug use: No       Review of patient's allergies indicates:  No Known Allergies    Current Outpatient Medications:     albuterol  (PROVENTIL/VENTOLIN HFA) 90 mcg/actuation inhaler, INHALE 1-2 PUFFS INTO THE LUNGS EVERY 6 HOURS AS NEEDED FOR WHEEZING OR SHORTNESS OF BREATH. RESCUE, Disp: 8.5 g, Rfl: 0    allopurinol (ZYLOPRIM) 300 MG tablet, Take 300 mg by mouth once daily., Disp: , Rfl:     amiodarone (PACERONE) 200 MG Tab, TAKE 1 TABLET(200 MG) BY MOUTH EVERY DAY, Disp: 90 tablet, Rfl: 0    atorvastatin (LIPITOR) 80 MG tablet, TAKE 1 TABLET(80 MG) BY MOUTH EVERY EVENING, Disp: 90 tablet, Rfl: 2    B-complex with vitamin C (Z-BEC OR EQUIV) tablet, Take 1 tablet by mouth once daily., Disp: , Rfl:     blood sugar diagnostic Strp, To check BG 2 times daily, to use with insurance preferred meter, Disp: 100 each, Rfl: 3    blood-glucose meter kit, To check BG 2 times daily, to use with insurance preferred meter, Disp: 1 each, Rfl: 0    cholecalciferol, vitamin D3, (VITAMIN D3) 25 mcg (1,000 unit) capsule, Take 1,000 Units by mouth once daily., Disp: , Rfl:     cyanocobalamin (VITAMIN B-12) 1000 MCG tablet, Take 1,000 mcg by mouth once daily., Disp: , Rfl:     donepezil (ARICEPT) 10 MG tablet, Take 10 mg by mouth once daily., Disp: , Rfl:     DULoxetine (CYMBALTA) 20 MG capsule, Take 60 mg by mouth once daily. , Disp: , Rfl:     DULoxetine (CYMBALTA) 60 MG capsule, Take 60 mg by mouth once daily., Disp: , Rfl:     ENTRESTO 24-26 mg per tablet, TAKE 1 TABLET BY MOUTH TWICE DAILY, Disp: 180 tablet, Rfl: 1    FLUZONE HIGH-DOSE 2019-20, PF, 180 mcg/0.5 mL Syrg, ADM 0.5ML IM UTD, Disp: , Rfl: 0    gabapentin (NEURONTIN) 100 MG capsule, Take 1 capsule (100 mg total) by mouth 2 (two) times daily. (Patient taking differently: Take 300 mg by mouth 2 (two) times daily.), Disp: , Rfl:     HYDROcodone-acetaminophen (NORCO) 7.5-325 mg per tablet, TAKE 1 TABLET BY MOUTH TWICE A DAY AS NEEDED TAKE ONLY AS NEEDED FOR SEVERE PAIN  DO NOT DRINK ALCOHOL OR DRIVE WHILE TAKING THIS MED TAKE ONLY AS NEEDED FOR SEVERE PAIN  DO NOT DRINK ALCOHOL OR DRIVE WHILE TAKING THIS  MED, Disp: , Rfl:     insulin detemir U-100 (LEVEMIR) 100 unit/mL injection, Inject 5 Units into the skin every evening. (Patient taking differently: Inject 8 Units into the skin 2 (two) times a day.), Disp: , Rfl:     lancets Misc, To check BG 2 times daily, to use with insurance preferred meter, Disp: 100 each, Rfl: 3    levothyroxine (SYNTHROID) 50 MCG tablet, Take 50 mcg by mouth before breakfast., Disp: , Rfl:     magnesium oxide (MAG-OX) 400 mg tablet, Take 400 mg by mouth once daily., Disp: , Rfl: 0    memantine (NAMENDA) 10 MG Tab, Take 10 mg by mouth 2 (two) times daily., Disp: , Rfl:     metformin (GLUCOPHAGE) 1000 MG tablet, Take 1,000 mg by mouth 2 (two) times daily with meals., Disp: , Rfl:     mirtazapine (REMERON) 30 MG tablet, Take 15 mg by mouth every evening., Disp: , Rfl:     naproxen (NAPROSYN) 500 MG tablet, Take 500 mg by mouth 2 (two) times daily with meals., Disp: , Rfl:     oxybutynin (DITROPAN-XL) 5 MG TR24, TAKE 1 TABLET(5 MG) BY MOUTH EVERY DAY, Disp: 90 tablet, Rfl: 1    QUEtiapine (SEROQUEL) 100 MG Tab, 50 mg., Disp: , Rfl:     QUEtiapine (SEROQUEL) 200 MG Tab, Take 100-200 mg by mouth nightly as needed., Disp: , Rfl:     UNABLE TO FIND, Take 1 capsule by mouth once daily. medication name: tumeric, Disp: , Rfl:     UNABLE TO FIND, Take 1 capsule by mouth once daily. medication name: vitamin k, Disp: , Rfl:     zinc gluconate 50 mg tablet, Take 50 mg by mouth once daily., Disp: , Rfl:     aspirin (ECOTRIN) 81 MG EC tablet, Take 1 tablet (81 mg total) by mouth once daily., Disp: , Rfl: 0    cetirizine (ZYRTEC) 10 MG tablet, Take 1 tablet (10 mg total) by mouth once daily., Disp: 10 tablet, Rfl: 0    famotidine (PEPCID) 20 MG tablet, Take 1 tablet (20 mg total) by mouth 2 (two) times daily. for 10 days, Disp: 20 tablet, Rfl: 0    Review of Systems   Constitutional: Positive for weight loss (DIET). Negative for chills, diaphoresis, fever and night sweats.   HENT:  Negative for congestion  "and nosebleeds.    Eyes:  Negative for blurred vision and visual disturbance.   Cardiovascular:  Positive for dyspnea on exertion (MILD). Negative for chest pain, claudication, cyanosis, irregular heartbeat, leg swelling, near-syncope, orthopnea, palpitations, paroxysmal nocturnal dyspnea and syncope.   Respiratory:  Negative for cough, hemoptysis, shortness of breath and wheezing.    Endocrine: Negative.         BG OK, METFORMIN DECREASED   Hematologic/Lymphatic: Negative for adenopathy. Does not bruise/bleed easily.   Skin:  Negative for color change and itching.   Musculoskeletal:  Positive for arthritis and back pain (CHRONIC, LOWER). Negative for falls. Muscle weakness: LEGS.  Gastrointestinal:  Negative for abdominal pain, change in bowel habit, dysphagia, jaundice, melena and nausea.   Genitourinary:  Negative for dysuria and flank pain.   Neurological:  Negative for brief paralysis, focal weakness, headaches, light-headedness, loss of balance (LESS) and weakness.   Psychiatric/Behavioral:  Negative for altered mental status and depression. Memory loss: SOME , DEMENTIA.   Allergic/Immunologic: Negative for hives and persistent infections.      Objective:      Vitals:    04/05/23 1320   BP: 122/65   Pulse: 75   Weight: 90 kg (198 lb 5.9 oz)   Height: 5' 6" (1.676 m)   PainSc: 0-No pain     Body mass index is 32.02 kg/m².    Physical Exam  Constitutional:       Appearance: He is well-developed. He is obese.   HENT:      Head: Normocephalic and atraumatic.   Eyes:      General: No scleral icterus.     Extraocular Movements: Extraocular movements intact.   Neck:      Vascular: Normal carotid pulses. No carotid bruit or JVD.   Cardiovascular:      Rate and Rhythm: Normal rate and regular rhythm. No extrasystoles are present.     Pulses:           Carotid pulses are 2+ on the right side and 2+ on the left side.       Radial pulses are 2+ on the right side and 2+ on the left side.        Posterior tibial pulses are " 2+ on the right side and 2+ on the left side.      Heart sounds: Murmur heard.   Systolic murmur is present with a grade of 1/6 at the lower left sternal border.     No friction rub. No gallop.   Pulmonary:      Effort: Pulmonary effort is normal.      Breath sounds: Normal breath sounds and air entry. No rales.   Chest:          Comments: STERNOTOMY SCAR, AICD  Abdominal:      Palpations: Abdomen is soft. There is no hepatomegaly.   Musculoskeletal:         General: Normal range of motion.      Cervical back: Neck supple.      Right lower leg: No edema.      Left lower leg: No edema.      Comments: SLOW GAIT   Skin:     General: Skin is warm and dry.      Capillary Refill: Capillary refill takes less than 2 seconds.   Neurological:      Mental Status: He is alert and oriented to person, place, and time.      Cranial Nerves: Cranial nerves 2-12 are intact.   Psychiatric:         Mood and Affect: Mood normal.         Speech: Speech normal.         Behavior: Behavior normal.               ..    Chemistry        Component Value Date/Time     (L) 11/09/2022 1407    K 5.1 11/09/2022 1407     11/09/2022 1407    CO2 25 11/09/2022 1407    BUN 27 (H) 11/09/2022 1407    CREATININE 1.4 11/09/2022 1407     (H) 11/09/2022 1407        Component Value Date/Time    CALCIUM 8.9 11/09/2022 1407    ALKPHOS 70 11/09/2022 1407    AST 15 11/09/2022 1407    AST 15 (L) 11/10/2015 0819    ALT 16 11/09/2022 1407    BILITOT 0.7 11/09/2022 1407    ESTGFRAFRICA >60 11/19/2021 1101    EGFRNONAA >60 11/19/2021 1101            ..  Lab Results   Component Value Date    CHOL 139 08/11/2022    CHOL 113 (L) 03/15/2021    CHOL 124 11/18/2020     Lab Results   Component Value Date    HDL 54 08/11/2022    HDL 50 03/15/2021    HDL 60 11/18/2020     Lab Results   Component Value Date    LDLCALC 73.8 08/11/2022    LDLCALC 52.8 (L) 03/15/2021    LDLCALC 56.8 (L) 11/18/2020     Lab Results   Component Value Date    TRIG 56 08/11/2022     TRIG 51 03/15/2021    TRIG 36 11/18/2020     Lab Results   Component Value Date    CHOLHDL 38.8 08/11/2022    CHOLHDL 44.2 03/15/2021    CHOLHDL 48.4 11/18/2020     ..  Lab Results   Component Value Date    WBC 8.72 11/09/2022    HGB 10.5 (L) 11/09/2022    HCT 33.3 (L) 11/09/2022    MCV 99 (H) 11/09/2022     11/09/2022       Test(s) Reviewed  I have reviewed the following in detail:  [] Stress test   [] Angiography   [] Echocardiogram   [x] Labs   [x] Other:       Assessment:         ICD-10-CM ICD-9-CM   1. Left heart failure  I50.1 428.1   2. Long term current use of amiodarone  Z79.899 V58.69   3. Coronary artery disease involving native coronary artery of native heart without angina pectoris  I25.10 414.01   4. Primary hypertension  I10 401.9   5. Atherosclerosis of native arteries of extremities with intermittent claudication, bilateral legs  I70.213 440.21   6. Obesity, Class I, BMI 30-34.9  E66.9 278.00   7. Non-rheumatic mitral regurgitation  I34.0 424.0   8. Hypercholesterolemia  E78.00 272.0   9. Tobacco dependence due to chewing tobacco  F17.220 305.1     Problem List Items Addressed This Visit          Cardiac/Vascular    Coronary artery disease involving native coronary artery of native heart without angina pectoris    Hypertension    Relevant Orders    IN OFFICE EKG 12-LEAD (to Muse)    Left heart failure - Primary    Relevant Orders    IN OFFICE EKG 12-LEAD (to Stanton)    Comprehensive Metabolic Panel    Long term current use of amiodarone    Relevant Orders    Comprehensive Metabolic Panel    TSH    Hypercholesterolemia    Relevant Orders    Comprehensive Metabolic Panel    Non-rheumatic mitral regurgitation    Atherosclerosis of native arteries of extremities with intermittent claudication, bilateral legs    Relevant Orders    Comprehensive Metabolic Panel       Endocrine    Obesity, Class I, BMI 30-34.9       Other    Tobacco dependence due to chewing tobacco        Plan:     EKG SR, LBBB,  TOBACCO CESSATION COUNSELING, DAILY WEIGHT 2 LB PER DAY 5 LB PER WEEK RULE EXPLAINED, LIMITED BY HIS BACK NOT A GOOD CANDIDATE FOR SURGERY AND HE DOES NOT WANT 1, [facility], CLASS 2 HEART FAILURE NO ANGINA NO TIA TYPE SYMPTOMS STABLE ARRHYTHMIA NO RECURRENCE NO AICD SHOCK DIET EXERCISE AS MUCH AS POSSIBLE WEIGHT LOSS, RETURN TO CLINIC IN, 6 MO WITH LABS, DISCUSSED PLAN WITH THE PATIENT AND HIS WIFE      Left heart failure  -     IN OFFICE EKG 12-LEAD (to Muse)  -     Comprehensive Metabolic Panel; Future; Expected date: 10/05/2023    Long term current use of amiodarone  -     Comprehensive Metabolic Panel; Future; Expected date: 10/05/2023  -     TSH; Future; Expected date: 10/05/2023    Coronary artery disease involving native coronary artery of native heart without angina pectoris    Primary hypertension  -     IN OFFICE EKG 12-LEAD (to Muse)    Atherosclerosis of native arteries of extremities with intermittent claudication, bilateral legs  -     Comprehensive Metabolic Panel; Future; Expected date: 10/05/2023    Obesity, Class I, BMI 30-34.9    Non-rheumatic mitral regurgitation    Hypercholesterolemia  -     Comprehensive Metabolic Panel; Future; Expected date: 10/05/2023    Tobacco dependence due to chewing tobacco    RTC Low level/low impact aerobic exercise 5x's/wk. Heart healthy diet and risk factor modification.    See labs and med orders.    Aerobic exercise 5x's/wk. Heart healthy diet and risk factor modification.    See labs and med orders.

## 2023-06-12 RX ORDER — AMIODARONE HYDROCHLORIDE 200 MG/1
TABLET ORAL
Qty: 90 TABLET | Refills: 0 | Status: SHIPPED | OUTPATIENT
Start: 2023-06-12 | End: 2023-09-13

## 2023-07-05 ENCOUNTER — TELEPHONE (OUTPATIENT)
Dept: FAMILY MEDICINE | Facility: CLINIC | Age: 78
End: 2023-07-05
Payer: MEDICARE

## 2023-08-08 ENCOUNTER — CLINICAL SUPPORT (OUTPATIENT)
Dept: CARDIOLOGY | Facility: HOSPITAL | Age: 78
End: 2023-08-08
Payer: MEDICARE

## 2023-08-08 DIAGNOSIS — Z95.810 PRESENCE OF AUTOMATIC (IMPLANTABLE) CARDIAC DEFIBRILLATOR: ICD-10-CM

## 2023-08-08 PROCEDURE — 93295 CARDIAC DEVICE CHECK - REMOTE: ICD-10-PCS | Mod: ,,, | Performed by: INTERNAL MEDICINE

## 2023-08-08 PROCEDURE — 93295 DEV INTERROG REMOTE 1/2/MLT: CPT | Mod: ,,, | Performed by: INTERNAL MEDICINE

## 2023-09-07 ENCOUNTER — TELEPHONE (OUTPATIENT)
Dept: CARDIOLOGY | Facility: HOSPITAL | Age: 78
End: 2023-09-07
Payer: MEDICARE

## 2023-09-12 DIAGNOSIS — Z95.810 PRESENCE OF AUTOMATIC (IMPLANTABLE) CARDIAC DEFIBRILLATOR: Primary | ICD-10-CM

## 2023-09-13 RX ORDER — AMIODARONE HYDROCHLORIDE 200 MG/1
TABLET ORAL
Qty: 90 TABLET | Refills: 0 | Status: SHIPPED | OUTPATIENT
Start: 2023-09-13 | End: 2023-12-15

## 2023-09-23 DIAGNOSIS — I50.1 LEFT HEART FAILURE: ICD-10-CM

## 2023-09-25 RX ORDER — SACUBITRIL AND VALSARTAN 24; 26 MG/1; MG/1
TABLET, FILM COATED ORAL
Qty: 180 TABLET | Refills: 0 | Status: SHIPPED | OUTPATIENT
Start: 2023-09-25 | End: 2024-03-25

## 2023-11-01 ENCOUNTER — CLINICAL SUPPORT (OUTPATIENT)
Dept: CARDIOLOGY | Facility: HOSPITAL | Age: 78
End: 2023-11-01
Attending: INTERNAL MEDICINE
Payer: MEDICARE

## 2023-11-01 DIAGNOSIS — Z95.810 PRESENCE OF AUTOMATIC (IMPLANTABLE) CARDIAC DEFIBRILLATOR: ICD-10-CM

## 2023-11-01 PROCEDURE — 93282 PRGRMG EVAL IMPLANTABLE DFB: CPT | Mod: PO

## 2023-11-01 PROCEDURE — 93282 PRGRMG EVAL IMPLANTABLE DFB: CPT | Mod: 26,,, | Performed by: INTERNAL MEDICINE

## 2023-11-01 PROCEDURE — 93282 CARDIAC DEVICE CHECK - IN CLINIC & HOSPITAL: ICD-10-PCS | Mod: 26,,, | Performed by: INTERNAL MEDICINE

## 2023-11-07 ENCOUNTER — LAB VISIT (OUTPATIENT)
Dept: PRIMARY CARE CLINIC | Facility: CLINIC | Age: 78
End: 2023-11-07
Payer: MEDICARE

## 2023-11-07 DIAGNOSIS — Z79.899 LONG TERM CURRENT USE OF AMIODARONE: ICD-10-CM

## 2023-11-07 DIAGNOSIS — I50.1 LEFT HEART FAILURE: Chronic | ICD-10-CM

## 2023-11-07 DIAGNOSIS — I70.213 ATHEROSCLEROSIS OF NATIVE ARTERIES OF EXTREMITIES WITH INTERMITTENT CLAUDICATION, BILATERAL LEGS: Chronic | ICD-10-CM

## 2023-11-07 DIAGNOSIS — E78.00 HYPERCHOLESTEROLEMIA: Chronic | ICD-10-CM

## 2023-11-07 LAB
ALBUMIN SERPL BCP-MCNC: 3.6 G/DL (ref 3.5–5.2)
ALP SERPL-CCNC: 69 U/L (ref 55–135)
ALT SERPL W/O P-5'-P-CCNC: 10 U/L (ref 10–44)
ANION GAP SERPL CALC-SCNC: 8 MMOL/L (ref 8–16)
AST SERPL-CCNC: 14 U/L (ref 10–40)
BILIRUB SERPL-MCNC: 0.3 MG/DL (ref 0.1–1)
BUN SERPL-MCNC: 21 MG/DL (ref 8–23)
CALCIUM SERPL-MCNC: 9.5 MG/DL (ref 8.7–10.5)
CHLORIDE SERPL-SCNC: 107 MMOL/L (ref 95–110)
CO2 SERPL-SCNC: 28 MMOL/L (ref 23–29)
CREAT SERPL-MCNC: 1.3 MG/DL (ref 0.5–1.4)
EST. GFR  (NO RACE VARIABLE): 55.9 ML/MIN/1.73 M^2
GLUCOSE SERPL-MCNC: 104 MG/DL (ref 70–110)
POTASSIUM SERPL-SCNC: 4.3 MMOL/L (ref 3.5–5.1)
PROT SERPL-MCNC: 6.6 G/DL (ref 6–8.4)
SODIUM SERPL-SCNC: 143 MMOL/L (ref 136–145)
T4 FREE SERPL-MCNC: 0.62 NG/DL (ref 0.71–1.51)
TSH SERPL DL<=0.005 MIU/L-ACNC: 19.77 UIU/ML (ref 0.4–4)

## 2023-11-07 PROCEDURE — 80053 COMPREHEN METABOLIC PANEL: CPT | Performed by: INTERNAL MEDICINE

## 2023-11-07 PROCEDURE — 84443 ASSAY THYROID STIM HORMONE: CPT | Performed by: INTERNAL MEDICINE

## 2023-11-07 PROCEDURE — 36415 COLL VENOUS BLD VENIPUNCTURE: CPT | Mod: S$GLB,,, | Performed by: INTERNAL MEDICINE

## 2023-11-07 PROCEDURE — 36415 PR COLLECTION VENOUS BLOOD,VENIPUNCTURE: ICD-10-PCS | Mod: S$GLB,,, | Performed by: INTERNAL MEDICINE

## 2023-11-07 PROCEDURE — 84439 ASSAY OF FREE THYROXINE: CPT | Performed by: INTERNAL MEDICINE

## 2023-11-08 ENCOUNTER — CLINICAL SUPPORT (OUTPATIENT)
Dept: CARDIOLOGY | Facility: HOSPITAL | Age: 78
End: 2023-11-08
Attending: INTERNAL MEDICINE
Payer: MEDICARE

## 2023-11-08 ENCOUNTER — OFFICE VISIT (OUTPATIENT)
Dept: CARDIOLOGY | Facility: CLINIC | Age: 78
End: 2023-11-08
Payer: MEDICARE

## 2023-11-08 VITALS
BODY MASS INDEX: 34.4 KG/M2 | DIASTOLIC BLOOD PRESSURE: 55 MMHG | HEIGHT: 66 IN | WEIGHT: 214.06 LBS | SYSTOLIC BLOOD PRESSURE: 112 MMHG | HEART RATE: 61 BPM

## 2023-11-08 DIAGNOSIS — E78.00 HYPERCHOLESTEROLEMIA: Chronic | ICD-10-CM

## 2023-11-08 DIAGNOSIS — F17.220 TOBACCO DEPENDENCE DUE TO CHEWING TOBACCO: Chronic | ICD-10-CM

## 2023-11-08 DIAGNOSIS — I34.0 NON-RHEUMATIC MITRAL REGURGITATION: Chronic | ICD-10-CM

## 2023-11-08 DIAGNOSIS — I70.213 ATHEROSCLEROSIS OF NATIVE ARTERIES OF EXTREMITIES WITH INTERMITTENT CLAUDICATION, BILATERAL LEGS: Chronic | ICD-10-CM

## 2023-11-08 DIAGNOSIS — Z79.899 LONG TERM CURRENT USE OF AMIODARONE: Chronic | ICD-10-CM

## 2023-11-08 DIAGNOSIS — E03.9 ACQUIRED HYPOTHYROIDISM: Chronic | ICD-10-CM

## 2023-11-08 DIAGNOSIS — I50.1 LEFT HEART FAILURE: Primary | Chronic | ICD-10-CM

## 2023-11-08 DIAGNOSIS — I25.10 CORONARY ARTERY DISEASE INVOLVING NATIVE CORONARY ARTERY OF NATIVE HEART WITHOUT ANGINA PECTORIS: Chronic | ICD-10-CM

## 2023-11-08 PROCEDURE — 99214 OFFICE O/P EST MOD 30 MIN: CPT | Mod: S$GLB,,, | Performed by: INTERNAL MEDICINE

## 2023-11-08 PROCEDURE — 99214 PR OFFICE/OUTPT VISIT, EST, LEVL IV, 30-39 MIN: ICD-10-PCS | Mod: S$GLB,,, | Performed by: INTERNAL MEDICINE

## 2023-11-08 PROCEDURE — 93295 DEV INTERROG REMOTE 1/2/MLT: CPT | Mod: ,,, | Performed by: INTERNAL MEDICINE

## 2023-11-08 PROCEDURE — 93295 CARDIAC DEVICE CHECK - REMOTE: ICD-10-PCS | Mod: ,,, | Performed by: INTERNAL MEDICINE

## 2023-11-08 RX ORDER — LEVOTHYROXINE SODIUM 75 UG/1
75 TABLET ORAL
Qty: 90 TABLET | Refills: 1 | Status: SHIPPED | OUTPATIENT
Start: 2023-11-08 | End: 2024-11-07

## 2023-11-08 NOTE — PROGRESS NOTES
Subjective:    Patient ID:  Moiz Valencia is a 78 y.o. male who presents for Left heart failure        HPI    DISCUSSED LABS AND GOALS TSH ELEVATED FREE T4 SLIGHTLY LOW CMP WITH GFR OF 56, AICD CHECK OK, OVERALL FEELS GOOD EXCEPT FOR BACK, SEE ROS  Past Medical History:   Diagnosis Date    Acute coronary syndrome     CAD (coronary artery disease)     Cardiomyopathy     Carotid artery occlusion     CHF (congestive heart failure)     Diabetes mellitus, type 2     H/O cataract removal with insertion of prosthetic lens     bilat    Heart murmur     Hypercholesteremia     Hypertension     Old MI (myocardial infarction)     Pneumonia     Pneumonia 6/30/2016    Polio     PUD (peptic ulcer disease)     Sleep apnea     Valvular regurgitation      Past Surgical History:   Procedure Laterality Date    CARDIAC PACEMAKER PLACEMENT      CARDIAC SURGERY      pt states that he had 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    CHOLECYSTECTOMY      COLONOSCOPY      CORONARY ANGIOPLASTY      CORONARY ARTERY BYPASS GRAFT      HERNIA REPAIR      HIP SURGERY Right 07/05/2018    HIP SURGERY Left 11/26/2018    INCISIONAL HERNIA REPAIR  06/05/2017    LAPAROSCOPIC CHOLECYSTECTOMY N/A 9/4/2019    Procedure: CHOLECYSTECTOMY-LAPAROSCOPIC;  Surgeon: Aditya Zeng MD;  Location: [facility];  Service: General;  Laterality: N/A;    left ventricular  aneurysm repair       TONSILLECTOMY       Family History   Problem Relation Age of Onset    Diabetes Mother     Heart disease Mother     No Known Problems Father      Social History     Socioeconomic History    Marital status:    Tobacco Use    Smoking status: Former    Smokeless tobacco: Former     Types: Chew     Quit date: 7/30/2018   Substance and Sexual Activity    Alcohol use: No    Drug use: No       Review of patient's allergies indicates:  No Known Allergies    Current Outpatient Medications:     albuterol (PROVENTIL/VENTOLIN HFA) 90 mcg/actuation inhaler, INHALE 1-2 PUFFS INTO  THE LUNGS EVERY 6 HOURS AS NEEDED FOR WHEEZING OR SHORTNESS OF BREATH. RESCUE, Disp: 8.5 g, Rfl: 0    allopurinol (ZYLOPRIM) 300 MG tablet, Take 300 mg by mouth once daily., Disp: , Rfl:     amiodarone (PACERONE) 200 MG Tab, TAKE 1 TABLET(200 MG) BY MOUTH EVERY DAY, Disp: 90 tablet, Rfl: 0    aspirin (ECOTRIN) 81 MG EC tablet, Take 1 tablet (81 mg total) by mouth once daily., Disp: , Rfl: 0    atorvastatin (LIPITOR) 80 MG tablet, TAKE 1 TABLET(80 MG) BY MOUTH EVERY EVENING, Disp: 90 tablet, Rfl: 2    B-complex with vitamin C (Z-BEC OR EQUIV) tablet, Take 1 tablet by mouth once daily., Disp: , Rfl:     blood sugar diagnostic Strp, To check BG 2 times daily, to use with insurance preferred meter, Disp: 100 each, Rfl: 3    cetirizine (ZYRTEC) 10 MG tablet, Take 1 tablet (10 mg total) by mouth once daily., Disp: 10 tablet, Rfl: 0    cholecalciferol, vitamin D3, (VITAMIN D3) 25 mcg (1,000 unit) capsule, Take 1,000 Units by mouth once daily., Disp: , Rfl:     cyanocobalamin (VITAMIN B-12) 1000 MCG tablet, Take 1,000 mcg by mouth once daily., Disp: , Rfl:     donepezil (ARICEPT) 10 MG tablet, Take 10 mg by mouth once daily., Disp: , Rfl:     DULoxetine (CYMBALTA) 20 MG capsule, Take 60 mg by mouth once daily. , Disp: , Rfl:     DULoxetine (CYMBALTA) 60 MG capsule, Take 60 mg by mouth once daily., Disp: , Rfl:     FLUZONE HIGH-DOSE 2019-20, PF, 180 mcg/0.5 mL Syrg, ADM 0.5ML IM UTD, Disp: , Rfl: 0    gabapentin (NEURONTIN) 100 MG capsule, Take 1 capsule (100 mg total) by mouth 2 (two) times daily. (Patient taking differently: Take 300 mg by mouth 2 (two) times daily.), Disp: , Rfl:     HYDROcodone-acetaminophen (NORCO) 7.5-325 mg per tablet, TAKE 1 TABLET BY MOUTH TWICE A DAY AS NEEDED TAKE ONLY AS NEEDED FOR SEVERE PAIN  DO NOT DRINK ALCOHOL OR DRIVE WHILE TAKING THIS MED TAKE ONLY AS NEEDED FOR SEVERE PAIN  DO NOT DRINK ALCOHOL OR DRIVE WHILE TAKING THIS MED, Disp: , Rfl:     insulin detemir U-100 (LEVEMIR) 100 unit/mL  injection, Inject 5 Units into the skin every evening. (Patient taking differently: Inject 8 Units into the skin 2 (two) times a day.), Disp: , Rfl:     lancets Misc, To check BG 2 times daily, to use with insurance preferred meter, Disp: 100 each, Rfl: 3    magnesium oxide (MAG-OX) 400 mg tablet, Take 400 mg by mouth once daily., Disp: , Rfl: 0    memantine (NAMENDA) 10 MG Tab, Take 10 mg by mouth 2 (two) times daily., Disp: , Rfl:     metformin (GLUCOPHAGE) 1000 MG tablet, Take 1,000 mg by mouth 2 (two) times daily with meals., Disp: , Rfl:     mirtazapine (REMERON) 30 MG tablet, Take 15 mg by mouth every evening., Disp: , Rfl:     naproxen (NAPROSYN) 500 MG tablet, Take 500 mg by mouth 2 (two) times daily with meals., Disp: , Rfl:     oxybutynin (DITROPAN-XL) 5 MG TR24, TAKE 1 TABLET(5 MG) BY MOUTH EVERY DAY, Disp: 90 tablet, Rfl: 1    QUEtiapine (SEROQUEL) 100 MG Tab, 50 mg., Disp: , Rfl:     QUEtiapine (SEROQUEL) 200 MG Tab, Take 100-200 mg by mouth nightly as needed., Disp: , Rfl:     sacubitriL-valsartan (ENTRESTO) 24-26 mg per tablet, TAKE 1 TABLET BY MOUTH TWICE DAILY, Disp: 180 tablet, Rfl: 0    UNABLE TO FIND, Take 1 capsule by mouth once daily. medication name: tumeric, Disp: , Rfl:     UNABLE TO FIND, Take 1 capsule by mouth once daily. medication name: vitamin k, Disp: , Rfl:     zinc gluconate 50 mg tablet, Take 50 mg by mouth once daily., Disp: , Rfl:     blood-glucose meter kit, To check BG 2 times daily, to use with insurance preferred meter, Disp: 1 each, Rfl: 0    famotidine (PEPCID) 20 MG tablet, Take 1 tablet (20 mg total) by mouth 2 (two) times daily. for 10 days, Disp: 20 tablet, Rfl: 0    levothyroxine (SYNTHROID) 75 MCG tablet, Take 1 tablet (75 mcg total) by mouth before breakfast., Disp: 90 tablet, Rfl: 1    Review of Systems   Constitutional: Negative for chills, diaphoresis, fever, night sweats and weight gain (SOME).   HENT:  Negative for congestion and nosebleeds.    Eyes:  Negative  "for blurred vision and visual disturbance.   Cardiovascular:  Negative for chest pain, claudication, cyanosis, dyspnea on exertion (MILD), irregular heartbeat, leg swelling, near-syncope, orthopnea, palpitations, paroxysmal nocturnal dyspnea and syncope.   Respiratory:  Negative for cough, hemoptysis, shortness of breath and wheezing.    Endocrine: Negative.         BG OK, METFORMIN DECREASED   Hematologic/Lymphatic: Negative for adenopathy. Does not bruise/bleed easily.   Skin:  Negative for color change and itching.   Musculoskeletal:  Positive for back pain (CHRONIC, LOWER). Negative for falls. Muscle weakness: LEGS.  Gastrointestinal:  Negative for abdominal pain, change in bowel habit, dysphagia, jaundice, melena and nausea.   Genitourinary:  Negative for dysuria and flank pain.   Neurological:  Negative for brief paralysis, focal weakness, headaches, light-headedness, loss of balance (OCC) and weakness.   Psychiatric/Behavioral:  Negative for altered mental status, depression and memory loss (SOME , DEMENTIA).    Allergic/Immunologic: Negative for hives and persistent infections.        Objective:      Vitals:    11/08/23 1322   BP: (!) 112/55   Pulse: 61   Weight: 97.1 kg (214 lb 1.1 oz)   Height: 5' 6" (1.676 m)   PainSc: 0-No pain     Body mass index is 34.55 kg/m².    Physical Exam  Constitutional:       Appearance: He is well-developed. He is obese.   HENT:      Head: Normocephalic and atraumatic.   Eyes:      Extraocular Movements: Extraocular movements intact.      Conjunctiva/sclera: Conjunctivae normal.   Neck:      Vascular: Normal carotid pulses. No carotid bruit or JVD.   Cardiovascular:      Rate and Rhythm: Normal rate and regular rhythm. No extrasystoles are present.     Pulses:           Carotid pulses are 2+ on the right side and 2+ on the left side.       Radial pulses are 2+ on the right side and 2+ on the left side.        Posterior tibial pulses are 2+ on the right side and 2+ on the left " side.      Heart sounds: Murmur heard.      Systolic murmur is present with a grade of 1/6 at the lower left sternal border and apex.      No friction rub. No gallop.   Pulmonary:      Effort: Pulmonary effort is normal. No respiratory distress.      Breath sounds: Normal breath sounds. No rales.   Chest:          Comments: STERNOTOMY SCAR, AICD  Abdominal:      Palpations: Abdomen is soft. There is no hepatomegaly.   Musculoskeletal:         General: Normal range of motion.      Cervical back: Neck supple.      Right lower leg: No edema.      Left lower leg: No edema.      Comments: SLOW GAIT   Skin:     General: Skin is warm and dry.      Capillary Refill: Capillary refill takes less than 2 seconds.   Neurological:      General: No focal deficit present.      Mental Status: He is alert and oriented to person, place, and time.      Cranial Nerves: Cranial nerves 2-12 are intact. Cranial nerve deficit: [place].   Psychiatric:         Mood and Affect: Mood normal.         Speech: Speech normal.         Behavior: Behavior normal.                 ..    Chemistry        Component Value Date/Time     11/07/2023 0844    K 4.3 11/07/2023 0844     11/07/2023 0844    CO2 28 11/07/2023 0844    BUN 21 11/07/2023 0844    CREATININE 1.3 11/07/2023 0844     11/07/2023 0844        Component Value Date/Time    CALCIUM 9.5 11/07/2023 0844    ALKPHOS 69 11/07/2023 0844    AST 14 11/07/2023 0844    AST 15 (L) 11/10/2015 0819    ALT 10 11/07/2023 0844    BILITOT 0.3 11/07/2023 0844    ESTGFRAFRICA >60 11/19/2021 1101    EGFRNONAA >60 11/19/2021 1101            ..  Lab Results   Component Value Date    CHOL 139 08/11/2022    CHOL 113 (L) 03/15/2021    CHOL 124 11/18/2020     Lab Results   Component Value Date    HDL 54 08/11/2022    HDL 50 03/15/2021    HDL 60 11/18/2020     Lab Results   Component Value Date    LDLCALC 73.8 08/11/2022    LDLCALC 52.8 (L) 03/15/2021    LDLCALC 56.8 (L) 11/18/2020     Lab Results    Component Value Date    TRIG 56 08/11/2022    TRIG 51 03/15/2021    TRIG 36 11/18/2020     Lab Results   Component Value Date    CHOLHDL 38.8 08/11/2022    CHOLHDL 44.2 03/15/2021    CHOLHDL 48.4 11/18/2020     ..  Lab Results   Component Value Date    WBC 8.72 11/09/2022    HGB 10.5 (L) 11/09/2022    HCT 33.3 (L) 11/09/2022    MCV 99 (H) 11/09/2022     11/09/2022       Test(s) Reviewed  I have reviewed the following in detail:  [] Stress test   [] Angiography   [] Echocardiogram   [x] Labs   [x] Other:       Assessment:         ICD-10-CM ICD-9-CM   1. Left heart failure  I50.1 428.1   2. Coronary artery disease involving native coronary artery of native heart without angina pectoris  I25.10 414.01   3. Tobacco dependence due to chewing tobacco  F17.220 305.1   4. Long term current use of amiodarone  Z79.899 V58.69   5. Acquired hypothyroidism  E03.9 244.9   6. Atherosclerosis of native arteries of extremities with intermittent claudication, bilateral legs  I70.213 440.21   7. Non-rheumatic mitral regurgitation  I34.0 424.0   8. Hypercholesterolemia  E78.00 272.0     Problem List Items Addressed This Visit          Cardiac/Vascular    Coronary artery disease involving native coronary artery of native heart without angina pectoris    Relevant Orders    Echo    Comprehensive Metabolic Panel    Lipid Panel    Left heart failure - Primary    Relevant Orders    Echo    Comprehensive Metabolic Panel    BNP    Long term current use of amiodarone    Relevant Orders    TSH    T3    T4    Hypercholesterolemia    Relevant Orders    Comprehensive Metabolic Panel    Lipid Panel    Non-rheumatic mitral regurgitation    Relevant Orders    Echo    Atherosclerosis of native arteries of extremities with intermittent claudication, bilateral legs       Endocrine    Hypothyroidism    Relevant Orders    TSH    T3    T4       Other    Tobacco dependence due to chewing tobacco        Plan:     INCREASE LEVOTHYROXINE,TO 75, MCG  CHECK ECHO REASSESS EF, DAILY WEIGHT 2 LB PER DAY 5 LB PER WEEK RULE EXPLAINED, NO ANGINA CLASS 2 HEART FAILURE NO CLINICAL ARRHYTHMIA DIET EXERCISE INCREASE ACTIVITY WEIGHT LOSS, DISCUSSED PLAN WITH THE PATIENT AND HIS WIFE, RETURN TO CLINIC IN 6 MONTHS WITH LABS      Left heart failure  -     Echo  -     Comprehensive Metabolic Panel; Future; Expected date: 05/08/2024  -     BNP; Future; Expected date: 05/08/2024    Coronary artery disease involving native coronary artery of native heart without angina pectoris  -     Echo  -     Comprehensive Metabolic Panel; Future; Expected date: 05/08/2024  -     Lipid Panel; Future; Expected date: 05/08/2024    Tobacco dependence due to chewing tobacco  Comments:  COUNSELING    Long term current use of amiodarone  -     TSH; Future; Expected date: 05/08/2024  -     T3; Future; Expected date: 05/08/2024  -     T4; Future; Expected date: 05/08/2024    Acquired hypothyroidism  -     TSH; Future; Expected date: 05/08/2024  -     T3; Future; Expected date: 05/08/2024  -     T4; Future; Expected date: 05/08/2024    Atherosclerosis of native arteries of extremities with intermittent claudication, bilateral legs    Non-rheumatic mitral regurgitation  -     Echo    Hypercholesterolemia  -     Comprehensive Metabolic Panel; Future; Expected date: 05/08/2024  -     Lipid Panel; Future; Expected date: 05/08/2024    Other orders  -     levothyroxine (SYNTHROID) 75 MCG tablet; Take 1 tablet (75 mcg total) by mouth before breakfast.  Dispense: 90 tablet; Refill: 1    RTC Low level/low impact aerobic exercise 5x's/wk. Heart healthy diet and risk factor modification.    See labs and med orders.    Aerobic exercise 5x's/wk. Heart healthy diet and risk factor modification.    See labs and med orders.

## 2023-11-21 LAB
OHS CV DC REMOTE DEVICE TYPE: NORMAL
OHS CV RV PACING PERCENT: 0.19 %

## 2023-11-30 ENCOUNTER — CLINICAL SUPPORT (OUTPATIENT)
Dept: CARDIOLOGY | Facility: CLINIC | Age: 78
End: 2023-11-30
Attending: INTERNAL MEDICINE
Payer: MEDICARE

## 2023-11-30 VITALS — WEIGHT: 214 LBS | BODY MASS INDEX: 34.39 KG/M2 | HEIGHT: 66 IN

## 2023-11-30 LAB
ASCENDING AORTA: 3.94 CM
AV INDEX (PROSTH): 0.98
AV MEAN GRADIENT: 2 MMHG
AV PEAK GRADIENT: 4 MMHG
AV VALVE AREA BY VELOCITY RATIO: 3.79 CM²
AV VALVE AREA: 3.35 CM²
AV VELOCITY RATIO: 1.11
BSA FOR ECHO PROCEDURE: 2.13 M2
CV ECHO LV RWT: 0.6 CM
DOP CALC AO PEAK VEL: 0.95 M/S
DOP CALC AO VTI: 17.7 CM
DOP CALC LVOT AREA: 3.4 CM2
DOP CALC LVOT DIAMETER: 2.09 CM
DOP CALC LVOT PEAK VEL: 1.05 M/S
DOP CALC LVOT STROKE VOLUME: 59.32 CM3
DOP CALCLVOT PEAK VEL VTI: 17.3 CM
E WAVE DECELERATION TIME: 154.31 MSEC
E/A RATIO: 0.61
E/E' RATIO: 6.75 M/S
ECHO LV POSTERIOR WALL: 1.29 CM (ref 0.6–1.1)
FRACTIONAL SHORTENING: 29 % (ref 28–44)
INTERVENTRICULAR SEPTUM: 1.44 CM (ref 0.6–1.1)
LA MAJOR: 6.65 CM
LA MINOR: 6.52 CM
LEFT ATRIUM SIZE: 4.23 CM
LEFT INTERNAL DIMENSION IN SYSTOLE: 3.09 CM (ref 2.1–4)
LEFT VENTRICLE DIASTOLIC VOLUME INDEX: 41.09 ML/M2
LEFT VENTRICLE DIASTOLIC VOLUME: 84.65 ML
LEFT VENTRICLE MASS INDEX: 110 G/M2
LEFT VENTRICLE SYSTOLIC VOLUME INDEX: 18.3 ML/M2
LEFT VENTRICLE SYSTOLIC VOLUME: 37.7 ML
LEFT VENTRICULAR INTERNAL DIMENSION IN DIASTOLE: 4.33 CM (ref 3.5–6)
LEFT VENTRICULAR MASS: 225.83 G
LV LATERAL E/E' RATIO: 5.4 M/S
LV SEPTAL E/E' RATIO: 9 M/S
LVOT MG: 2.51 MMHG
LVOT MV: 0.76 CM/S
MV PEAK A VEL: 0.88 M/S
MV PEAK E VEL: 0.54 M/S
MV STENOSIS PRESSURE HALF TIME: 44.75 MS
MV VALVE AREA P 1/2 METHOD: 4.92 CM2
PISA TR MAX VEL: 2.16 M/S
RA MAJOR: 5.73 CM
RA PRESSURE ESTIMATED: 3 MMHG
RV TB RVSP: 5 MMHG
SINUS: 3.66 CM
STJ: 3.28 CM
TDI LATERAL: 0.1 M/S
TDI SEPTAL: 0.06 M/S
TDI: 0.08 M/S
TR MAX PG: 19 MMHG
TV REST PULMONARY ARTERY PRESSURE: 22 MMHG
Z-SCORE OF LEFT VENTRICULAR DIMENSION IN END DIASTOLE: -3.62
Z-SCORE OF LEFT VENTRICULAR DIMENSION IN END SYSTOLE: -1.65

## 2023-12-15 RX ORDER — AMIODARONE HYDROCHLORIDE 200 MG/1
TABLET ORAL
Qty: 90 TABLET | Refills: 0 | Status: SHIPPED | OUTPATIENT
Start: 2023-12-15 | End: 2024-03-06

## 2024-01-05 ENCOUNTER — HOSPITAL ENCOUNTER (EMERGENCY)
Facility: HOSPITAL | Age: 79
Discharge: HOME | End: 2024-01-05
Attending: STUDENT IN AN ORGANIZED HEALTH CARE EDUCATION/TRAINING PROGRAM
Payer: MEDICARE

## 2024-01-05 VITALS
SYSTOLIC BLOOD PRESSURE: 131 MMHG | HEIGHT: 66 IN | DIASTOLIC BLOOD PRESSURE: 64 MMHG | WEIGHT: 215 LBS | BODY MASS INDEX: 34.55 KG/M2 | OXYGEN SATURATION: 96 % | RESPIRATION RATE: 20 BRPM | HEART RATE: 83 BPM | TEMPERATURE: 98 F

## 2024-01-05 DIAGNOSIS — R05.9 COUGH: ICD-10-CM

## 2024-01-05 LAB
INFLUENZA A, MOLECULAR: NEGATIVE
INFLUENZA B, MOLECULAR: NEGATIVE
SARS-COV-2 RDRP RESP QL NAA+PROBE: NEGATIVE
SPECIMEN SOURCE: NORMAL

## 2024-01-05 PROCEDURE — U0002 COVID-19 LAB TEST NON-CDC: HCPCS | Performed by: NURSE PRACTITIONER

## 2024-01-05 PROCEDURE — 99283 EMERGENCY DEPT VISIT LOW MDM: CPT | Mod: 25

## 2024-01-05 PROCEDURE — 87502 INFLUENZA DNA AMP PROBE: CPT

## 2024-01-05 NOTE — FIRST PROVIDER EVALUATION
Emergency Department TeleTriage Encounter Note      CHIEF COMPLAINT    Chief Complaint   Patient presents with    Cough    Weight Loss       VITAL SIGNS   Initial Vitals [01/05/24 1330]   BP Pulse Resp Temp SpO2   111/71 74 20 98.3 °F (36.8 °C) 95 %      MAP       --            ALLERGIES    Review of patient's allergies indicates:  No Known Allergies    PROVIDER TRIAGE NOTE  TeleTriage Note: Moiz Valencia, a nontoxic/well appearing, 78 y.o. male, presented to the ED with c/o bad cough and feels like he has a cold for the past 4 days. Denies fevers, chest pain or SOB. Denies swelling in his legs.     All ED beds are full at present; patient notified of this status.  Patient seen and medically screened by Nurse Practitioner via teletriage. Orders initiated at triage to expedite care.  Patient is stable to return to the waiting room and will be placed in an ED bed when available.  Care will be transferred to an alternate provider when patient has been placed in an Exam Room from the lobby for physical exam, additional orders, and disposition.  2:23 PM Leidy Baltazar DNP, FNP-C        ORDERS  Labs Reviewed - No data to display    ED Orders (720h ago, onward)      Start Ordered     Status Ordering Provider    01/05/24 1426 01/05/24 1425  Influenza A & B by Molecular  Once         Ordered LEIDY BALTAZAR    01/05/24 1426 01/05/24 1425  COVID-19 Rapid Screening  STAT         Ordered LEIDY BALTAZAR    01/05/24 1425 01/05/24 1424  X-Ray Chest PA And Lateral  1 time imaging         Ordered LEIDY BALTAZAR              Virtual Visit Note: The provider triage portion of this emergency department evaluation and documentation was performed via TimePoints, a HIPAA-compliant telemedicine application, in concert with a tele-presenter in the room. A face to face patient evaluation with one of my colleagues will occur once the patient is placed in an emergency department room.      DISCLAIMER: This note was prepared  with M*Modal voice recognition transcription software. Garbled syntax, mangled pronouns, and other bizarre constructions may be attributed to that software system.

## 2024-01-06 NOTE — ED PROVIDER NOTES
Encounter Date: 1/5/2024       History     Chief Complaint   Patient presents with    Cough    Weight Loss     HPI    Patient is a 78-year-old male with history of CHF, CAD, hypertension presenting with persistent cough over the past week.  Patient reported developing a upper respiratory infection a few days following Pfeifer time.  Since then he has had this persistent nonproductive cough.  Patient denies any fever, shortness of breath, chest pain, or abdominal pain.  Patient has been taking DayQuil which he states is helping.  With a past 2 days he has had decreased energy and came to get evaluated for this persistent cough.    Review of patient's allergies indicates:  No Known Allergies  Past Medical History:   Diagnosis Date    Acute coronary syndrome     CAD (coronary artery disease)     Cardiomyopathy     Carotid artery occlusion     CHF (congestive heart failure)     Diabetes mellitus, type 2     H/O cataract removal with insertion of prosthetic lens     bilat    Heart murmur     Hypercholesteremia     Hypertension     Old MI (myocardial infarction)     Pneumonia     Pneumonia 6/30/2016    Polio     PUD (peptic ulcer disease)     Sleep apnea     Valvular regurgitation      Past Surgical History:   Procedure Laterality Date    CARDIAC PACEMAKER PLACEMENT      CARDIAC SURGERY      pt states that he had 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    CHOLECYSTECTOMY      COLONOSCOPY      CORONARY ANGIOPLASTY      CORONARY ARTERY BYPASS GRAFT      HERNIA REPAIR      HIP SURGERY Right 07/05/2018    HIP SURGERY Left 11/26/2018    INCISIONAL HERNIA REPAIR  06/05/2017    LAPAROSCOPIC CHOLECYSTECTOMY N/A 9/4/2019    Procedure: CHOLECYSTECTOMY-LAPAROSCOPIC;  Surgeon: Aditya Zeng MD;  Location: [facility];  Service: General;  Laterality: N/A;    left ventricular  aneurysm repair       TONSILLECTOMY       Family History   Problem Relation Age of Onset    Diabetes Mother     Heart disease Mother     No Known  Problems Father      Social History     Tobacco Use    Smoking status: Former    Smokeless tobacco: Former     Types: Chew     Quit date: 7/30/2018   Substance Use Topics    Alcohol use: No    Drug use: No     Review of Systems   Constitutional:  Negative for activity change and appetite change.   HENT:  Negative for congestion, sinus pain and sore throat.    Respiratory:  Positive for cough. Negative for shortness of breath.    Cardiovascular:  Negative for chest pain.   Gastrointestinal:  Negative for abdominal pain.   Skin:  Negative for wound.       Physical Exam     Initial Vitals [01/05/24 1330]   BP Pulse Resp Temp SpO2   111/71 74 20 98.3 °F (36.8 °C) 95 %      MAP       --         Physical Exam    Constitutional: He appears well-developed and well-nourished.   HENT:   Head: Normocephalic.   Eyes: Conjunctivae are normal.   Neck:   Normal range of motion.  Cardiovascular:  Normal rate.           No murmur heard.  Pulmonary/Chest: Breath sounds normal. No respiratory distress. He has no rales.   Slight expiratory wheezing to upper lung fields   Abdominal: Abdomen is soft. He exhibits no distension. There is no abdominal tenderness.   Musculoskeletal:         General: No edema. Normal range of motion.      Cervical back: Normal range of motion.     Neurological: He is alert and oriented to person, place, and time.   Skin: Skin is warm and dry. Capillary refill takes less than 2 seconds. No rash noted.         ED Course   Procedures  Labs Reviewed   INFLUENZA A & B BY MOLECULAR   SARS-COV-2 RNA AMPLIFICATION, QUAL   INFLUENZA A AND B ANTIGEN    Narrative:     Specimen Source->Nasopharyngeal Swab          Imaging Results              X-Ray Chest PA And Lateral (Final result)  Result time 01/05/24 15:40:11      Final result by Porfirio Toribio MD (01/05/24 15:40:11)                   Narrative:    Reason: Cough; Weight Loss    FINDINGS:  PA and lateral chest compared with 8/2/2022 show normal cardiomediastinal  silhouette with postsurgical changes of CABG. Left transvenous ICD unchanged.    Lungs are clear. Pulmonary vasculature is normal. Bridging syndesmophytes occur throughout the thoracic spine. Upper abdominal surgical clips suggest cholecystectomy.    IMPRESSION:  No acute cardiopulmonary abnormality.    Electronically signed by:  Porfirio Toribio MD  01/05/2024 03:40 PM CST Workstation: [id number]                                     Medications - No data to display  Medical Decision Making  78-year-old male with history of CHF, CAD, hypertension presenting with persistent cough over the past week with some decreased energy over the past 2 days.  No fever, chest pain, orthopnea.  Vital signs here are stable and within normal limits.  Exam as above without any acute abnormality other than slight expiratory wheezing.  Chest x-ray interpreted by me with presence of pacemaker but no large consolidation or concern for infectious process.  Flu and COVID are negative.  On exam patient appears stable.  Low suspicion for emergent etiology including ACS given lack of chest pain.  No evidence of volume overload.  PE less likely given lack of chest pain, tachycardic, hypoxia, or unilateral leg swelling..  Advised symptomatic therapy with continue DayQuil, Motrin and Tylenol as needed.  Return precautions were discussed.  Patient understands and agrees with this plan.    Jed Burch MD  Emergency Medicine                                        Clinical Impression:  Final diagnoses:  [R05.9] Jed Antoine MD  01/05/24 1933

## 2024-02-07 ENCOUNTER — CLINICAL SUPPORT (OUTPATIENT)
Dept: CARDIOLOGY | Facility: HOSPITAL | Age: 79
End: 2024-02-07
Payer: MEDICARE

## 2024-02-07 ENCOUNTER — HOSPITAL ENCOUNTER (OUTPATIENT)
Dept: CARDIOLOGY | Facility: HOSPITAL | Age: 79
Discharge: HOME | End: 2024-02-07
Attending: INTERNAL MEDICINE
Payer: MEDICARE

## 2024-02-07 DIAGNOSIS — I50.1 LEFT VENTRICULAR FAILURE, UNSPECIFIED: ICD-10-CM

## 2024-02-07 PROCEDURE — 93295 DEV INTERROG REMOTE 1/2/MLT: CPT | Mod: ,,, | Performed by: INTERNAL MEDICINE

## 2024-03-05 DIAGNOSIS — I10 PRIMARY HYPERTENSION: ICD-10-CM

## 2024-03-05 DIAGNOSIS — I25.10 CORONARY ARTERY DISEASE INVOLVING NATIVE CORONARY ARTERY OF NATIVE HEART WITHOUT ANGINA PECTORIS: Primary | ICD-10-CM

## 2024-03-05 DIAGNOSIS — I50.1 LEFT HEART FAILURE: ICD-10-CM

## 2024-03-05 LAB
OHS CV DC REMOTE DEVICE TYPE: NORMAL
OHS CV ICD SHOCK: NO
OHS CV RV PACING PERCENT: 0.45 %

## 2024-03-06 RX ORDER — AMIODARONE HYDROCHLORIDE 200 MG/1
TABLET ORAL
Qty: 90 TABLET | Refills: 0 | Status: SHIPPED | OUTPATIENT
Start: 2024-03-06 | End: 2024-06-03

## 2024-03-24 DIAGNOSIS — I50.1 LEFT HEART FAILURE: ICD-10-CM

## 2024-03-25 RX ORDER — SACUBITRIL AND VALSARTAN 24; 26 MG/1; MG/1
TABLET, FILM COATED ORAL
Qty: 180 TABLET | Refills: 0 | Status: SHIPPED | OUTPATIENT
Start: 2024-03-25

## 2024-05-09 ENCOUNTER — CLINICAL SUPPORT (OUTPATIENT)
Dept: CARDIOLOGY | Facility: HOSPITAL | Age: 79
End: 2024-05-09

## 2024-05-09 ENCOUNTER — HOSPITAL ENCOUNTER (OUTPATIENT)
Dept: CARDIOLOGY | Facility: HOSPITAL | Age: 79
Discharge: HOME | End: 2024-05-09
Attending: INTERNAL MEDICINE

## 2024-05-09 DIAGNOSIS — Z95.818 PRESENCE OF OTHER CARDIAC IMPLANTS AND GRAFTS: ICD-10-CM

## 2024-05-09 PROCEDURE — 93295 DEV INTERROG REMOTE 1/2/MLT: CPT | Mod: ,,, | Performed by: INTERNAL MEDICINE

## 2024-05-09 PROCEDURE — 93296 REM INTERROG EVL PM/IDS: CPT | Mod: PO | Performed by: INTERNAL MEDICINE

## 2024-05-29 ENCOUNTER — OFFICE VISIT (OUTPATIENT)
Dept: CARDIOLOGY | Facility: CLINIC | Age: 79
End: 2024-05-29
Payer: MEDICARE

## 2024-05-29 VITALS
HEART RATE: 58 BPM | WEIGHT: 211.63 LBS | DIASTOLIC BLOOD PRESSURE: 57 MMHG | SYSTOLIC BLOOD PRESSURE: 120 MMHG | BODY MASS INDEX: 34.01 KG/M2 | HEIGHT: 66 IN

## 2024-05-29 DIAGNOSIS — I34.0 NON-RHEUMATIC MITRAL REGURGITATION: Chronic | ICD-10-CM

## 2024-05-29 DIAGNOSIS — I25.10 CORONARY ARTERY DISEASE INVOLVING NATIVE CORONARY ARTERY OF NATIVE HEART WITHOUT ANGINA PECTORIS: Chronic | ICD-10-CM

## 2024-05-29 DIAGNOSIS — E78.00 HYPERCHOLESTEROLEMIA: Chronic | ICD-10-CM

## 2024-05-29 DIAGNOSIS — E66.9 OBESITY, CLASS I, BMI 30-34.9: Chronic | ICD-10-CM

## 2024-05-29 DIAGNOSIS — I50.1 LEFT HEART FAILURE: Primary | Chronic | ICD-10-CM

## 2024-05-29 DIAGNOSIS — I70.213 ATHEROSCLEROSIS OF NATIVE ARTERIES OF EXTREMITIES WITH INTERMITTENT CLAUDICATION, BILATERAL LEGS: Chronic | ICD-10-CM

## 2024-05-29 DIAGNOSIS — I10 PRIMARY HYPERTENSION: Chronic | ICD-10-CM

## 2024-05-29 DIAGNOSIS — Z79.899 LONG TERM CURRENT USE OF AMIODARONE: Chronic | ICD-10-CM

## 2024-05-29 PROCEDURE — 99214 OFFICE O/P EST MOD 30 MIN: CPT | Mod: S$GLB,,, | Performed by: INTERNAL MEDICINE

## 2024-05-29 NOTE — PROGRESS NOTES
Subjective:    Patient ID:  Moiz Valencia is a 78 y.o. male who presents for Congestive Heart Failure        HPI   DISCUSSED TESTS,ECHO TECHNICALLY VERY DIFFICULT UNABLE TO ASSESS EF, PROBABLE MILD MR, DILATED LEFT ATRIUM, MISSED LABS, DOING OK, TO HAVE EGD AND COLONOSCOPY, OLOA, SEE ROS    Past Medical History:   Diagnosis Date    Acute coronary syndrome     CAD (coronary artery disease)     Cardiomyopathy     Carotid artery occlusion     CHF (congestive heart failure)     Diabetes mellitus, type 2     H/O cataract removal with insertion of prosthetic lens     bilat    Heart murmur     Hypercholesteremia     Hypertension     Old MI (myocardial infarction)     Pneumonia     Pneumonia 6/30/2016    [place]io     PUD (peptic ulcer disease)     Sleep apnea     Valvular regurgitation      Past Surgical History:   Procedure Laterality Date    CARDIAC PACEMAKER PLACEMENT      CARDIAC SURGERY      pt states that he had 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    CHOLECYSTECTOMY      COLONOSCOPY      CORONARY ANGIOPLASTY      CORONARY ARTERY BYPASS GRAFT      HERNIA REPAIR      HIP SURGERY Right 07/05/2018    HIP SURGERY Left 11/26/2018    INCISIONAL HERNIA REPAIR  06/05/2017    LAPAROSCOPIC CHOLECYSTECTOMY N/A 9/4/2019    Procedure: CHOLECYSTECTOMY-LAPAROSCOPIC;  Surgeon: Aditya Zeng MD;  Location: [facility];  Service: General;  Laterality: N/A;    left ventricular  aneurysm repair       TONSILLECTOMY       Family History   Problem Relation Name Age of Onset    Diabetes Mother      Heart disease Mother      No Known Problems Father       Social History     Socioeconomic History    Marital status:    Tobacco Use    Smoking status: Former    Smokeless tobacco: Former     Types: Chew     Quit date: 7/30/2018   Substance and Sexual Activity    Alcohol use: No    Drug use: No       Review of patient's allergies indicates:  No Known Allergies    Current Outpatient Medications:     albuterol (PROVENTIL/VENTOLIN  HFA) 90 mcg/actuation inhaler, INHALE 1-2 PUFFS INTO THE LUNGS EVERY 6 HOURS AS NEEDED FOR WHEEZING OR SHORTNESS OF BREATH. RESCUE, Disp: 8.5 g, Rfl: 0    allopurinol (ZYLOPRIM) 300 MG tablet, Take 300 mg by mouth once daily., Disp: , Rfl:     amiodarone (PACERONE) 200 MG Tab, TAKE 1 TABLET(200 MG) BY MOUTH EVERY DAY, Disp: 90 tablet, Rfl: 0    aspirin (ECOTRIN) 81 MG EC tablet, Take 1 tablet (81 mg total) by mouth once daily., Disp: , Rfl: 0    atorvastatin (LIPITOR) 80 MG tablet, TAKE 1 TABLET(80 MG) BY MOUTH EVERY EVENING, Disp: 90 tablet, Rfl: 2    B-complex with vitamin C (Z-BEC OR EQUIV) tablet, Take 1 tablet by mouth once daily., Disp: , Rfl:     blood sugar diagnostic Strp, To check BG 2 times daily, to use with insurance preferred meter, Disp: 100 each, Rfl: 3    cetirizine (ZYRTEC) 10 MG tablet, Take 1 tablet (10 mg total) by mouth once daily., Disp: 10 tablet, Rfl: 0    cholecalciferol, vitamin D3, (VITAMIN D3) 25 mcg (1,000 unit) capsule, Take 1,000 Units by mouth once daily., Disp: , Rfl:     cyanocobalamin (VITAMIN B-12) 1000 MCG tablet, Take 1,000 mcg by mouth once daily., Disp: , Rfl:     donepezil (ARICEPT) 10 MG tablet, Take 10 mg by mouth once daily., Disp: , Rfl:     DULoxetine (CYMBALTA) 20 MG capsule, Take 60 mg by mouth once daily. , Disp: , Rfl:     DULoxetine (CYMBALTA) 60 MG capsule, Take 60 mg by mouth once daily., Disp: , Rfl:     ENTRESTO 24-26 mg per tablet, TAKE 1 TABLET BY MOUTH TWICE DAILY, Disp: 180 tablet, Rfl: 0    FLUZONE HIGH-DOSE 2019-20, PF, 180 mcg/0.5 mL Syrg, ADM 0.5ML IM UTD, Disp: , Rfl: 0    gabapentin (NEURONTIN) 100 MG capsule, Take 1 capsule (100 mg total) by mouth 2 (two) times daily. (Patient taking differently: Take 300 mg by mouth 2 (two) times daily.), Disp: , Rfl:     HYDROcodone-acetaminophen (NORCO) 7.5-325 mg per tablet, TAKE 1 TABLET BY MOUTH TWICE A DAY AS NEEDED TAKE ONLY AS NEEDED FOR SEVERE PAIN  DO NOT DRINK ALCOHOL OR DRIVE WHILE TAKING THIS MED TAKE  ONLY AS NEEDED FOR SEVERE PAIN  DO NOT DRINK ALCOHOL OR DRIVE WHILE TAKING THIS MED, Disp: , Rfl:     insulin detemir U-100 (LEVEMIR) 100 unit/mL injection, Inject 5 Units into the skin every evening. (Patient taking differently: Inject 8 Units into the skin 2 (two) times a day.), Disp: , Rfl:     lancets Misc, To check BG 2 times daily, to use with insurance preferred meter, Disp: 100 each, Rfl: 3    levothyroxine (SYNTHROID) 75 MCG tablet, Take 1 tablet (75 mcg total) by mouth before breakfast., Disp: 90 tablet, Rfl: 1    magnesium oxide (MAG-OX) 400 mg tablet, Take 400 mg by mouth once daily., Disp: , Rfl: 0    memantine (NAMENDA) 10 MG Tab, Take 10 mg by mouth 2 (two) times daily., Disp: , Rfl:     metformin (GLUCOPHAGE) 1000 MG tablet, Take 1,000 mg by mouth 2 (two) times daily with meals., Disp: , Rfl:     mirtazapine (REMERON) 30 MG tablet, Take 15 mg by mouth every evening., Disp: , Rfl:     naproxen (NAPROSYN) 500 MG tablet, Take 500 mg by mouth 2 (two) times daily with meals., Disp: , Rfl:     oxybutynin (DITROPAN-XL) 5 MG TR24, TAKE 1 TABLET(5 MG) BY MOUTH EVERY DAY, Disp: 90 tablet, Rfl: 1    QUEtiapine (SEROQUEL) 100 MG Tab, 50 mg., Disp: , Rfl:     QUEtiapine (SEROQUEL) 200 MG Tab, Take 100-200 mg by mouth nightly as needed., Disp: , Rfl:     UNABLE TO FIND, Take 1 capsule by mouth once daily. medication name: tumeric, Disp: , Rfl:     UNABLE TO FIND, Take 1 capsule by mouth once daily. medication name: vitamin k, Disp: , Rfl:     zinc gluconate 50 mg tablet, Take 50 mg by mouth once daily., Disp: , Rfl:     blood-glucose meter kit, To check BG 2 times daily, to use with insurance preferred meter, Disp: 1 each, Rfl: 0    famotidine (PEPCID) 20 MG tablet, Take 1 tablet (20 mg total) by mouth 2 (two) times daily. for 10 days, Disp: 20 tablet, Rfl: 0    Review of Systems   Constitutional: Negative for chills, diaphoresis, fever, night sweats and weight gain.   HENT:  Negative for congestion and  "nosebleeds.    Eyes:  Negative for blurred vision and visual disturbance.   Cardiovascular:  Negative for chest pain, claudication, cyanosis, dyspnea on exertion (MILD), irregular heartbeat, leg swelling, near-syncope, orthopnea, palpitations, paroxysmal nocturnal dyspnea and syncope.   Respiratory:  Negative for cough, hemoptysis, shortness of breath and wheezing.    Endocrine: Negative for polyphagia and polyuria.        BG OK   Hematologic/Lymphatic: Negative for adenopathy. Does not bruise/bleed easily.   Skin:  Negative for color change and itching.   Musculoskeletal:  Positive for back pain (CHRONIC, LOWER). Negative for falls. Muscle weakness: LEGS.  Gastrointestinal:  Negative for abdominal pain, change in bowel habit, dysphagia, jaundice, melena and nausea.   Genitourinary:  Negative for dysuria and flank pain.   Neurological:  Negative for brief paralysis, focal weakness, headaches, light-headedness, loss of balance (OCC) and weakness.   Psychiatric/Behavioral:  Negative for altered mental status, depression and memory loss (SOME).    Allergic/Immunologic: Negative for hives and persistent infections.        Objective:      Vitals:    05/29/24 1313   BP: (!) 120/57   Pulse: (!) 58   Weight: 96 kg (211 lb 10.3 oz)   Height: 5' 6" (1.676 m)   PainSc: 0-No pain     Body mass index is 34.16 kg/m².    Physical Exam  Constitutional:       Appearance: He is well-developed. He is obese.   HENT:      Head: Normocephalic and atraumatic.   Eyes:      Extraocular Movements: Extraocular movements intact.      Conjunctiva/sclera: Conjunctivae normal.   Neck:      Vascular: Normal carotid pulses. No carotid bruit or JVD.   Cardiovascular:      Rate and Rhythm: Normal rate and regular rhythm. No extrasystoles are present.     Pulses:           Carotid pulses are 2+ on the right side and 2+ on the left side.       Radial pulses are 2+ on the right side and 2+ on the left side.        Posterior tibial pulses are 2+ on the " right side and 2+ on the left side.      Heart sounds: Murmur heard.      Systolic murmur is present with a grade of 1/6 at the lower left sternal border and apex.      No friction rub. No gallop.   Pulmonary:      Effort: Pulmonary effort is normal.      Breath sounds: Normal breath sounds. No rales.   Chest:          Comments: STERNOTOMY SCAR, AICD  Abdominal:      Palpations: Abdomen is soft. There is no hepatomegaly.   Musculoskeletal:         General: Normal range of motion.      Cervical back: Neck supple.      Right lower leg: No edema.      Left lower leg: No edema.      Comments: SLOW GAIT   Skin:     General: Skin is warm and dry.      Capillary Refill: Capillary refill takes less than 2 seconds.   Neurological:      General: No focal deficit present.      Mental Status: He is alert and oriented to person, place, and time.      Cranial Nerves: Cranial nerves 2-12 are intact. Cranial nerve deficit: Samish.   Psychiatric:         Mood and Affect: Mood normal.         Speech: Speech normal.         Behavior: Behavior normal.               ..    Chemistry        Component Value Date/Time     11/07/2023 0844    K 4.3 11/07/2023 0844     11/07/2023 0844    CO2 28 11/07/2023 0844    BUN 21 11/07/2023 0844    CREATININE 1.3 11/07/2023 0844     11/07/2023 0844        Component Value Date/Time    CALCIUM 9.5 11/07/2023 0844    ALKPHOS 69 11/07/2023 0844    AST 14 11/07/2023 0844    AST 15 (L) 11/10/2015 0819    ALT 10 11/07/2023 0844    BILITOT 0.3 11/07/2023 0844    ESTGFRAFRICA >60 11/19/2021 1101    EGFRNONAA >60 11/19/2021 1101            ..  Lab Results   Component Value Date    CHOL 139 08/11/2022    CHOL 113 (L) 03/15/2021    CHOL 124 11/18/2020     Lab Results   Component Value Date    HDL 54 08/11/2022    HDL 50 03/15/2021    HDL 60 11/18/2020     Lab Results   Component Value Date    LDLCALC 73.8 08/11/2022    LDLCALC 52.8 (L) 03/15/2021    LDLCALC 56.8 (L) 11/18/2020     Lab Results    Component Value Date    TRIG 56 08/11/2022    TRIG 51 03/15/2021    TRIG 36 11/18/2020     Lab Results   Component Value Date    CHOLHDL 38.8 08/11/2022    CHOLHDL 44.2 03/15/2021    CHOLHDL 48.4 11/18/2020     ..  Lab Results   Component Value Date    WBC 8.72 11/09/2022    HGB 10.5 (L) 11/09/2022    HCT 33.3 (L) 11/09/2022    MCV 99 (H) 11/09/2022     11/09/2022       Test(s) Reviewed  I have reviewed the following in detail:  [] Stress test   [] Angiography   [x] Echocardiogram   [] Labs   [] Other:       Assessment:         ICD-10-CM ICD-9-CM   1. Left heart failure  I50.1 428.1   2. Coronary artery disease involving native coronary artery of native heart without angina pectoris  I25.10 414.01   3. Atherosclerosis of native arteries of extremities with intermittent claudication, bilateral legs  I70.213 440.21   4. Long term current use of amiodarone  Z79.899 V58.69   5. Non-rheumatic mitral regurgitation  I34.0 424.0   6. Hypercholesterolemia  E78.00 272.0   7. Primary hypertension  I10 401.9   8. Obesity, Class I, BMI 30-34.9  E66.9 278.00     Problem List Items Addressed This Visit          Cardiac/Vascular    Coronary artery disease involving native coronary artery of native heart without angina pectoris    Relevant Orders    Nuclear Stress - Cardiology Interpreted    Comprehensive Metabolic Panel    Lipid Panel    Hypertension    Left heart failure - Primary    Relevant Orders    Nuclear Stress - Cardiology Interpreted    BNP    Long term current use of amiodarone    Relevant Orders    Comprehensive Metabolic Panel    TSH    T3    T4    Hypercholesterolemia    Relevant Orders    Lipid Panel    Non-rheumatic mitral regurgitation    Atherosclerosis of native arteries of extremities with intermittent claudication, bilateral legs       Endocrine    Obesity, Class I, BMI 30-34.9        Plan:          CHECK LABS,  NUCLEAR STRESS  TEST TO ASSESS FOR ISCHEMIA VERY DIFFICULT ECHO TO INTERPRET, DAILY WEIGHT 2  LB PER DAY 5 LB PER WEEK RULE EXPLAINED, CLASS 2 HEART FAILURE NO CLINICAL ARRHYTHMIA NO AICD SHOCK WALKING EXERCISE AS MUCH AS POSSIBLE VERY SLOW GAIT DUE TO BACK AND ARTHRITIS, DISCUSSED PLAN WITH THE PATIENT AND HIS WIFE, RETURN TO CLINIC IN FEW  MONTHS  Left heart failure  -     Nuclear Stress - Cardiology Interpreted; Future  -     BNP; Future; Expected date: 11/29/2024    Coronary artery disease involving native coronary artery of native heart without angina pectoris  -     Nuclear Stress - Cardiology Interpreted; Future  -     Comprehensive Metabolic Panel; Future; Expected date: 05/29/2024  -     Lipid Panel; Future; Expected date: 05/29/2024    Atherosclerosis of native arteries of extremities with intermittent claudication, bilateral legs    Long term current use of amiodarone  -     Comprehensive Metabolic Panel; Future; Expected date: 05/29/2024  -     TSH; Future; Expected date: 05/29/2024  -     T3; Future; Expected date: 05/29/2024  -     T4; Future; Expected date: 05/29/2024    Non-rheumatic mitral regurgitation    Hypercholesterolemia  -     Lipid Panel; Future; Expected date: 05/29/2024    Primary hypertension    Obesity, Class I, BMI 30-34.9    RTC Low level/low impact aerobic exercise 5x's/wk. Heart healthy diet and risk factor modification.    See labs and med orders.    Aerobic exercise 5x's/wk. Heart healthy diet and risk factor modification.    See labs and med orders.

## 2024-06-02 DIAGNOSIS — I25.10 CORONARY ARTERY DISEASE INVOLVING NATIVE CORONARY ARTERY OF NATIVE HEART WITHOUT ANGINA PECTORIS: ICD-10-CM

## 2024-06-02 DIAGNOSIS — I50.1 LEFT HEART FAILURE: ICD-10-CM

## 2024-06-02 DIAGNOSIS — I10 PRIMARY HYPERTENSION: ICD-10-CM

## 2024-06-03 RX ORDER — AMIODARONE HYDROCHLORIDE 200 MG/1
TABLET ORAL
Qty: 90 TABLET | Refills: 0 | Status: SHIPPED | OUTPATIENT
Start: 2024-06-03

## 2024-06-06 ENCOUNTER — LAB VISIT (OUTPATIENT)
Dept: PRIMARY CARE CLINIC | Facility: CLINIC | Age: 79
End: 2024-06-06
Payer: MEDICARE

## 2024-06-06 DIAGNOSIS — I25.10 CORONARY ARTERY DISEASE INVOLVING NATIVE CORONARY ARTERY OF NATIVE HEART WITHOUT ANGINA PECTORIS: Chronic | ICD-10-CM

## 2024-06-06 DIAGNOSIS — Z79.899 LONG TERM CURRENT USE OF AMIODARONE: Chronic | ICD-10-CM

## 2024-06-06 DIAGNOSIS — I50.1 LEFT HEART FAILURE: Chronic | ICD-10-CM

## 2024-06-06 DIAGNOSIS — E78.00 HYPERCHOLESTEROLEMIA: Chronic | ICD-10-CM

## 2024-06-06 LAB
ALBUMIN SERPL BCP-MCNC: 3.6 G/DL (ref 3.5–5.2)
ALP SERPL-CCNC: 90 U/L (ref 55–135)
ALT SERPL W/O P-5'-P-CCNC: 13 U/L (ref 10–44)
ANION GAP SERPL CALC-SCNC: 9 MMOL/L (ref 8–16)
AST SERPL-CCNC: 13 U/L (ref 10–40)
BILIRUB SERPL-MCNC: 0.4 MG/DL (ref 0.1–1)
BUN SERPL-MCNC: 21 MG/DL (ref 8–23)
CALCIUM SERPL-MCNC: 9.7 MG/DL (ref 8.7–10.5)
CHLORIDE SERPL-SCNC: 106 MMOL/L (ref 95–110)
CHOLEST SERPL-MCNC: 173 MG/DL (ref 120–199)
CHOLEST/HDLC SERPL: 3.5 {RATIO} (ref 2–5)
CO2 SERPL-SCNC: 27 MMOL/L (ref 23–29)
CREAT SERPL-MCNC: 1.1 MG/DL (ref 0.5–1.4)
EST. GFR  (NO RACE VARIABLE): >60 ML/MIN/1.73 M^2
GLUCOSE SERPL-MCNC: 127 MG/DL (ref 70–110)
HDLC SERPL-MCNC: 49 MG/DL (ref 40–75)
HDLC SERPL: 28.3 % (ref 20–50)
LDLC SERPL CALC-MCNC: 90.2 MG/DL (ref 63–159)
NONHDLC SERPL-MCNC: 124 MG/DL
POTASSIUM SERPL-SCNC: 4.7 MMOL/L (ref 3.5–5.1)
PROT SERPL-MCNC: 6.6 G/DL (ref 6–8.4)
SODIUM SERPL-SCNC: 142 MMOL/L (ref 136–145)
T3 SERPL-MCNC: <40 NG/DL (ref 60–180)
T4 SERPL-MCNC: 5.6 UG/DL (ref 4.5–11.5)
TRIGL SERPL-MCNC: 169 MG/DL (ref 30–150)
TSH SERPL DL<=0.005 MIU/L-ACNC: 3.88 UIU/ML (ref 0.4–4)

## 2024-06-06 PROCEDURE — 84480 ASSAY TRIIODOTHYRONINE (T3): CPT | Performed by: INTERNAL MEDICINE

## 2024-06-06 PROCEDURE — 84443 ASSAY THYROID STIM HORMONE: CPT | Performed by: INTERNAL MEDICINE

## 2024-06-06 PROCEDURE — 80061 LIPID PANEL: CPT | Performed by: INTERNAL MEDICINE

## 2024-06-06 PROCEDURE — 83880 ASSAY OF NATRIURETIC PEPTIDE: CPT | Performed by: INTERNAL MEDICINE

## 2024-06-06 PROCEDURE — 80053 COMPREHEN METABOLIC PANEL: CPT | Performed by: INTERNAL MEDICINE

## 2024-06-06 PROCEDURE — 84436 ASSAY OF TOTAL THYROXINE: CPT | Performed by: INTERNAL MEDICINE

## 2024-06-06 PROCEDURE — 36415 COLL VENOUS BLD VENIPUNCTURE: CPT | Mod: S$GLB,,, | Performed by: INTERNAL MEDICINE

## 2024-06-07 LAB — BNP SERPL-MCNC: 92 PG/ML (ref 0–99)

## 2024-06-09 LAB
OHS CV DC REMOTE DEVICE TYPE: NORMAL
OHS CV RV PACING PERCENT: 0.19 %

## 2024-06-24 DIAGNOSIS — I50.1 LEFT HEART FAILURE: ICD-10-CM

## 2024-06-24 RX ORDER — SACUBITRIL AND VALSARTAN 24; 26 MG/1; MG/1
TABLET, FILM COATED ORAL
Qty: 180 TABLET | Refills: 1 | Status: SHIPPED | OUTPATIENT
Start: 2024-06-24

## 2024-07-01 ENCOUNTER — HOSPITAL ENCOUNTER (OUTPATIENT)
Dept: CARDIOLOGY | Facility: HOSPITAL | Age: 79
Discharge: HOME | End: 2024-07-01
Attending: INTERNAL MEDICINE
Payer: MEDICARE

## 2024-07-01 ENCOUNTER — HOSPITAL ENCOUNTER (OUTPATIENT)
Dept: RADIOLOGY | Facility: HOSPITAL | Age: 79
Discharge: HOME | End: 2024-07-01
Attending: INTERNAL MEDICINE
Payer: MEDICARE

## 2024-07-01 VITALS — HEIGHT: 66 IN | WEIGHT: 211 LBS | BODY MASS INDEX: 33.91 KG/M2

## 2024-07-01 DIAGNOSIS — I50.1 LEFT HEART FAILURE: Chronic | ICD-10-CM

## 2024-07-01 DIAGNOSIS — I25.10 CORONARY ARTERY DISEASE INVOLVING NATIVE CORONARY ARTERY OF NATIVE HEART WITHOUT ANGINA PECTORIS: Chronic | ICD-10-CM

## 2024-07-01 PROCEDURE — 78452 HT MUSCLE IMAGE SPECT MULT: CPT | Mod: 26,,, | Performed by: INTERNAL MEDICINE

## 2024-07-01 PROCEDURE — 78452 HT MUSCLE IMAGE SPECT MULT: CPT | Mod: PO

## 2024-07-01 PROCEDURE — 93017 CV STRESS TEST TRACING ONLY: CPT | Mod: PO

## 2024-07-01 PROCEDURE — 93018 CV STRESS TEST I&R ONLY: CPT | Mod: S$PBB,,, | Performed by: INTERNAL MEDICINE

## 2024-07-01 PROCEDURE — 93016 CV STRESS TEST SUPVJ ONLY: CPT | Mod: S$PBB,,, | Performed by: INTERNAL MEDICINE

## 2024-07-01 PROCEDURE — 63600175 PHARM REV CODE 636 W HCPCS: Mod: PO | Performed by: INTERNAL MEDICINE

## 2024-07-01 PROCEDURE — 93017 CV STRESS TEST TRACING ONLY: CPT | Mod: PBBFAC,PO

## 2024-07-01 PROCEDURE — A9502 TC99M TETROFOSMIN: HCPCS | Mod: PO | Performed by: INTERNAL MEDICINE

## 2024-07-01 RX ORDER — REGADENOSON 0.08 MG/ML
0.4 INJECTION, SOLUTION INTRAVENOUS
Status: COMPLETED | OUTPATIENT
Start: 2024-07-01 | End: 2024-07-01

## 2024-07-01 RX ADMIN — TETROFOSMIN 10.6 MILLICURIE: 1.38 INJECTION, POWDER, LYOPHILIZED, FOR SOLUTION INTRAVENOUS at 02:07

## 2024-07-01 RX ADMIN — REGADENOSON 0.4 MG: 0.08 INJECTION, SOLUTION INTRAVENOUS at 02:07

## 2024-07-01 RX ADMIN — TETROFOSMIN 33 MILLICURIE: 1.38 INJECTION, POWDER, LYOPHILIZED, FOR SOLUTION INTRAVENOUS at 02:07

## 2024-07-02 LAB
CV PHARM DOSE: 0.4 MG
CV STRESS BASE HR: 59 BPM
DIASTOLIC BLOOD PRESSURE: 71 MMHG
NUC REST EJECTION FRACTION: 63
OHS CV CPX 1 MINUTE RECOVERY HEART RATE: 72 BPM
OHS CV CPX 85 PERCENT MAX PREDICTED HEART RATE MALE: 120
OHS CV CPX MAX PREDICTED HEART RATE: 141
OHS CV CPX PATIENT IS FEMALE: 0
OHS CV CPX PATIENT IS MALE: 1
OHS CV CPX PEAK DIASTOLIC BLOOD PRESSURE: 71 MMHG
OHS CV CPX PEAK HEAR RATE: 74 BPM
OHS CV CPX PEAK RATE PRESSURE PRODUCT: NORMAL
OHS CV CPX PEAK SYSTOLIC BLOOD PRESSURE: 140 MMHG
OHS CV CPX PERCENT MAX PREDICTED HEART RATE ACHIEVED: 52
OHS CV CPX RATE PRESSURE PRODUCT PRESENTING: 8260
OHS CV PHARM TIME: 1406 MIN
SYSTOLIC BLOOD PRESSURE: 140 MMHG

## 2024-08-08 ENCOUNTER — HOSPITAL ENCOUNTER (OUTPATIENT)
Dept: CARDIOLOGY | Facility: HOSPITAL | Age: 79
Discharge: HOME | End: 2024-08-08
Attending: INTERNAL MEDICINE
Payer: MEDICARE

## 2024-08-08 ENCOUNTER — CLINICAL SUPPORT (OUTPATIENT)
Dept: CARDIOLOGY | Facility: HOSPITAL | Age: 79
End: 2024-08-08
Payer: MEDICARE

## 2024-08-08 DIAGNOSIS — I50.1 LEFT VENTRICULAR FAILURE, UNSPECIFIED: ICD-10-CM

## 2024-08-08 PROCEDURE — 93296 REM INTERROG EVL PM/IDS: CPT | Mod: PO | Performed by: INTERNAL MEDICINE

## 2024-08-08 PROCEDURE — 93295 DEV INTERROG REMOTE 1/2/MLT: CPT | Mod: ,,, | Performed by: INTERNAL MEDICINE

## 2024-08-30 LAB
OHS CV DC REMOTE DEVICE TYPE: NORMAL
OHS CV RV PACING PERCENT: 0.26 %

## 2024-09-07 DIAGNOSIS — I25.10 CORONARY ARTERY DISEASE INVOLVING NATIVE CORONARY ARTERY OF NATIVE HEART WITHOUT ANGINA PECTORIS: ICD-10-CM

## 2024-09-07 DIAGNOSIS — I10 PRIMARY HYPERTENSION: ICD-10-CM

## 2024-09-07 DIAGNOSIS — I50.1 LEFT HEART FAILURE: ICD-10-CM

## 2024-09-09 RX ORDER — AMIODARONE HYDROCHLORIDE 200 MG/1
TABLET ORAL
Qty: 90 TABLET | Refills: 0 | Status: SHIPPED | OUTPATIENT
Start: 2024-09-09

## 2024-11-01 DIAGNOSIS — M48.00 SPINAL STENOSIS, UNSPECIFIED SPINAL REGION: Primary | ICD-10-CM

## 2024-11-02 RX ORDER — LEVOTHYROXINE SODIUM 75 UG/1
75 TABLET ORAL
Qty: 90 TABLET | Refills: 0 | Status: SHIPPED | OUTPATIENT
Start: 2024-11-02

## 2024-11-07 ENCOUNTER — HOSPITAL ENCOUNTER (OUTPATIENT)
Dept: CARDIOLOGY | Facility: HOSPITAL | Age: 79
Discharge: HOME | End: 2024-11-07
Attending: INTERNAL MEDICINE
Payer: MEDICARE

## 2024-11-07 ENCOUNTER — CLINICAL SUPPORT (OUTPATIENT)
Dept: CARDIOLOGY | Facility: HOSPITAL | Age: 79
End: 2024-11-07
Payer: MEDICARE

## 2024-11-07 DIAGNOSIS — I50.1 LEFT VENTRICULAR FAILURE, UNSPECIFIED: ICD-10-CM

## 2024-11-07 PROCEDURE — 93296 REM INTERROG EVL PM/IDS: CPT | Mod: PO | Performed by: INTERNAL MEDICINE

## 2024-11-07 PROCEDURE — 93295 DEV INTERROG REMOTE 1/2/MLT: CPT | Mod: ,,, | Performed by: INTERNAL MEDICINE

## 2024-11-15 LAB
OHS CV DC REMOTE DEVICE TYPE: NORMAL
OHS CV RV PACING PERCENT: 0.75 %

## 2024-12-06 DIAGNOSIS — I10 PRIMARY HYPERTENSION: ICD-10-CM

## 2024-12-06 DIAGNOSIS — I50.1 LEFT HEART FAILURE: ICD-10-CM

## 2024-12-06 DIAGNOSIS — I25.10 CORONARY ARTERY DISEASE INVOLVING NATIVE CORONARY ARTERY OF NATIVE HEART WITHOUT ANGINA PECTORIS: ICD-10-CM

## 2024-12-09 RX ORDER — AMIODARONE HYDROCHLORIDE 200 MG/1
TABLET ORAL
Qty: 90 TABLET | Refills: 0 | Status: SHIPPED | OUTPATIENT
Start: 2024-12-09 | End: 2024-12-11 | Stop reason: SDUPTHER

## 2024-12-11 ENCOUNTER — OFFICE VISIT (OUTPATIENT)
Dept: CARDIOLOGY | Facility: CLINIC | Age: 79
End: 2024-12-11
Payer: MEDICARE

## 2024-12-11 VITALS
OXYGEN SATURATION: 96 % | DIASTOLIC BLOOD PRESSURE: 60 MMHG | SYSTOLIC BLOOD PRESSURE: 105 MMHG | HEART RATE: 71 BPM | BODY MASS INDEX: 33.59 KG/M2 | WEIGHT: 208.13 LBS

## 2024-12-11 DIAGNOSIS — I25.10 CORONARY ARTERY DISEASE INVOLVING NATIVE CORONARY ARTERY OF NATIVE HEART WITHOUT ANGINA PECTORIS: Chronic | ICD-10-CM

## 2024-12-11 DIAGNOSIS — I10 PRIMARY HYPERTENSION: Chronic | ICD-10-CM

## 2024-12-11 DIAGNOSIS — Z79.899 LONG TERM CURRENT USE OF AMIODARONE: Chronic | ICD-10-CM

## 2024-12-11 DIAGNOSIS — F17.220 TOBACCO DEPENDENCE DUE TO CHEWING TOBACCO: Chronic | ICD-10-CM

## 2024-12-11 DIAGNOSIS — R94.39 ABNORMAL STRESS TEST: ICD-10-CM

## 2024-12-11 DIAGNOSIS — E66.811 OBESITY, CLASS I, BMI 30-34.9: Chronic | ICD-10-CM

## 2024-12-11 DIAGNOSIS — I50.1 LEFT HEART FAILURE: Primary | Chronic | ICD-10-CM

## 2024-12-11 PROCEDURE — 99214 OFFICE O/P EST MOD 30 MIN: CPT | Mod: S$GLB,,, | Performed by: INTERNAL MEDICINE

## 2024-12-11 RX ORDER — SACUBITRIL AND VALSARTAN 24; 26 MG/1; MG/1
1 TABLET, FILM COATED ORAL 2 TIMES DAILY
Qty: 180 TABLET | Refills: 1 | Status: SHIPPED | OUTPATIENT
Start: 2024-12-11

## 2024-12-11 RX ORDER — AMIODARONE HYDROCHLORIDE 200 MG/1
200 TABLET ORAL DAILY
Qty: 90 TABLET | Refills: 1 | Status: SHIPPED | OUTPATIENT
Start: 2024-12-11

## 2024-12-11 NOTE — PROGRESS NOTES
Subjective:    Patient ID:  Moiz Valencia is a 79 y.o. male who presents for Coronary Artery Disease and Hypertension        HPI  STRESS TEST MOSTLY FIXED DEFECT ANTERIOR , PATIENT IS HISTORY OF LV ANEURYSMECTOMY, EF OVER ESTIMATED AT 63%, HISTORY OF ANEURYSMECTOMY, BNP NORMAL THYROID FUNCTION TESTS OK, FATIGUE, ORTHOPEDIC ISSUES, DECREASED MOBILITY, SEE ROS    Past Medical History:   Diagnosis Date    Acute coronary syndrome     CAD (coronary artery disease)     Cardiomyopathy     Carotid artery occlusion     CHF (congestive heart failure)     Diabetes mellitus, type 2     H/O cataract removal with insertion of prosthetic lens     bilat    Heart murmur     Hypercholesteremia     Hypertension     Old MI (myocardial infarction)     Pneumonia     Pneumonia 6/30/2016    [place]     PUD (peptic ulcer disease)     Sleep apnea     Valvular regurgitation      Past Surgical History:   Procedure Laterality Date    CARDIAC PACEMAKER PLACEMENT      CARDIAC SURGERY      pt states that he had 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    CHOLECYSTECTOMY      COLONOSCOPY      CORONARY ANGIOPLASTY      CORONARY ARTERY BYPASS GRAFT      HERNIA REPAIR      HIP SURGERY Right 07/05/2018    HIP SURGERY Left 11/26/2018    INCISIONAL HERNIA REPAIR  06/05/2017    LAPAROSCOPIC CHOLECYSTECTOMY N/A 9/4/2019    Procedure: CHOLECYSTECTOMY-LAPAROSCOPIC;  Surgeon: Aditya Zeng MD;  Location: [facility];  Service: General;  Laterality: N/A;    left ventricular  aneurysm repair       TONSILLECTOMY       Family History   Problem Relation Name Age of Onset    Diabetes Mother      Heart disease Mother      No Known Problems Father       Social History     Socioeconomic History    Marital status:    Tobacco Use    Smoking status: Former    Smokeless tobacco: Former     Types: Chew     Quit date: 7/30/2018   Substance and Sexual Activity    Alcohol use: No    Drug use: No       Review of patient's allergies indicates:  No Known  Allergies    Current Outpatient Medications:     albuterol (PROVENTIL/VENTOLIN HFA) 90 mcg/actuation inhaler, INHALE 1-2 PUFFS INTO THE LUNGS EVERY 6 HOURS AS NEEDED FOR WHEEZING OR SHORTNESS OF BREATH. RESCUE, Disp: 8.5 g, Rfl: 0    allopurinol (ZYLOPRIM) 300 MG tablet, Take 300 mg by mouth once daily., Disp: , Rfl:     aspirin (ECOTRIN) 81 MG EC tablet, Take 1 tablet (81 mg total) by mouth once daily., Disp: , Rfl: 0    atorvastatin (LIPITOR) 80 MG tablet, TAKE 1 TABLET(80 MG) BY MOUTH EVERY EVENING, Disp: 90 tablet, Rfl: 2    B-complex with vitamin C (Z-BEC OR EQUIV) tablet, Take 1 tablet by mouth once daily., Disp: , Rfl:     blood sugar diagnostic Strp, To check BG 2 times daily, to use with insurance preferred meter, Disp: 100 each, Rfl: 3    cetirizine (ZYRTEC) 10 MG tablet, Take 1 tablet (10 mg total) by mouth once daily., Disp: 10 tablet, Rfl: 0    cholecalciferol, vitamin D3, (VITAMIN D3) 25 mcg (1,000 unit) capsule, Take 1,000 Units by mouth once daily., Disp: , Rfl:     cyanocobalamin (VITAMIN B-12) 1000 MCG tablet, Take 1,000 mcg by mouth once daily., Disp: , Rfl:     donepezil (ARICEPT) 10 MG tablet, Take 10 mg by mouth once daily., Disp: , Rfl:     DULoxetine (CYMBALTA) 20 MG capsule, Take 60 mg by mouth once daily. , Disp: , Rfl:     DULoxetine (CYMBALTA) 60 MG capsule, Take 60 mg by mouth once daily., Disp: , Rfl:     FLUZONE HIGH-DOSE 2019-20, PF, 180 mcg/0.5 mL Syrg, ADM 0.5ML IM UTD, Disp: , Rfl: 0    gabapentin (NEURONTIN) 100 MG capsule, Take 1 capsule (100 mg total) by mouth 2 (two) times daily. (Patient taking differently: Take 300 mg by mouth 2 (two) times daily.), Disp: , Rfl:     HYDROcodone-acetaminophen (NORCO) 7.5-325 mg per tablet, TAKE 1 TABLET BY MOUTH TWICE A DAY AS NEEDED TAKE ONLY AS NEEDED FOR SEVERE PAIN  DO NOT DRINK ALCOHOL OR DRIVE WHILE TAKING THIS MED TAKE ONLY AS NEEDED FOR SEVERE PAIN  DO NOT DRINK ALCOHOL OR DRIVE WHILE TAKING THIS MED, Disp: , Rfl:     insulin detemir  U-100 (LEVEMIR) 100 unit/mL injection, Inject 5 Units into the skin every evening. (Patient taking differently: Inject 8 Units into the skin 2 (two) times a day.), Disp: , Rfl:     lancets Misc, To check BG 2 times daily, to use with insurance preferred meter, Disp: 100 each, Rfl: 3    levothyroxine (SYNTHROID) 75 MCG tablet, TAKE 1 TABLET(75 MCG) BY MOUTH BEFORE BREAKFAST, Disp: 90 tablet, Rfl: 0    magnesium oxide (MAG-OX) 400 mg tablet, Take 400 mg by mouth once daily., Disp: , Rfl: 0    memantine (NAMENDA) 10 MG Tab, Take 10 mg by mouth 2 (two) times daily., Disp: , Rfl:     metformin (GLUCOPHAGE) 1000 MG tablet, Take 1,000 mg by mouth 2 (two) times daily with meals., Disp: , Rfl:     mirtazapine (REMERON) 30 MG tablet, Take 15 mg by mouth every evening., Disp: , Rfl:     naproxen (NAPROSYN) 500 MG tablet, Take 500 mg by mouth 2 (two) times daily with meals., Disp: , Rfl:     oxybutynin (DITROPAN-XL) 5 MG TR24, TAKE 1 TABLET(5 MG) BY MOUTH EVERY DAY, Disp: 90 tablet, Rfl: 1    QUEtiapine (SEROQUEL) 100 MG Tab, 50 mg., Disp: , Rfl:     QUEtiapine (SEROQUEL) 200 MG Tab, Take 100-200 mg by mouth nightly as needed., Disp: , Rfl:     UNABLE TO FIND, Take 1 capsule by mouth once daily. medication name: tumeric, Disp: , Rfl:     UNABLE TO FIND, Take 1 capsule by mouth once daily. medication name: vitamin k, Disp: , Rfl:     zinc gluconate 50 mg tablet, Take 50 mg by mouth once daily., Disp: , Rfl:     amiodarone (PACERONE) 200 MG Tab, Take 1 tablet (200 mg total) by mouth once daily., Disp: 90 tablet, Rfl: 1    blood-glucose meter kit, To check BG 2 times daily, to use with insurance preferred meter, Disp: 1 each, Rfl: 0    famotidine (PEPCID) 20 MG tablet, Take 1 tablet (20 mg total) by mouth 2 (two) times daily. for 10 days, Disp: 20 tablet, Rfl: 0    sacubitriL-valsartan (ENTRESTO) 24-26 mg per tablet, Take 1 tablet by mouth 2 (two) times daily., Disp: 180 tablet, Rfl: 1    Review of Systems   Constitutional:  Negative for chills, diaphoresis, fever, night sweats and weight gain.   HENT:  Negative for congestion and nosebleeds.    Eyes:  Negative for blurred vision and visual disturbance.   Cardiovascular:  Negative for chest pain, claudication, cyanosis, dyspnea on exertion (MILD), irregular heartbeat, leg swelling, near-syncope, orthopnea, palpitations, paroxysmal nocturnal dyspnea and syncope.   Respiratory:  Negative for cough, hemoptysis, shortness of breath and wheezing.    Endocrine: Negative for polyphagia and polyuria.   Hematologic/Lymphatic: Negative for adenopathy. Does not bruise/bleed easily.   Skin:  Negative for color change and itching.   Musculoskeletal:  Positive for arthritis and back pain (CHRONIC, LOWER). Negative for falls. Muscle weakness: LEGS.  Gastrointestinal:  Negative for abdominal pain, change in bowel habit, dysphagia, jaundice, melena and nausea.   Genitourinary:  Negative for dysuria and flank pain.   Neurological:  Negative for brief paralysis, focal weakness, headaches, light-headedness, loss of balance (OCC) and weakness.   Psychiatric/Behavioral:  Negative for altered mental status, depression and memory loss (SOME).    Allergic/Immunologic: Negative for hives and persistent infections.        Objective:      Vitals:    12/11/24 1343   BP: 105/60   Pulse: 71   SpO2: 96%   Weight: 94.4 kg (208 lb 1.8 oz)   PainSc: 0-No pain     Body mass index is 33.59 kg/m².    Physical Exam  Constitutional:       Appearance: He is well-developed. He is obese.   HENT:      Head: Normocephalic and atraumatic.   Eyes:      Extraocular Movements: Extraocular movements intact.      Conjunctiva/sclera: Conjunctivae normal.   Neck:      Vascular: Normal carotid pulses. No carotid bruit or JVD.   Cardiovascular:      Rate and Rhythm: Normal rate and regular rhythm. No extrasystoles are present.     Pulses:           Carotid pulses are 2+ on the right side and 2+ on the left side.       Radial pulses are 1+  on the right side and 2+ on the left side.        Posterior tibial pulses are 2+ on the right side and 2+ on the left side.      Heart sounds: Murmur heard.      Systolic murmur is present with a grade of 1/6.      No friction rub. No gallop.   Pulmonary:      Effort: Pulmonary effort is normal.      Breath sounds: Normal breath sounds and air entry. No rales.   Chest:          Comments: STERNOTOMY SCAR, AICD  Abdominal:      Palpations: Abdomen is soft. There is no hepatomegaly.   Musculoskeletal:         General: Normal range of motion.      Cervical back: Neck supple.      Right lower leg: No edema.      Left lower leg: No edema.      Comments: SLOW GAIT   Skin:     General: Skin is warm and dry.      Capillary Refill: Capillary refill takes less than 2 seconds.   Neurological:      General: No focal deficit present.      Mental Status: He is alert and oriented to person, place, and time.      Cranial Nerves: Cranial nerves 2-12 are intact. Cranial nerve deficit: [place].   Psychiatric:         Mood and Affect: Mood normal.         Speech: Speech normal.         Behavior: Behavior normal.                 ..    Chemistry        Component Value Date/Time     06/06/2024 1132    K 4.7 06/06/2024 1132     06/06/2024 1132    CO2 27 06/06/2024 1132    BUN 21 06/06/2024 1132    CREATININE 1.1 06/06/2024 1132     (H) 06/06/2024 1132        Component Value Date/Time    CALCIUM 9.7 06/06/2024 1132    ALKPHOS 90 06/06/2024 1132    AST 13 06/06/2024 1132    AST 15 (L) 11/10/2015 0819    ALT 13 06/06/2024 1132    BILITOT 0.4 06/06/2024 1132    ESTGFRAFRICA >60 11/19/2021 1101    EGFRNONAA >60 11/19/2021 1101            ..  Lab Results   Component Value Date    CHOL 173 06/06/2024    CHOL 139 08/11/2022    CHOL 113 (L) 03/15/2021     Lab Results   Component Value Date    HDL 49 06/06/2024    HDL 54 08/11/2022    HDL 50 03/15/2021     Lab Results   Component Value Date    LDLCALC 90.2 06/06/2024    LDLCALC 73.8  08/11/2022    LDLCALC 52.8 (L) 03/15/2021     Lab Results   Component Value Date    TRIG 169 (H) 06/06/2024    TRIG 56 08/11/2022    TRIG 51 03/15/2021     Lab Results   Component Value Date    CHOLHDL 28.3 06/06/2024    CHOLHDL 38.8 08/11/2022    CHOLHDL 44.2 03/15/2021     ..  Lab Results   Component Value Date    WBC 8.72 11/09/2022    HGB 10.5 (L) 11/09/2022    HCT 33.3 (L) 11/09/2022    MCV 99 (H) 11/09/2022     11/09/2022       Test(s) Reviewed  I have reviewed the following in detail:  [x] Stress test   [] Angiography   [] Echocardiogram   [x] Labs   [] Other:       Assessment:         ICD-10-CM ICD-9-CM   1. Left heart failure  I50.1 428.1   2. Coronary artery disease involving native coronary artery of native heart without angina pectoris  I25.10 414.01   3. Long term current use of amiodarone  Z79.899 V58.69   4. Primary hypertension  I10 401.9   5. Obesity, Class I, BMI 30-34.9  E66.811 278.00   6. Abnormal stress test  R94.39 794.39   7. Tobacco dependence due to chewing tobacco  F17.220 305.1     Problem List Items Addressed This Visit          Cardiac/Vascular    Coronary artery disease involving native coronary artery of native heart without angina pectoris    Relevant Medications    amiodarone (PACERONE) 200 MG Tab    Other Relevant Orders    Comprehensive Metabolic Panel    CBC Auto Differential    Echo    Magnesium    Hypertension    Relevant Medications    amiodarone (PACERONE) 200 MG Tab    Left heart failure - Primary    Relevant Medications    sacubitriL-valsartan (ENTRESTO) 24-26 mg per tablet    amiodarone (PACERONE) 200 MG Tab    Other Relevant Orders    Comprehensive Metabolic Panel    BNP    Echo    Magnesium    Long term current use of amiodarone    Relevant Orders    TSH    T3    T4    Comprehensive Metabolic Panel    Abnormal stress test       Endocrine    Obesity, Class I, BMI 30-34.9       Other    Tobacco dependence due to chewing tobacco        Plan:     TOBACCO CESSATION,  EXTENSIVE COUNSELING DAILY WEIGHT 2 LB PER DAY 5 LB PER WEEK RULE EXPLAINED, MAY CONSIDER ANGIOGRAPHY NO SIGNIFICANT REVERSIBLE ISCHEMIA, PATIENT IS NOT WILLING TO PROCEED NOW INCREASE ACTIVITY RETURN TO CLINIC IN 6 MO LABS AND ECHO, DISCUSSED PLAN WITH THE PATIENT HIS WIFE    ALL CV CLINICALLY STABLE, NO ANGINA, CLASS 2 HF, NO TIA, NO CLINICAL ARRHYTHMIA,CONTINUE CURRENT MEDS, EDUCATION, DIET, EXERCISE         Left heart failure  -     Comprehensive Metabolic Panel; Future; Expected date: 12/11/2024  -     BNP; Future; Expected date: 06/11/2025  -     Echo; Future; Expected date: 06/11/2025  -     Magnesium; Future; Expected date: 12/11/2024  -     sacubitriL-valsartan (ENTRESTO) 24-26 mg per tablet; Take 1 tablet by mouth 2 (two) times daily.  Dispense: 180 tablet; Refill: 1  -     amiodarone (PACERONE) 200 MG Tab; Take 1 tablet (200 mg total) by mouth once daily.  Dispense: 90 tablet; Refill: 1    Coronary artery disease involving native coronary artery of native heart without angina pectoris  -     Comprehensive Metabolic Panel; Future; Expected date: 12/11/2024  -     CBC Auto Differential; Future; Expected date: 12/11/2024  -     Echo; Future; Expected date: 06/11/2025  -     Magnesium; Future; Expected date: 12/11/2024  -     amiodarone (PACERONE) 200 MG Tab; Take 1 tablet (200 mg total) by mouth once daily.  Dispense: 90 tablet; Refill: 1    Long term current use of amiodarone  -     TSH; Future; Expected date: 12/11/2024  -     T3; Future; Expected date: 12/11/2024  -     T4; Future; Expected date: 12/11/2024  -     Comprehensive Metabolic Panel; Future; Expected date: 12/11/2024    Primary hypertension  -     amiodarone (PACERONE) 200 MG Tab; Take 1 tablet (200 mg total) by mouth once daily.  Dispense: 90 tablet; Refill: 1    Obesity, Class I, BMI 30-34.9    Abnormal stress test    Tobacco dependence due to chewing tobacco    RTC Low level/low impact aerobic exercise 5x's/wk. Heart healthy diet and risk  factor modification.    See labs and med orders.    Aerobic exercise 5x's/wk. Heart healthy diet and risk factor modification.    See labs and med orders.

## 2025-01-08 ENCOUNTER — HOSPITAL ENCOUNTER (OUTPATIENT)
Dept: CARDIOLOGY | Facility: HOSPITAL | Age: 80
Discharge: HOME | End: 2025-01-08
Attending: INTERNAL MEDICINE
Payer: MEDICARE

## 2025-01-08 DIAGNOSIS — Z95.810 PRESENCE OF AUTOMATIC (IMPLANTABLE) CARDIAC DEFIBRILLATOR: ICD-10-CM

## 2025-01-08 LAB
OHS CV DC REMOTE DEVICE TYPE: NORMAL
OHS CV RV PACING PERCENT: 0.4 %

## 2025-01-08 PROCEDURE — 93282 PRGRMG EVAL IMPLANTABLE DFB: CPT | Mod: PO

## 2025-01-08 PROCEDURE — 93282 PRGRMG EVAL IMPLANTABLE DFB: CPT | Mod: 26,,, | Performed by: INTERNAL MEDICINE

## 2025-02-06 ENCOUNTER — CLINICAL SUPPORT (OUTPATIENT)
Dept: CARDIOLOGY | Facility: HOSPITAL | Age: 80
End: 2025-02-06
Payer: MEDICARE

## 2025-02-06 ENCOUNTER — HOSPITAL ENCOUNTER (OUTPATIENT)
Dept: CARDIOLOGY | Facility: HOSPITAL | Age: 80
Discharge: HOME | End: 2025-02-06
Attending: INTERNAL MEDICINE
Payer: MEDICARE

## 2025-02-06 DIAGNOSIS — I50.1 LEFT VENTRICULAR FAILURE, UNSPECIFIED: ICD-10-CM

## 2025-02-06 PROCEDURE — 93295 DEV INTERROG REMOTE 1/2/MLT: CPT | Mod: ,,, | Performed by: INTERNAL MEDICINE

## 2025-02-06 PROCEDURE — 93296 REM INTERROG EVL PM/IDS: CPT | Mod: PO | Performed by: INTERNAL MEDICINE

## 2025-02-24 LAB
OHS CV DC REMOTE DEVICE TYPE: NORMAL
OHS CV RV PACING PERCENT: 0.16 %

## 2025-05-25 ENCOUNTER — HOSPITAL ENCOUNTER (OUTPATIENT)
Dept: CARDIOLOGY | Facility: HOSPITAL | Age: 80
Discharge: HOME | End: 2025-05-25
Attending: INTERNAL MEDICINE
Payer: MEDICARE

## 2025-05-25 ENCOUNTER — CLINICAL SUPPORT (OUTPATIENT)
Dept: CARDIOLOGY | Facility: HOSPITAL | Age: 80
End: 2025-05-25
Payer: MEDICARE

## 2025-05-25 DIAGNOSIS — I50.1 LEFT VENTRICULAR FAILURE, UNSPECIFIED: ICD-10-CM

## 2025-05-25 PROCEDURE — 93296 REM INTERROG EVL PM/IDS: CPT | Mod: PO | Performed by: INTERNAL MEDICINE

## 2025-05-25 PROCEDURE — 93295 DEV INTERROG REMOTE 1/2/MLT: CPT | Mod: ,,, | Performed by: INTERNAL MEDICINE

## 2025-05-26 LAB
OHS CV DC REMOTE DEVICE TYPE: NORMAL
OHS CV RV PACING PERCENT: 0.42 %

## 2025-05-29 ENCOUNTER — LAB VISIT (OUTPATIENT)
Dept: PRIMARY CARE CLINIC | Facility: CLINIC | Age: 80
End: 2025-05-29
Payer: MEDICARE

## 2025-05-29 DIAGNOSIS — I50.1 LEFT HEART FAILURE: Chronic | ICD-10-CM

## 2025-05-29 DIAGNOSIS — I25.10 CORONARY ARTERY DISEASE INVOLVING NATIVE CORONARY ARTERY OF NATIVE HEART WITHOUT ANGINA PECTORIS: ICD-10-CM

## 2025-05-29 DIAGNOSIS — Z79.899 LONG TERM CURRENT USE OF AMIODARONE: ICD-10-CM

## 2025-05-29 LAB
ABSOLUTE EOSINOPHIL (OHS): 0.27 K/UL
ABSOLUTE MONOCYTE (OHS): 0.71 K/UL (ref 0.3–1)
ABSOLUTE NEUTROPHIL COUNT (OHS): 4.07 K/UL (ref 1.8–7.7)
ALBUMIN SERPL BCP-MCNC: 3.7 G/DL (ref 3.5–5.2)
ALP SERPL-CCNC: 77 UNIT/L (ref 40–150)
ALT SERPL W/O P-5'-P-CCNC: 12 UNIT/L (ref 10–44)
ANION GAP (OHS): 11 MMOL/L (ref 8–16)
AST SERPL-CCNC: 14 UNIT/L (ref 11–45)
BASOPHILS # BLD AUTO: 0.07 K/UL
BASOPHILS NFR BLD AUTO: 0.9 %
BILIRUB SERPL-MCNC: 0.2 MG/DL (ref 0.1–1)
BNP SERPL-MCNC: 103 PG/ML (ref 0–99)
BUN SERPL-MCNC: 26 MG/DL (ref 8–23)
CALCIUM SERPL-MCNC: 9 MG/DL (ref 8.7–10.5)
CHLORIDE SERPL-SCNC: 106 MMOL/L (ref 95–110)
CO2 SERPL-SCNC: 23 MMOL/L (ref 23–29)
CREAT SERPL-MCNC: 1.6 MG/DL (ref 0.5–1.4)
ERYTHROCYTE [DISTWIDTH] IN BLOOD BY AUTOMATED COUNT: 13.2 % (ref 11.5–14.5)
GFR SERPLBLD CREATININE-BSD FMLA CKD-EPI: 43 ML/MIN/1.73/M2
GLUCOSE SERPL-MCNC: 109 MG/DL (ref 70–110)
HCT VFR BLD AUTO: 38.7 % (ref 40–54)
HGB BLD-MCNC: 12.2 GM/DL (ref 14–18)
IMM GRANULOCYTES # BLD AUTO: 0.02 K/UL (ref 0–0.04)
IMM GRANULOCYTES NFR BLD AUTO: 0.2 % (ref 0–0.5)
LYMPHOCYTES # BLD AUTO: 3.02 K/UL (ref 1–4.8)
MAGNESIUM SERPL-MCNC: 1.9 MG/DL (ref 1.6–2.6)
MCH RBC QN AUTO: 31.2 PG (ref 27–31)
MCHC RBC AUTO-ENTMCNC: 31.5 G/DL (ref 32–36)
MCV RBC AUTO: 99 FL (ref 82–98)
NUCLEATED RBC (/100WBC) (OHS): 0 /100 WBC
PLATELET # BLD AUTO: 245 K/UL (ref 150–450)
PMV BLD AUTO: 10.8 FL (ref 9.2–12.9)
POTASSIUM SERPL-SCNC: 5 MMOL/L (ref 3.5–5.1)
PROT SERPL-MCNC: 7.1 GM/DL (ref 6–8.4)
RBC # BLD AUTO: 3.91 M/UL (ref 4.6–6.2)
RELATIVE EOSINOPHIL (OHS): 3.3 %
RELATIVE LYMPHOCYTE (OHS): 37 % (ref 18–48)
RELATIVE MONOCYTE (OHS): 8.7 % (ref 4–15)
RELATIVE NEUTROPHIL (OHS): 49.9 % (ref 38–73)
SODIUM SERPL-SCNC: 140 MMOL/L (ref 136–145)
T3FREE SERPL-MCNC: <40 NG/DL (ref 60–180)
T4 FREE SERPL-MCNC: 0.94 NG/DL (ref 0.71–1.51)
T4 SERPL-MCNC: 4.8 UG/DL (ref 4.5–11.5)
TSH SERPL-ACNC: 4.07 UIU/ML (ref 0.4–4)
WBC # BLD AUTO: 8.16 K/UL (ref 3.9–12.7)

## 2025-05-29 PROCEDURE — 80053 COMPREHEN METABOLIC PANEL: CPT | Performed by: INTERNAL MEDICINE

## 2025-05-29 PROCEDURE — 84443 ASSAY THYROID STIM HORMONE: CPT | Performed by: INTERNAL MEDICINE

## 2025-05-29 PROCEDURE — 83735 ASSAY OF MAGNESIUM: CPT | Performed by: INTERNAL MEDICINE

## 2025-05-29 PROCEDURE — 84439 ASSAY OF FREE THYROXINE: CPT | Performed by: INTERNAL MEDICINE

## 2025-05-29 PROCEDURE — 84480 ASSAY TRIIODOTHYRONINE (T3): CPT | Performed by: INTERNAL MEDICINE

## 2025-05-29 PROCEDURE — 85025 COMPLETE CBC W/AUTO DIFF WBC: CPT | Performed by: INTERNAL MEDICINE

## 2025-05-29 PROCEDURE — 84436 ASSAY OF TOTAL THYROXINE: CPT | Performed by: INTERNAL MEDICINE

## 2025-05-29 PROCEDURE — 83880 ASSAY OF NATRIURETIC PEPTIDE: CPT | Performed by: INTERNAL MEDICINE

## 2025-06-06 ENCOUNTER — HOSPITAL ENCOUNTER (OUTPATIENT)
Dept: CARDIOLOGY | Facility: HOSPITAL | Age: 80
Discharge: HOME | End: 2025-06-06
Attending: INTERNAL MEDICINE
Payer: MEDICARE

## 2025-06-06 VITALS — HEIGHT: 66 IN | BODY MASS INDEX: 33.43 KG/M2 | WEIGHT: 208 LBS

## 2025-06-06 DIAGNOSIS — I25.10 CORONARY ARTERY DISEASE INVOLVING NATIVE CORONARY ARTERY OF NATIVE HEART WITHOUT ANGINA PECTORIS: Chronic | ICD-10-CM

## 2025-06-06 DIAGNOSIS — I50.1 LEFT HEART FAILURE: Chronic | ICD-10-CM

## 2025-06-06 PROCEDURE — 93306 TTE W/DOPPLER COMPLETE: CPT | Mod: 26,,, | Performed by: INTERNAL MEDICINE

## 2025-06-06 PROCEDURE — 93306 TTE W/DOPPLER COMPLETE: CPT | Mod: PO

## 2025-06-09 LAB
AORTIC SIZE INDEX (SOV): 2 CM/M2
AORTIC SIZE INDEX: 1.6 CM/M2
ASCENDING AORTA: 3.2 CM
AV INDEX (PROSTH): 0.7
AV MEAN GRADIENT: 4 MMHG
AV PEAK GRADIENT: 6 MMHG
AV VALVE AREA BY VELOCITY RATIO: 3.2 CM²
AV VALVE AREA: 2.7 CM²
AV VELOCITY RATIO: 0.83
BSA FOR ECHO PROCEDURE: 2.1 M2
CV ECHO LV RWT: 0.48 CM
DOP CALC AO PEAK VEL: 1.2 M/S
DOP CALC AO VTI: 28.4 CM
DOP CALC LVOT AREA: 3.8 CM2
DOP CALC LVOT DIAMETER: 2.2 CM
DOP CALC LVOT PEAK VEL: 1 M/S
DOP CALC LVOT STROKE VOLUME: 76 CM3
DOP CALCLVOT PEAK VEL VTI: 20 CM
E WAVE DECELERATION TIME: 235 MSEC
E/A RATIO: 0.74
E/E' RATIO: 9 M/S
ECHO LV POSTERIOR WALL: 1.2 CM (ref 0.6–1.1)
FRACTIONAL SHORTENING: 26 % (ref 28–44)
INTERVENTRICULAR SEPTUM: 1.2 CM (ref 0.6–1.1)
IVRT: 157 MSEC
LEFT ATRIUM AREA SYSTOLIC (APICAL 2 CHAMBER): 24 CM2
LEFT ATRIUM AREA SYSTOLIC (APICAL 4 CHAMBER): 23.51 CM2
LEFT ATRIUM SIZE: 4.2 CM
LEFT ATRIUM VOLUME INDEX MOD: 38 ML/M2
LEFT ATRIUM VOLUME MOD: 78 ML
LEFT INTERNAL DIMENSION IN SYSTOLE: 3.7 CM (ref 2.1–4)
LEFT VENTRICLE DIASTOLIC VOLUME INDEX: 58.62 ML/M2
LEFT VENTRICLE DIASTOLIC VOLUME: 119 ML
LEFT VENTRICLE END SYSTOLIC VOLUME APICAL 2 CHAMBER: 81.33 ML
LEFT VENTRICLE END SYSTOLIC VOLUME APICAL 4 CHAMBER: 75.46 ML
LEFT VENTRICLE MASS INDEX: 115.1 G/M2
LEFT VENTRICLE SYSTOLIC VOLUME INDEX: 28.6 ML/M2
LEFT VENTRICLE SYSTOLIC VOLUME: 58 ML
LEFT VENTRICULAR INTERNAL DIMENSION IN DIASTOLE: 5 CM (ref 3.5–6)
LEFT VENTRICULAR MASS: 233.7 G
LV LATERAL E/E' RATIO: 7.1 M/S
LV SEPTAL E/E' RATIO: 11.4 M/S
LVED V (TEICH): 119.14 ML
LVES V (TEICH): 58.02 ML
LVOT MG: 1.86 MMHG
LVOT MV: 0.62 CM/S
MV PEAK A VEL: 0.77 M/S
MV PEAK E VEL: 0.57 M/S
MV STENOSIS PRESSURE HALF TIME: 68.28 MS
MV VALVE AREA P 1/2 METHOD: 3.22 CM2
OHS CV RV/LV RATIO: 0.76 CM
PISA MRMAX VEL: 2.26 M/S
PISA TR MAX VEL: 2 M/S
PULM VEIN S/D RATIO: 1.33
PV PEAK D VEL: 0.39 M/S
PV PEAK S VEL: 0.52 M/S
RA PRESSURE ESTIMATED: 8 MMHG
RIGHT VENTRICLE DIASTOLIC BASEL DIMENSION: 3.8 CM
RIGHT VENTRICLE DIASTOLIC LENGTH: 5.7 CM
RIGHT VENTRICLE DIASTOLIC MID DIMENSION: 3.3 CM
RIGHT VENTRICULAR END-DIASTOLIC DIMENSION: 3.82 CM
RIGHT VENTRICULAR LENGTH IN DIASTOLE (APICAL 4-CHAMBER VIEW): 5.68 CM
RV MID DIAMA: 3.32 CM
RV TB RVSP: 10 MMHG
RV TISSUE DOPPLER FREE WALL SYSTOLIC VELOCITY 1 (APICAL 4 CHAMBER VIEW): 7.89 CM/S
SINUS: 4.09 CM
STJ: 3.2 CM
TDI LATERAL: 0.08 M/S
TDI SEPTAL: 0.05 M/S
TDI: 0.07 M/S
TR MAX PG: 16 MMHG
TRICUSPID ANNULAR PLANE SYSTOLIC EXCURSION: 1.3 CM
TV REST PULMONARY ARTERY PRESSURE: 24 MMHG
Z-SCORE OF LEFT VENTRICULAR DIMENSION IN END DIASTOLE: -1.85
Z-SCORE OF LEFT VENTRICULAR DIMENSION IN END SYSTOLE: 0.03

## 2025-06-11 ENCOUNTER — OFFICE VISIT (OUTPATIENT)
Dept: CARDIOLOGY | Facility: CLINIC | Age: 80
End: 2025-06-11
Payer: MEDICARE

## 2025-06-11 VITALS
DIASTOLIC BLOOD PRESSURE: 50 MMHG | HEIGHT: 66 IN | BODY MASS INDEX: 32.99 KG/M2 | SYSTOLIC BLOOD PRESSURE: 90 MMHG | WEIGHT: 205.25 LBS | HEART RATE: 60 BPM

## 2025-06-11 DIAGNOSIS — E66.811 OBESITY, CLASS I, BMI 30-34.9: Chronic | ICD-10-CM

## 2025-06-11 DIAGNOSIS — Z95.810 ICD (IMPLANTABLE CARDIOVERTER-DEFIBRILLATOR) IN PLACE: Chronic | ICD-10-CM

## 2025-06-11 DIAGNOSIS — Z79.899 LONG TERM CURRENT USE OF AMIODARONE: Chronic | ICD-10-CM

## 2025-06-11 DIAGNOSIS — F17.220 TOBACCO DEPENDENCE DUE TO CHEWING TOBACCO: ICD-10-CM

## 2025-06-11 DIAGNOSIS — R09.89 ARTERIAL BRUIT: ICD-10-CM

## 2025-06-11 DIAGNOSIS — I50.1 LEFT HEART FAILURE: Primary | Chronic | ICD-10-CM

## 2025-06-11 DIAGNOSIS — I34.0 NON-RHEUMATIC MITRAL REGURGITATION: Chronic | ICD-10-CM

## 2025-06-11 DIAGNOSIS — N18.31 STAGE 3A CHRONIC KIDNEY DISEASE: Chronic | ICD-10-CM

## 2025-06-11 DIAGNOSIS — I25.10 CORONARY ARTERY DISEASE INVOLVING NATIVE CORONARY ARTERY OF NATIVE HEART WITHOUT ANGINA PECTORIS: Chronic | ICD-10-CM

## 2025-06-11 PROCEDURE — 99214 OFFICE O/P EST MOD 30 MIN: CPT | Mod: S$GLB,,, | Performed by: INTERNAL MEDICINE

## 2025-06-11 RX ORDER — ATORVASTATIN CALCIUM 80 MG/1
80 TABLET, FILM COATED ORAL NIGHTLY
Qty: 90 TABLET | Refills: 2 | Status: SHIPPED | OUTPATIENT
Start: 2025-06-11

## 2025-06-11 NOTE — PROGRESS NOTES
Subjective:    Patient ID:  Moiz Valencia is a 79 y.o. male who presents for Follow-up and Left ventricular failure, unspecified        HPI  DISCUSSED LABS, TESTS ECHO EF 25-30% WAS MORE ON THE BY FRACTIONAL SHORTENING, MODERATELY DILATED LEFT ATRIUM MILD MR PA PRESSURE 24 MM OF MERCURY MILDLY DILATED ASCENDING AORTA , CMP WITH A CREATININE OF 1.6 GFR OF 43, , MAGNESIUM 1.9 UP, THYROID FUNCTION TEST NORMAL, HEMOGLOBIN 12.2 UP, AICD CHECK OK, BP ? , NOT USING CPAP, NO SX, DOEN NOT DRINK MUCH FLUIDS,  SEE ROS    Left Ventricle: The left ventricle is normal in size. Mildly increased wall thickness.  There was concentric hypertrophy.  Regional wall motion abnormalities and Global hypokinesis present. See diagram for wall motion findings. Septal motion is abnormal. There is severely reduced systolic function with a visually estimated ejection fraction of 25 - 30%, it was calculated at more than that by fractional shortening.  There is diastolic dysfunction.    Right Ventricle: The right ventricle is normal in size Systolic function is normal.    Left Atrium: The left atrium is moderately dilated    Aortic Valve: There is mild aortic valve sclerosis.  No stenosis    Mitral Valve: There is trace to mild MR regurgitation.    Pulmonary Artery: The estimated pulmonary artery systolic pressure is 24 mmHg.    IVC/SVC: Intermediate venous pressure at 8 mmHg.    Aorta: The ascending aorta is mildly dilated measuring 3.2 cm.  Past Medical History:   Diagnosis Date    Acute coronary syndrome     CAD (coronary artery disease)     Cardiomyopathy     Carotid artery occlusion     CHF (congestive heart failure)     Diabetes mellitus, type 2     H/O cataract removal with insertion of prosthetic lens     bilat    Heart murmur     Hypercholesteremia     Hypertension     Old MI (myocardial infarction)     Pneumonia     Pneumonia 6/30/2016    [place]     PUD (peptic ulcer disease)     Sleep apnea     Valvular regurgitation      Past  Surgical History:   Procedure Laterality Date    CARDIAC PACEMAKER PLACEMENT      CARDIAC SURGERY      pt states that he had 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    CHOLECYSTECTOMY      COLONOSCOPY      CORONARY ANGIOPLASTY      CORONARY ARTERY BYPASS GRAFT      HERNIA REPAIR      HIP SURGERY Right 07/05/2018    HIP SURGERY Left 11/26/2018    INCISIONAL HERNIA REPAIR  06/05/2017    LAPAROSCOPIC CHOLECYSTECTOMY N/A 9/4/2019    Procedure: CHOLECYSTECTOMY-LAPAROSCOPIC;  Surgeon: Aditya Zeng MD;  Location: [facility];  Service: General;  Laterality: N/A;    left ventricular  aneurysm repair       TONSILLECTOMY       Family History   Problem Relation Name Age of Onset    Diabetes Mother      Heart disease Mother      No Known Problems Father       Social History     Socioeconomic History    Marital status:    Tobacco Use    Smoking status: Former    Smokeless tobacco: Former     Types: Chew     Quit date: 7/30/2018   Substance and Sexual Activity    Alcohol use: No    Drug use: No       Review of patient's allergies indicates:  No Known Allergies  Current Medications[1]    Review of Systems   Constitutional: Negative for chills, diaphoresis, fever, night sweats and weight gain.   HENT:  Negative for congestion and nosebleeds.    Eyes:  Negative for blurred vision and visual disturbance.   Cardiovascular:  Negative for chest pain, claudication, cyanosis, dyspnea on exertion (MINIMAL), irregular heartbeat, leg swelling, near-syncope, orthopnea, palpitations, paroxysmal nocturnal dyspnea and syncope.   Respiratory:  Negative for cough, hemoptysis, shortness of breath and wheezing.    Endocrine: Negative for polyphagia and polyuria.   Hematologic/Lymphatic: Negative for adenopathy. Does not bruise/bleed easily.   Skin:  Negative for color change and itching.   Musculoskeletal:  Positive for back pain (CHRONIC, LOWER) and joint pain. Negative for falls. Muscle weakness: LEGS.  Gastrointestinal:   "Negative for abdominal pain, change in bowel habit, dysphagia, jaundice, melena and nausea.   Genitourinary:  Negative for dysuria and flank pain.   Neurological:  Negative for brief paralysis, focal weakness, headaches, light-headedness, loss of balance (OCC) and weakness.   Psychiatric/Behavioral:  Negative for altered mental status, depression and memory loss (SOME).    Allergic/Immunologic: Negative for hives and persistent infections.        Objective:      Vitals:    06/11/25 1436 06/11/25 1437 06/11/25 1445   BP: (!) 73/44 (!) 85/49 (!) 90/50   Pulse: 63 60    Weight: 93.1 kg (205 lb 4 oz)     Height: 5' 6" (1.676 m)     PainSc: 0-No pain       Body mass index is 33.13 kg/m².    Physical Exam  Constitutional:       Appearance: He is well-developed. He is obese.   HENT:      Head: Normocephalic and atraumatic.   Eyes:      Extraocular Movements: Extraocular movements intact.      Conjunctiva/sclera: Conjunctivae normal.   Neck:      Vascular: Normal carotid pulses. No carotid bruit or JVD.   Cardiovascular:      Rate and Rhythm: Normal rate and regular rhythm. No extrasystoles are present.     Pulses:           Carotid pulses are 2+ on the right side and 2+ on the left side with bruit.       Radial pulses are 1+ on the right side and 2+ on the left side.        Posterior tibial pulses are 2+ on the right side and 2+ on the left side.      Heart sounds: Murmur heard.      Systolic murmur is present with a grade of 1/6 at the lower left sternal border.      No friction rub. No gallop.   Pulmonary:      Effort: No respiratory distress.      Breath sounds: Normal breath sounds. No rales.   Chest:          Comments: STERNOTOMY SCAR, AICD  Abdominal:      Palpations: Abdomen is soft. There is no hepatomegaly.   Musculoskeletal:         General: Normal range of motion.      Cervical back: Neck supple.      Right lower leg: No edema.      Left lower leg: No edema.      Comments: SLOW GAIT   Skin:     General: Skin is " warm and dry.      Capillary Refill: Capillary refill takes less than 2 seconds.   Neurological:      General: No focal deficit present.      Mental Status: He is alert and oriented to person, place, and time.      Cranial Nerves: Cranial nerves 2-12 are intact. Cranial nerve deficit: [place].   Psychiatric:         Mood and Affect: Mood normal.         Speech: Speech normal.         Behavior: Behavior normal.                 ..    Chemistry        Component Value Date/Time     05/29/2025 1405     06/06/2024 1132    K 5.0 05/29/2025 1405    K 4.7 06/06/2024 1132     05/29/2025 1405     06/06/2024 1132    CO2 23 05/29/2025 1405    CO2 27 06/06/2024 1132    BUN 26 (H) 05/29/2025 1405    CREATININE 1.6 (H) 05/29/2025 1405     05/29/2025 1405     (H) 06/06/2024 1132        Component Value Date/Time    CALCIUM 9.0 05/29/2025 1405    CALCIUM 9.7 06/06/2024 1132    ALKPHOS 77 05/29/2025 1405    ALKPHOS 90 06/06/2024 1132    AST 14 05/29/2025 1405    AST 13 06/06/2024 1132    AST 15 (L) 11/10/2015 0819    ALT 12 05/29/2025 1405    ALT 13 06/06/2024 1132    BILITOT 0.2 05/29/2025 1405    BILITOT 0.4 06/06/2024 1132    ESTGFRAFRICA >60 11/19/2021 1101    EGFRNONAA >60 11/19/2021 1101            ..  Lab Results   Component Value Date    CHOL 173 06/06/2024    CHOL 139 08/11/2022    CHOL 113 (L) 03/15/2021     Lab Results   Component Value Date    HDL 49 06/06/2024    HDL 54 08/11/2022    HDL 50 03/15/2021     Lab Results   Component Value Date    LDLCALC 90.2 06/06/2024    LDLCALC 73.8 08/11/2022    LDLCALC 52.8 (L) 03/15/2021     Lab Results   Component Value Date    TRIG 169 (H) 06/06/2024    TRIG 56 08/11/2022    TRIG 51 03/15/2021     Lab Results   Component Value Date    CHOLHDL 28.3 06/06/2024    CHOLHDL 38.8 08/11/2022    CHOLHDL 44.2 03/15/2021     ..  Lab Results   Component Value Date    WBC 8.16 05/29/2025    HGB 12.2 (L) 05/29/2025    HCT 38.7 (L) 05/29/2025    MCV 99 (H)  05/29/2025     05/29/2025       Test(s) Reviewed  I have reviewed the following in detail:  [] Stress test   [] Angiography   [x] Echocardiogram   [x] Labs   [x] Other:       Assessment:         ICD-10-CM ICD-9-CM   1. Left heart failure  I50.1 428.1   2. Long term current use of amiodarone  Z79.899 V58.69   3. Non-rheumatic mitral regurgitation  I34.0 424.0   4. Coronary artery disease involving native coronary artery of native heart without angina pectoris  I25.10 414.01   5. ICD (implantable cardioverter-defibrillator) in place  Z95.810 V45.02   6. Tobacco dependence due to chewing tobacco  F17.220 305.1   7. Stage 3a chronic kidney disease  N18.31 585.3   8. Obesity, Class I, BMI 30-34.9  E66.811 278.00   9. Arterial bruit  R09.89 785.9     Problem List Items Addressed This Visit          Cardiac/Vascular    Coronary artery disease involving native coronary artery of native heart without angina pectoris    Relevant Orders    Magnesium    Left heart failure - Primary    Relevant Orders    Comprehensive Metabolic Panel    BNP    Magnesium    Long term current use of amiodarone    Relevant Orders    Comprehensive Metabolic Panel    TSH    ICD (implantable cardioverter-defibrillator) in place    Non-rheumatic mitral regurgitation    Arterial bruit    Overview   US         Relevant Orders    CV Ultrasound Bilateral Doppler Carotid       Renal/    Stage 3a chronic kidney disease       Endocrine    Obesity, Class I, BMI 30-34.9       Other    Tobacco dependence due to chewing tobacco        Plan:       HYDRATION, EXPLAINED EFFECT ON THE KIDNEY PATIENT DOES NOT DRINK MUCH FLUIDS, DAILY WEIGHT 2 LB PER DAY 5 LB PER WEEK RULE EXPLAINED, CHECK CAROTIDS, TOBACCO CESSATION, , RETURN TO CLINIC IN 6 MO WITH LABS, DISCUSSED PLAN WITH THE PATIENT AND HIS WIFE  ALL CV CLINICALLY STABLE, NO ANGINA, CLASS 2 HF, NO TIA, NO CLINICAL ARRHYTHMIA,CONTINUE CURRENT MEDS, EDUCATION, DIET, EXERCISE AS MUCH AS POSSIBLE        Left  heart failure  -     Comprehensive Metabolic Panel; Future; Expected date: 12/11/2025  -     BNP; Future; Expected date: 12/11/2025  -     Magnesium; Future; Expected date: 06/11/2025    Long term current use of amiodarone  -     Comprehensive Metabolic Panel; Future; Expected date: 12/11/2025  -     TSH; Future; Expected date: 06/11/2025    Non-rheumatic mitral regurgitation    Coronary artery disease involving native coronary artery of native heart without angina pectoris  -     Magnesium; Future; Expected date: 06/11/2025    ICD (implantable cardioverter-defibrillator) in place    Tobacco dependence due to chewing tobacco    Stage 3a chronic kidney disease    Obesity, Class I, BMI 30-34.9    Arterial bruit  Comments:  US  Orders:  -     CV Ultrasound Bilateral Doppler Carotid; Future    Other orders  -     atorvastatin (LIPITOR) 80 MG tablet; Take 1 tablet (80 mg total) by mouth every evening.  Dispense: 90 tablet; Refill: 2    RTC Low level/low impact aerobic exercise 5x's/wk. Heart healthy diet and risk factor modification.    See labs and med orders.    Aerobic exercise 5x's/wk. Heart healthy diet and risk factor modification.    See labs and med orders.             [1]   Current Outpatient Medications:     albuterol (PROVENTIL/VENTOLIN HFA) 90 mcg/actuation inhaler, INHALE 1-2 PUFFS INTO THE LUNGS EVERY 6 HOURS AS NEEDED FOR WHEEZING OR SHORTNESS OF BREATH. RESCUE, Disp: 8.5 g, Rfl: 0    amiodarone (PACERONE) 200 MG Tab, Take 1 tablet (200 mg total) by mouth once daily., Disp: 90 tablet, Rfl: 1    B-complex with vitamin C (Z-BEC OR EQUIV) tablet, Take 1 tablet by mouth once daily., Disp: , Rfl:     blood sugar diagnostic Strp, To check BG 2 times daily, to use with insurance preferred meter, Disp: 100 each, Rfl: 3    blood-glucose meter kit, To check BG 2 times daily, to use with insurance preferred meter, Disp: 1 each, Rfl: 0    cholecalciferol, vitamin D3, (VITAMIN D3) 25 mcg (1,000 unit) capsule, Take  1,000 Units by mouth once daily., Disp: , Rfl:     cyanocobalamin (VITAMIN B-12) 1000 MCG tablet, Take 1,000 mcg by mouth once daily., Disp: , Rfl:     donepezil (ARICEPT) 10 MG tablet, Take 10 mg by mouth once daily., Disp: , Rfl:     DULoxetine (CYMBALTA) 60 MG capsule, Take 60 mg by mouth once daily., Disp: , Rfl:     FLUZONE HIGH-DOSE 2019-20, PF, 180 mcg/0.5 mL Syrg, ADM 0.5ML IM UTD, Disp: , Rfl: 0    gabapentin (NEURONTIN) 100 MG capsule, Take 1 capsule (100 mg total) by mouth 2 (two) times daily., Disp: , Rfl:     HYDROcodone-acetaminophen (NORCO) 7.5-325 mg per tablet, TAKE 1 TABLET BY MOUTH TWICE A DAY AS NEEDED TAKE ONLY AS NEEDED FOR SEVERE PAIN  DO NOT DRINK ALCOHOL OR DRIVE WHILE TAKING THIS MED TAKE ONLY AS NEEDED FOR SEVERE PAIN  DO NOT DRINK ALCOHOL OR DRIVE WHILE TAKING THIS MED, Disp: , Rfl:     insulin detemir U-100 (LEVEMIR) 100 unit/mL injection, Inject 5 Units into the skin every evening., Disp: , Rfl:     lancets Misc, To check BG 2 times daily, to use with insurance preferred meter, Disp: 100 each, Rfl: 3    levothyroxine (SYNTHROID) 75 MCG tablet, TAKE 1 TABLET(75 MCG) BY MOUTH BEFORE BREAKFAST, Disp: 90 tablet, Rfl: 0    magnesium oxide (MAG-OX) 400 mg tablet, Take 400 mg by mouth once daily., Disp: , Rfl: 0    memantine (NAMENDA) 10 MG Tab, Take 10 mg by mouth 2 (two) times daily., Disp: , Rfl:     metformin (GLUCOPHAGE) 1000 MG tablet, Take 1,000 mg by mouth 2 (two) times daily with meals., Disp: , Rfl:     mirtazapine (REMERON) 30 MG tablet, Take 15 mg by mouth every evening., Disp: , Rfl:     oxybutynin (DITROPAN-XL) 5 MG TR24, TAKE 1 TABLET(5 MG) BY MOUTH EVERY DAY, Disp: 90 tablet, Rfl: 1    QUEtiapine (SEROQUEL) 200 MG Tab, Take 100-200 mg by mouth nightly as needed., Disp: , Rfl:     sacubitriL-valsartan (ENTRESTO) 24-26 mg per tablet, Take 1 tablet by mouth 2 (two) times daily., Disp: 180 tablet, Rfl: 1    UNABLE TO FIND, Take 1 capsule by mouth once daily. medication name:  vitamin k, Disp: , Rfl:     allopurinol (ZYLOPRIM) 300 MG tablet, Take 300 mg by mouth once daily. (Patient not taking: Reported on 6/11/2025), Disp: , Rfl:     aspirin (ECOTRIN) 81 MG EC tablet, Take 1 tablet (81 mg total) by mouth once daily. (Patient not taking: Reported on 6/11/2025), Disp: , Rfl: 0    atorvastatin (LIPITOR) 80 MG tablet, Take 1 tablet (80 mg total) by mouth every evening., Disp: 90 tablet, Rfl: 2    cetirizine (ZYRTEC) 10 MG tablet, Take 1 tablet (10 mg total) by mouth once daily. (Patient not taking: Reported on 6/11/2025), Disp: 10 tablet, Rfl: 0    DULoxetine (CYMBALTA) 20 MG capsule, Take 60 mg by mouth once daily.  (Patient not taking: Reported on 6/11/2025), Disp: , Rfl:     famotidine (PEPCID) 20 MG tablet, Take 1 tablet (20 mg total) by mouth 2 (two) times daily. for 10 days (Patient not taking: Reported on 6/11/2025), Disp: 20 tablet, Rfl: 0    naproxen (NAPROSYN) 500 MG tablet, Take 500 mg by mouth 2 (two) times daily with meals. (Patient not taking: Reported on 6/11/2025), Disp: , Rfl:     QUEtiapine (SEROQUEL) 100 MG Tab, 50 mg. (Patient not taking: Reported on 6/11/2025), Disp: , Rfl:     UNABLE TO FIND, Take 1 capsule by mouth once daily. medication name: tumeric (Patient not taking: Reported on 6/11/2025), Disp: , Rfl:     zinc gluconate 50 mg tablet, Take 50 mg by mouth once daily. (Patient not taking: Reported on 6/11/2025), Disp: , Rfl:

## 2025-06-15 PROBLEM — R09.89 ARTERIAL BRUIT: Status: ACTIVE | Noted: 2025-06-15

## 2025-06-19 ENCOUNTER — CLINICAL SUPPORT (OUTPATIENT)
Dept: CARDIOLOGY | Facility: CLINIC | Age: 80
End: 2025-06-19
Attending: INTERNAL MEDICINE
Payer: MEDICARE

## 2025-06-19 DIAGNOSIS — R09.89 ARTERIAL BRUIT: ICD-10-CM

## 2025-06-19 LAB
LEFT CBA DIAS: 9 CM/S
LEFT CBA SYS: 61 CM/S
LEFT CCA DIST DIAS: 10 CM/S
LEFT CCA DIST SYS: 79 CM/S
LEFT CCA MID DIAS: 11 CM/S
LEFT CCA MID SYS: 109 CM/S
LEFT CCA PROX DIAS: 16 CM/S
LEFT CCA PROX SYS: 154 CM/S
LEFT ECA DIAS: 2 CM/S
LEFT ECA SYS: 73 CM/S
LEFT ICA DIST DIAS: 20 CM/S
LEFT ICA DIST SYS: 61 CM/S
LEFT ICA MID DIAS: 16 CM/S
LEFT ICA MID SYS: 52 CM/S
LEFT ICA PROX DIAS: 14 CM/S
LEFT ICA PROX SYS: 94 CM/S
LEFT VERTEBRAL DIAS: 9 CM/S
LEFT VERTEBRAL SYS: 82 CM/S
OHS CV CAROTID RIGHT ICA EDV HIGHEST: 18
OHS CV CAROTID ULTRASOUND LEFT ICA/CCA RATIO: 1.19
OHS CV CAROTID ULTRASOUND RIGHT ICA/CCA RATIO: 1.13
OHS CV PV CAROTID LEFT HIGHEST CCA: 154
OHS CV PV CAROTID LEFT HIGHEST ICA: 94
OHS CV PV CAROTID RIGHT HIGHEST CCA: 75
OHS CV PV CAROTID RIGHT HIGHEST ICA: 85
OHS CV US CAROTID LEFT HIGHEST EDV: 20
RIGHT CBA DIAS: 13 CM/S
RIGHT CBA SYS: 59 CM/S
RIGHT CCA DIST DIAS: 13 CM/S
RIGHT CCA DIST SYS: 75 CM/S
RIGHT CCA MID DIAS: 14 CM/S
RIGHT CCA MID SYS: 73 CM/S
RIGHT CCA PROX DIAS: 12 CM/S
RIGHT CCA PROX SYS: 71 CM/S
RIGHT ECA DIAS: 2 CM/S
RIGHT ECA SYS: 65 CM/S
RIGHT ICA DIST DIAS: 18 CM/S
RIGHT ICA DIST SYS: 75 CM/S
RIGHT ICA MID DIAS: 16 CM/S
RIGHT ICA MID SYS: 74 CM/S
RIGHT ICA PROX DIAS: 15 CM/S
RIGHT ICA PROX SYS: 85 CM/S
RIGHT VERTEBRAL DIAS: 11 CM/S
RIGHT VERTEBRAL SYS: 49 CM/S

## 2025-06-19 PROCEDURE — 93880 EXTRACRANIAL BILAT STUDY: CPT | Mod: S$GLB,,, | Performed by: INTERNAL MEDICINE

## 2025-08-04 DIAGNOSIS — I50.1 LEFT HEART FAILURE: Chronic | ICD-10-CM

## 2025-08-04 RX ORDER — SACUBITRIL AND VALSARTAN 24; 26 MG/1; MG/1
1 TABLET, FILM COATED ORAL 2 TIMES DAILY
Qty: 180 TABLET | Refills: 1 | Status: SHIPPED | OUTPATIENT
Start: 2025-08-04